# Patient Record
Sex: FEMALE | Race: ASIAN | NOT HISPANIC OR LATINO | URBAN - METROPOLITAN AREA
[De-identification: names, ages, dates, MRNs, and addresses within clinical notes are randomized per-mention and may not be internally consistent; named-entity substitution may affect disease eponyms.]

---

## 2018-06-25 ENCOUNTER — EMERGENCY (EMERGENCY)
Facility: HOSPITAL | Age: 43
LOS: 1 days | Discharge: HOME | End: 2018-06-25
Admitting: EMERGENCY MEDICINE
Payer: MEDICAID

## 2018-06-25 VITALS
DIASTOLIC BLOOD PRESSURE: 73 MMHG | SYSTOLIC BLOOD PRESSURE: 121 MMHG | TEMPERATURE: 98 F | OXYGEN SATURATION: 100 % | HEART RATE: 68 BPM | RESPIRATION RATE: 16 BRPM

## 2018-06-25 PROCEDURE — 99282 EMERGENCY DEPT VISIT SF MDM: CPT

## 2018-06-25 NOTE — ED PROVIDER NOTE - MEDICAL DECISION MAKING DETAILS
44 y/o F here for dental pain. No evidence of abscess. Pt needs teeth extracted as per dental, but does not have appt. Given number for dental clinic and pt was able to arrange appt w/ dentist on 07/02/18.

## 2018-06-25 NOTE — ED PROVIDER NOTE - OBJECTIVE STATEMENT
44 y/o F w/ no significant PMhx, presents to the ED c/o toothache xseveral weeks. Pt was told she needs teeth extracted by dentist, but was unable to get appt so came to ER. Denies fever, chills, difficulty swallowing, numbness/tingling or any other complaints.

## 2019-08-18 ENCOUNTER — EMERGENCY (EMERGENCY)
Facility: HOSPITAL | Age: 44
LOS: 1 days | Discharge: HOME | End: 2019-08-18
Admitting: EMERGENCY MEDICINE
Payer: MEDICAID

## 2019-08-18 VITALS
TEMPERATURE: 98 F | DIASTOLIC BLOOD PRESSURE: 75 MMHG | OXYGEN SATURATION: 99 % | RESPIRATION RATE: 16 BRPM | HEART RATE: 93 BPM | SYSTOLIC BLOOD PRESSURE: 114 MMHG

## 2019-08-18 PROCEDURE — 99283 EMERGENCY DEPT VISIT LOW MDM: CPT

## 2019-08-18 PROCEDURE — 73610 X-RAY EXAM OF ANKLE: CPT | Mod: 26,LT

## 2019-08-18 PROCEDURE — 73630 X-RAY EXAM OF FOOT: CPT | Mod: 26,LT

## 2019-08-18 RX ORDER — ACETAMINOPHEN 500 MG
650 TABLET ORAL ONCE
Refills: 0 | Status: COMPLETED | OUTPATIENT
Start: 2019-08-18 | End: 2019-08-18

## 2019-08-18 RX ADMIN — Medication 650 MILLIGRAM(S): at 21:03

## 2019-08-18 NOTE — ED PROVIDER NOTE - PHYSICAL EXAMINATION
Gen: Well appearing in NAD  Head: NC/AT  Neck: trachea midline  Resp:  No distress  Ext: no deformities  Neuro:  A&O appears non focal  Skin:  Warm and dry as visualized  Psych:  Normal affect and mood    left foot/ankle: no swelling or obv def. +ttp dorsum of foot/soft stissue. no tenderness to lateral malelous. full rom. sensations intact. no bruising noted.

## 2019-08-18 NOTE — ED PROVIDER NOTE - OBJECTIVE STATEMENT
45 Y/O female no PMH presents to ED c/o left ankle/foot inury s/p fall. Pt. states earlier today at 1pm she tripped and fell twisting her left ankle/foot - c/o increased pain to area worse with weight bearing. Denies taking anything for symptoms. Denies head trauma loc or any other injuries. Denies nausea vomit weakness dizziness.

## 2020-03-04 ENCOUNTER — EMERGENCY (EMERGENCY)
Facility: HOSPITAL | Age: 45
LOS: 1 days | Discharge: HOME | End: 2020-03-04
Attending: EMERGENCY MEDICINE | Admitting: EMERGENCY MEDICINE
Payer: MEDICAID

## 2020-03-04 VITALS
OXYGEN SATURATION: 100 % | SYSTOLIC BLOOD PRESSURE: 118 MMHG | TEMPERATURE: 98 F | HEART RATE: 61 BPM | DIASTOLIC BLOOD PRESSURE: 69 MMHG | RESPIRATION RATE: 16 BRPM

## 2020-03-04 VITALS
RESPIRATION RATE: 16 BRPM | OXYGEN SATURATION: 99 % | TEMPERATURE: 98 F | SYSTOLIC BLOOD PRESSURE: 120 MMHG | DIASTOLIC BLOOD PRESSURE: 73 MMHG | HEART RATE: 84 BPM

## 2020-03-04 LAB
ALBUMIN SERPL ELPH-MCNC: 4 G/DL — SIGNIFICANT CHANGE UP (ref 3.3–5)
ALP SERPL-CCNC: 49 U/L — SIGNIFICANT CHANGE UP (ref 40–120)
ALT FLD-CCNC: 16 U/L — SIGNIFICANT CHANGE UP (ref 4–33)
ANION GAP SERPL CALC-SCNC: 9 MMO/L — SIGNIFICANT CHANGE UP (ref 7–14)
AST SERPL-CCNC: 16 U/L — SIGNIFICANT CHANGE UP (ref 4–32)
BASOPHILS # BLD AUTO: 0.02 K/UL — SIGNIFICANT CHANGE UP (ref 0–0.2)
BASOPHILS NFR BLD AUTO: 0.2 % — SIGNIFICANT CHANGE UP (ref 0–2)
BILIRUB SERPL-MCNC: 0.3 MG/DL — SIGNIFICANT CHANGE UP (ref 0.2–1.2)
BUN SERPL-MCNC: 12 MG/DL — SIGNIFICANT CHANGE UP (ref 7–23)
CALCIUM SERPL-MCNC: 8.8 MG/DL — SIGNIFICANT CHANGE UP (ref 8.4–10.5)
CHLORIDE SERPL-SCNC: 100 MMOL/L — SIGNIFICANT CHANGE UP (ref 98–107)
CO2 SERPL-SCNC: 26 MMOL/L — SIGNIFICANT CHANGE UP (ref 22–31)
CREAT SERPL-MCNC: 0.85 MG/DL — SIGNIFICANT CHANGE UP (ref 0.5–1.3)
EOSINOPHIL # BLD AUTO: 0.08 K/UL — SIGNIFICANT CHANGE UP (ref 0–0.5)
EOSINOPHIL NFR BLD AUTO: 1 % — SIGNIFICANT CHANGE UP (ref 0–6)
GLUCOSE SERPL-MCNC: 98 MG/DL — SIGNIFICANT CHANGE UP (ref 70–99)
HCT VFR BLD CALC: 34.4 % — LOW (ref 34.5–45)
HGB BLD-MCNC: 10.6 G/DL — LOW (ref 11.5–15.5)
IMM GRANULOCYTES NFR BLD AUTO: 0.4 % — SIGNIFICANT CHANGE UP (ref 0–1.5)
LIDOCAIN IGE QN: 33 U/L — SIGNIFICANT CHANGE UP (ref 7–60)
LYMPHOCYTES # BLD AUTO: 2.81 K/UL — SIGNIFICANT CHANGE UP (ref 1–3.3)
LYMPHOCYTES # BLD AUTO: 34.2 % — SIGNIFICANT CHANGE UP (ref 13–44)
MCHC RBC-ENTMCNC: 22.4 PG — LOW (ref 27–34)
MCHC RBC-ENTMCNC: 30.8 % — LOW (ref 32–36)
MCV RBC AUTO: 72.7 FL — LOW (ref 80–100)
MONOCYTES # BLD AUTO: 0.65 K/UL — SIGNIFICANT CHANGE UP (ref 0–0.9)
MONOCYTES NFR BLD AUTO: 7.9 % — SIGNIFICANT CHANGE UP (ref 2–14)
NEUTROPHILS # BLD AUTO: 4.63 K/UL — SIGNIFICANT CHANGE UP (ref 1.8–7.4)
NEUTROPHILS NFR BLD AUTO: 56.3 % — SIGNIFICANT CHANGE UP (ref 43–77)
NRBC # FLD: 0 K/UL — SIGNIFICANT CHANGE UP (ref 0–0)
PLATELET # BLD AUTO: 300 K/UL — SIGNIFICANT CHANGE UP (ref 150–400)
PMV BLD: 10.5 FL — SIGNIFICANT CHANGE UP (ref 7–13)
POTASSIUM SERPL-MCNC: 3.5 MMOL/L — SIGNIFICANT CHANGE UP (ref 3.5–5.3)
POTASSIUM SERPL-SCNC: 3.5 MMOL/L — SIGNIFICANT CHANGE UP (ref 3.5–5.3)
PROT SERPL-MCNC: 7.8 G/DL — SIGNIFICANT CHANGE UP (ref 6–8.3)
RBC # BLD: 4.73 M/UL — SIGNIFICANT CHANGE UP (ref 3.8–5.2)
RBC # FLD: 14.6 % — HIGH (ref 10.3–14.5)
SODIUM SERPL-SCNC: 135 MMOL/L — SIGNIFICANT CHANGE UP (ref 135–145)
WBC # BLD: 8.22 K/UL — SIGNIFICANT CHANGE UP (ref 3.8–10.5)
WBC # FLD AUTO: 8.22 K/UL — SIGNIFICANT CHANGE UP (ref 3.8–10.5)

## 2020-03-04 PROCEDURE — 76705 ECHO EXAM OF ABDOMEN: CPT | Mod: 26

## 2020-03-04 PROCEDURE — 99284 EMERGENCY DEPT VISIT MOD MDM: CPT

## 2020-03-04 PROCEDURE — 74177 CT ABD & PELVIS W/CONTRAST: CPT | Mod: 26

## 2020-03-04 RX ORDER — ACETAMINOPHEN 500 MG
975 TABLET ORAL ONCE
Refills: 0 | Status: COMPLETED | OUTPATIENT
Start: 2020-03-04 | End: 2020-03-04

## 2020-03-04 RX ORDER — FAMOTIDINE 10 MG/ML
20 INJECTION INTRAVENOUS ONCE
Refills: 0 | Status: COMPLETED | OUTPATIENT
Start: 2020-03-04 | End: 2020-03-04

## 2020-03-04 RX ORDER — SODIUM CHLORIDE 9 MG/ML
1000 INJECTION INTRAMUSCULAR; INTRAVENOUS; SUBCUTANEOUS ONCE
Refills: 0 | Status: COMPLETED | OUTPATIENT
Start: 2020-03-04 | End: 2020-03-04

## 2020-03-04 RX ORDER — LIDOCAINE 4 G/100G
10 CREAM TOPICAL ONCE
Refills: 0 | Status: COMPLETED | OUTPATIENT
Start: 2020-03-04 | End: 2020-03-04

## 2020-03-04 RX ADMIN — FAMOTIDINE 20 MILLIGRAM(S): 10 INJECTION INTRAVENOUS at 02:16

## 2020-03-04 RX ADMIN — Medication 30 MILLILITER(S): at 02:16

## 2020-03-04 RX ADMIN — Medication 975 MILLIGRAM(S): at 02:16

## 2020-03-04 RX ADMIN — SODIUM CHLORIDE 1000 MILLILITER(S): 9 INJECTION INTRAMUSCULAR; INTRAVENOUS; SUBCUTANEOUS at 02:16

## 2020-03-04 RX ADMIN — LIDOCAINE 10 MILLILITER(S): 4 CREAM TOPICAL at 02:16

## 2020-03-04 NOTE — ED PROVIDER NOTE - NSFOLLOWUPINSTRUCTIONS_ED_ALL_ED_FT
Please follow up with your primary care doctor after you leave the emergency department so that they can follow up and conduct more testing and treatment as they deem necessary. If you have worsening signs or symptoms of what you came in to the Emergency Department today and are not able to see your doctor, go to your nearest emergency department or return to the [facility] emergency department for further care and management.

## 2020-03-04 NOTE — ED ADULT NURSE NOTE - CHPI ED NUR SYMPTOMS NEG
no abdominal distension/no burning urination/no blood in stool/no diarrhea/no vomiting/no dysuria/no hematuria

## 2020-03-04 NOTE — ED ADULT TRIAGE NOTE - CHIEF COMPLAINT QUOTE
Patient developed abdominal pain 3 days ago with nausea.  No fever, chills, vomiting, diarrhea or changes in urinary sx.  No significant medical history.

## 2020-03-04 NOTE — ED PROVIDER NOTE - CLINICAL SUMMARY MEDICAL DECISION MAKING FREE TEXT BOX
43 yo F with no reported Past Medical History that presents with 3 days abd pain, epigastric and found to have RLQ pain on exam as well with nausea reported by no vomiting or diarrhea. Currently on menstrual period but does not think this pain is similar to her usual menses. no fever, chills reported and no urinary symptoms. Will obtain labs, provide meds and reassess. Possible cholecysitis vs gallstones vs appendicitis. Possible gas. pt has been tolerating PO otherwise.

## 2020-03-04 NOTE — ED ADULT NURSE NOTE - OBJECTIVE STATEMENT
RN facilitator: pt A&O4 ambulatory c/o diffisue abdominal pain and nausea that began while at work. pt states she was not doing anything strenuous at time of onset. pt states she has been having "problems going to the restroom" denies urinary symptoms. took pepto bismol prior to arrival without relief. MD Paniagua at bedside for evaluation. pt given UA cup and shown to restroom. handoff report given to primary RN Janet.

## 2020-03-04 NOTE — ED PROVIDER NOTE - PROGRESS NOTE DETAILS
Pain improved, labs unremarkable, CT and US both read by rads as no acute abnormalitles that could explain pt pain. No maxwell, no appendicitis. No inflammation otherwise. Will discharge to f/u with PMD with all lab and imaging results from this ED visit. Pt and son amenable to plan. Rec PO tylenol PRN.

## 2020-03-04 NOTE — ED PROVIDER NOTE - OBJECTIVE STATEMENT
45 yo F with no Past Medical History that presents with abd pain. Pain is 10/10, severe, colicky, epigastrium radiating to mid back, not worse after eating but has had nausea without vomiting, diarrhea. Denies chest pain, SOB, diaphoresis. Has been eating and drinking normally otherwise. Symptoms have been ongoing x 3 days but worse tonight prompting her to visit ED. no travel, no trauma. Denies fever, chills, rashes. No abnormal food intake. No dysuria/hematuria, increased freq of urination. Non smoker, no drinking, no drugs. LMP currently without any abnormal vaginal discharge. No pelvic pain per pt.

## 2020-03-04 NOTE — ED PROVIDER NOTE - PATIENT PORTAL LINK FT
You can access the FollowMyHealth Patient Portal offered by [facility] by registering at the following website: [email or web address]. By joining [employer]’s FollowMyHealth portal, you will also be able to view your health information using other applications (apps) compatible with our system.

## 2021-06-09 ENCOUNTER — APPOINTMENT (OUTPATIENT)
Dept: OBGYN | Facility: CLINIC | Age: 46
End: 2021-06-09
Payer: MEDICAID

## 2021-06-09 ENCOUNTER — RESULT REVIEW (OUTPATIENT)
Age: 46
End: 2021-06-09

## 2021-06-09 ENCOUNTER — OUTPATIENT (OUTPATIENT)
Dept: OUTPATIENT SERVICES | Facility: HOSPITAL | Age: 46
LOS: 1 days | End: 2021-06-09
Payer: MEDICAID

## 2021-06-09 VITALS
WEIGHT: 133 LBS | SYSTOLIC BLOOD PRESSURE: 114 MMHG | DIASTOLIC BLOOD PRESSURE: 80 MMHG | BODY MASS INDEX: 22.16 KG/M2 | HEIGHT: 65 IN

## 2021-06-09 DIAGNOSIS — Z00.00 ENCOUNTER FOR GENERAL ADULT MEDICAL EXAMINATION WITHOUT ABNORMAL FINDINGS: ICD-10-CM

## 2021-06-09 PROCEDURE — 99386 PREV VISIT NEW AGE 40-64: CPT

## 2021-06-09 PROCEDURE — G0463: CPT

## 2021-06-10 DIAGNOSIS — N76.0 ACUTE VAGINITIS: ICD-10-CM

## 2021-06-11 NOTE — PHYSICAL EXAM
[Appropriately responsive] : appropriately responsive [Alert] : alert [No Acute Distress] : no acute distress [No Lymphadenopathy] : no lymphadenopathy [Soft] : soft [Non-tender] : non-tender [Oriented x3] : oriented x3 [Labia Majora] : normal [Labia Minora] : normal [Normal] : normal [Normal Position] : in a normal position [FreeTextEntry6] : Nontender, no mass, no discharge, no LAD [FreeTextEntry1] : No mass or lesion

## 2021-06-11 NOTE — DISCUSSION/SUMMARY
[FreeTextEntry1] : 47y/o  LMP 6/4 presenting to establish GYN care. Without acute complaints, no abnormalities noted on physical exam. \par  - Mammogram rx provided \par  - Pap obtained \par  - Patient advised to follow-up in 1y or sooner PRN \par \par d/w Dr. Bower \par Jose PGY2

## 2021-06-11 NOTE — HISTORY OF PRESENT ILLNESS
[FreeTextEntry1] : 45y/o  (s/p  x3) presenting for initial visit to establish GYN care, without acute complaints. Patient reports that she has been in good health. Her LMP was 6/4, she experiences regular monthly menstrual cycles. She is sexually active with her  and uses condoms for contraception. She denies pain or difficulty with intercourse. She reports her last Pap was prior to COVID, and denies any h/o irregular Pap smears. She denies any known h/o fibroids or ovarian cysts. She does mention a recent small 'bump' at her vaginal orifice, which she notes has been nontender/non pruritic and has involuted in size. Her last mammogram was approximately 2y ago. \par \par She has a PCP with whom she follows regularly, and a GI doctor. She denies any significant medical history. She has had a colonoscopy (reportedly normal) within the last 5 years. \par \par PMHx: Denies\par PSHx:  x3\par FHx: Denies family h/o GYN or breast cancer \par Meds: Denies\par \par Last Pap: pre-COVID, denies h/o abormal Pap \par Last Mammogram: 2y ago, reportedly wnl \par \par \par

## 2022-01-18 ENCOUNTER — EMERGENCY (EMERGENCY)
Facility: HOSPITAL | Age: 47
LOS: 1 days | Discharge: HOME | End: 2022-01-18
Attending: EMERGENCY MEDICINE | Admitting: EMERGENCY MEDICINE
Payer: MEDICAID

## 2022-01-18 VITALS
RESPIRATION RATE: 16 BRPM | HEART RATE: 62 BPM | SYSTOLIC BLOOD PRESSURE: 119 MMHG | OXYGEN SATURATION: 100 % | DIASTOLIC BLOOD PRESSURE: 83 MMHG | TEMPERATURE: 99 F

## 2022-01-18 PROCEDURE — 99284 EMERGENCY DEPT VISIT MOD MDM: CPT

## 2022-01-18 PROCEDURE — 73610 X-RAY EXAM OF ANKLE: CPT | Mod: 26,LT

## 2022-01-18 PROCEDURE — 73100 X-RAY EXAM OF WRIST: CPT | Mod: 26,RT

## 2022-01-18 PROCEDURE — 73140 X-RAY EXAM OF FINGER(S): CPT | Mod: 26,LT

## 2022-01-18 RX ORDER — IBUPROFEN 200 MG
1 TABLET ORAL
Qty: 20 | Refills: 0
Start: 2022-01-18

## 2022-01-18 RX ORDER — ACETAMINOPHEN 500 MG
650 TABLET ORAL ONCE
Refills: 0 | Status: COMPLETED | OUTPATIENT
Start: 2022-01-18 | End: 2022-01-18

## 2022-01-18 RX ORDER — ACETAMINOPHEN 500 MG
1 TABLET ORAL
Qty: 30 | Refills: 0
Start: 2022-01-18

## 2022-01-18 RX ADMIN — Medication 650 MILLIGRAM(S): at 09:01

## 2022-01-18 NOTE — ED PROVIDER NOTE - CLINICAL SUMMARY MEDICAL DECISION MAKING FREE TEXT BOX
45 y/o female with no significant PMHx who presented to the ED for left ankle pain s/p trauma today.   Concern for ankle sprain/hand and finger sprain/Abrasion  X-ray, Analgesia

## 2022-01-18 NOTE — ED PROVIDER NOTE - NSFOLLOWUPINSTRUCTIONS_ED_ALL_ED_FT
YOU WERE SEEN FOR LEFT ANKLE PAIN    YOU HAD IMAGING DONE    YOU HAVE BEEN DIAGNOSED WITH LEFT ANKLE SPRAIN    TAKE TYLENOL 650mg EVERY 4 HOURS AS NEEDED FOR MILD PAIN  TAKE MOTRIN 400mg EVERY 6 HOURS AS NEEDED FOR MODERATE PAIN    REST, ICE (20 MIN ON, 20 MIN OFF), AND ELEVATE  USE ACE WRAP AS DISCUSSED    FOLLOW UP WITH YOUR PRIMARY CARE PROVIDER WITHIN 1 WEEK.    RETURN TO THE EMERGENCY DEPARTMENT FOR WORSENING PAIN, DIFFICULTY WALKING, OR FOR ANY WORSENING SYMPTOMS.

## 2022-01-18 NOTE — ED PROVIDER NOTE - MUSCULOSKELETAL, MLM
Spine appears normal, range of motion is not limited, mild tenderness to lateral aspect of left ankle just inferior to lateral malleolus. No tenderness to 5th metatarsal or to heel. FROM of b/l UE and LE. + distal pulses. Noted to have some ecchymosis to palm of right hand with mild tenderness. Noted ecchymosis to left 4th finger.

## 2022-01-18 NOTE — ED ADULT NURSE NOTE - OBJECTIVE STATEMENT
received pt in intake room 10C, 46yr/o female A+Ox4 ambulatory at baseline. presented to the ED C/O left ankle pain 8/10 aching S/P fall. pt states she was on the escalator when she tripped and twisted her left ankle. no edema noted at left ankle no joint deformities noted. Pt states she landed on her hands. Denies hitting her head or LOC, no neuro deficits noted. skin is clean dry and intact. medications given as ordered. will continue to monitor.

## 2022-01-18 NOTE — ED PROVIDER NOTE - CARE PLAN
Principal Discharge DX:	Left ankle sprain  Secondary Diagnosis:	Abrasion  Secondary Diagnosis:	Sprain of right hand   1

## 2022-01-18 NOTE — ED PROVIDER NOTE - PATIENT PORTAL LINK FT
You can access the FollowMyHealth Patient Portal offered by [facility] by registering at the following website: [email or web address]. By joining [employer]’s FollowMyHealth portal, you will also be able to view your health information using other applications (apps) compatible with our system.

## 2022-01-18 NOTE — ED ADULT NURSE NOTE - NSIMPLEMENTINTERV_GEN_ALL_ED
Implemented All Fall Risk Interventions:  [place] to call system. Call bell, personal items and telephone within reach. Instruct patient to call for assistance. Room bathroom lighting operational. Non-slip footwear when patient is off stretcher. Physically safe environment: no spills, clutter or unnecessary equipment. Stretcher in lowest position, wheels locked, appropriate side rails in place. Provide visual cue, wrist band, yellow gown, etc. Monitor gait and stability. Monitor for mental status changes and reorient to person, place, and time. Review medications for side effects contributing to fall risk. Reinforce activity limits and safety measures with patient and family.

## 2022-01-18 NOTE — ED PROVIDER NOTE - PROGRESS NOTE DETAILS
Dr. Alberto. ACE wrap was applied to left ankle. Abrasion to left ankle cleaned and Bacitracin applied. Dressed with sterile gauze. Pt ambulating without any difficulty. Advised rest, elevation, and ice (20 min on 20 min off).

## 2022-01-18 NOTE — ED PROVIDER NOTE - OBJECTIVE STATEMENT
45 y/o female with no significant PMHx who presented to the ED for left ankle pain s/p trauma today. Pt states she was on the escalator when she tripped and twisted her left ankle. Pt states she landed on her hands. Denies any head injury or LOC. Pt notes since having left ankle pain. Pt denies any fever, chills, nausea, vomiting, SOB, chest pain, or abd pain. Pt states Tdap UTD.

## 2022-01-18 NOTE — ED ADULT TRIAGE NOTE - CHIEF COMPLAINT QUOTE
states" I trip and fell off the escalator about 3 steps and hurt my left ankle and foot." denies LOC. denies use of AC. ambulatory at the scene. abrasion to left ankle noted. no deformity or swelling to left ankle noted.

## 2022-12-10 ENCOUNTER — INPATIENT (INPATIENT)
Facility: HOSPITAL | Age: 47
LOS: 2 days | Discharge: HOME HEALTH | DRG: 690 | End: 2022-12-13
Attending: HOSPITALIST | Admitting: HOSPITALIST
Payer: MEDICAID

## 2022-12-10 VITALS
HEART RATE: 99 BPM | OXYGEN SATURATION: 99 % | HEIGHT: 61 IN | TEMPERATURE: 98 F | DIASTOLIC BLOOD PRESSURE: 83 MMHG | RESPIRATION RATE: 18 BRPM | SYSTOLIC BLOOD PRESSURE: 122 MMHG | WEIGHT: 134.92 LBS

## 2022-12-10 LAB
ALBUMIN SERPL ELPH-MCNC: 4.2 G/DL — SIGNIFICANT CHANGE UP (ref 3.3–5)
ALP SERPL-CCNC: 57 U/L — SIGNIFICANT CHANGE UP (ref 40–120)
ALT FLD-CCNC: 27 U/L — SIGNIFICANT CHANGE UP (ref 10–45)
ANION GAP SERPL CALC-SCNC: 14 MMOL/L — SIGNIFICANT CHANGE UP (ref 5–17)
AST SERPL-CCNC: 23 U/L — SIGNIFICANT CHANGE UP (ref 10–40)
BASE EXCESS BLDV CALC-SCNC: -5.3 MMOL/L — LOW (ref -2–3)
BASOPHILS # BLD AUTO: 0 K/UL — SIGNIFICANT CHANGE UP (ref 0–0.2)
BASOPHILS NFR BLD AUTO: 0 % — SIGNIFICANT CHANGE UP (ref 0–2)
BILIRUB SERPL-MCNC: 0.4 MG/DL — SIGNIFICANT CHANGE UP (ref 0.2–1.2)
BUN SERPL-MCNC: 14 MG/DL — SIGNIFICANT CHANGE UP (ref 7–23)
CA-I SERPL-SCNC: 1.24 MMOL/L — SIGNIFICANT CHANGE UP (ref 1.15–1.33)
CALCIUM SERPL-MCNC: 9.3 MG/DL — SIGNIFICANT CHANGE UP (ref 8.4–10.5)
CHLORIDE BLDV-SCNC: 98 MMOL/L — SIGNIFICANT CHANGE UP (ref 96–108)
CHLORIDE SERPL-SCNC: 99 MMOL/L — SIGNIFICANT CHANGE UP (ref 96–108)
CO2 BLDV-SCNC: 23 MMOL/L — SIGNIFICANT CHANGE UP (ref 22–26)
CO2 SERPL-SCNC: 18 MMOL/L — LOW (ref 22–31)
CREAT SERPL-MCNC: 0.76 MG/DL — SIGNIFICANT CHANGE UP (ref 0.5–1.3)
EGFR: 98 ML/MIN/1.73M2 — SIGNIFICANT CHANGE UP
EOSINOPHIL # BLD AUTO: 0 K/UL — SIGNIFICANT CHANGE UP (ref 0–0.5)
EOSINOPHIL NFR BLD AUTO: 0 % — SIGNIFICANT CHANGE UP (ref 0–6)
FLUAV AG NPH QL: SIGNIFICANT CHANGE UP
FLUBV AG NPH QL: SIGNIFICANT CHANGE UP
GAS PNL BLDV: 130 MMOL/L — LOW (ref 136–145)
GAS PNL BLDV: SIGNIFICANT CHANGE UP
GAS PNL BLDV: SIGNIFICANT CHANGE UP
GLUCOSE BLDV-MCNC: 112 MG/DL — HIGH (ref 70–99)
GLUCOSE SERPL-MCNC: 105 MG/DL — HIGH (ref 70–99)
HCG SERPL-ACNC: <2 MIU/ML — SIGNIFICANT CHANGE UP
HCO3 BLDV-SCNC: 21 MMOL/L — LOW (ref 22–29)
HCT VFR BLD CALC: 37.6 % — SIGNIFICANT CHANGE UP (ref 34.5–45)
HCT VFR BLDA CALC: 37 % — SIGNIFICANT CHANGE UP (ref 34.5–46.5)
HGB BLD CALC-MCNC: 12.3 G/DL — SIGNIFICANT CHANGE UP (ref 11.7–16.1)
HGB BLD-MCNC: 12 G/DL — SIGNIFICANT CHANGE UP (ref 11.5–15.5)
LACTATE BLDV-MCNC: 2.5 MMOL/L — HIGH (ref 0.5–2)
LIDOCAIN IGE QN: 18 U/L — SIGNIFICANT CHANGE UP (ref 7–60)
LYMPHOCYTES # BLD AUTO: 0.78 K/UL — LOW (ref 1–3.3)
LYMPHOCYTES # BLD AUTO: 4.3 % — LOW (ref 13–44)
MANUAL SMEAR VERIFICATION: SIGNIFICANT CHANGE UP
MCHC RBC-ENTMCNC: 22.8 PG — LOW (ref 27–34)
MCHC RBC-ENTMCNC: 31.9 GM/DL — LOW (ref 32–36)
MCV RBC AUTO: 71.5 FL — LOW (ref 80–100)
MONOCYTES # BLD AUTO: 0.64 K/UL — SIGNIFICANT CHANGE UP (ref 0–0.9)
MONOCYTES NFR BLD AUTO: 3.5 % — SIGNIFICANT CHANGE UP (ref 2–14)
NEUTROPHILS # BLD AUTO: 16.8 K/UL — HIGH (ref 1.8–7.4)
NEUTROPHILS NFR BLD AUTO: 92.2 % — HIGH (ref 43–77)
PCO2 BLDV: 44 MMHG — HIGH (ref 39–42)
PH BLDV: 7.29 — LOW (ref 7.32–7.43)
PLAT MORPH BLD: NORMAL — SIGNIFICANT CHANGE UP
PLATELET # BLD AUTO: 329 K/UL — SIGNIFICANT CHANGE UP (ref 150–400)
PO2 BLDV: 33 MMHG — SIGNIFICANT CHANGE UP (ref 25–45)
POLYCHROMASIA BLD QL SMEAR: SLIGHT — SIGNIFICANT CHANGE UP
POTASSIUM BLDV-SCNC: 4 MMOL/L — SIGNIFICANT CHANGE UP (ref 3.5–5.1)
POTASSIUM SERPL-MCNC: 4.2 MMOL/L — SIGNIFICANT CHANGE UP (ref 3.5–5.3)
POTASSIUM SERPL-SCNC: 4.2 MMOL/L — SIGNIFICANT CHANGE UP (ref 3.5–5.3)
PROT SERPL-MCNC: 8.1 G/DL — SIGNIFICANT CHANGE UP (ref 6–8.3)
RBC # BLD: 5.26 M/UL — HIGH (ref 3.8–5.2)
RBC # FLD: 14.3 % — SIGNIFICANT CHANGE UP (ref 10.3–14.5)
RBC BLD AUTO: SIGNIFICANT CHANGE UP
RSV RNA NPH QL NAA+NON-PROBE: SIGNIFICANT CHANGE UP
SAO2 % BLDV: 48.3 % — LOW (ref 67–88)
SARS-COV-2 RNA SPEC QL NAA+PROBE: SIGNIFICANT CHANGE UP
SODIUM SERPL-SCNC: 131 MMOL/L — LOW (ref 135–145)
WBC # BLD: 18.22 K/UL — HIGH (ref 3.8–10.5)
WBC # FLD AUTO: 18.22 K/UL — HIGH (ref 3.8–10.5)

## 2022-12-10 PROCEDURE — 74019 RADEX ABDOMEN 2 VIEWS: CPT | Mod: 26

## 2022-12-10 PROCEDURE — 99285 EMERGENCY DEPT VISIT HI MDM: CPT

## 2022-12-10 PROCEDURE — 74177 CT ABD & PELVIS W/CONTRAST: CPT | Mod: 26,MA

## 2022-12-10 RX ORDER — LACTULOSE 10 G/15ML
30 SOLUTION ORAL ONCE
Refills: 0 | Status: COMPLETED | OUTPATIENT
Start: 2022-12-10 | End: 2022-12-10

## 2022-12-10 RX ORDER — SODIUM CHLORIDE 9 MG/ML
1000 INJECTION INTRAMUSCULAR; INTRAVENOUS; SUBCUTANEOUS ONCE
Refills: 0 | Status: COMPLETED | OUTPATIENT
Start: 2022-12-10 | End: 2022-12-10

## 2022-12-10 RX ORDER — PIPERACILLIN AND TAZOBACTAM 4; .5 G/20ML; G/20ML
3.38 INJECTION, POWDER, LYOPHILIZED, FOR SOLUTION INTRAVENOUS ONCE
Refills: 0 | Status: COMPLETED | OUTPATIENT
Start: 2022-12-10 | End: 2022-12-10

## 2022-12-10 RX ORDER — ONDANSETRON 8 MG/1
4 TABLET, FILM COATED ORAL ONCE
Refills: 0 | Status: COMPLETED | OUTPATIENT
Start: 2022-12-10 | End: 2022-12-10

## 2022-12-10 RX ORDER — ACETAMINOPHEN 500 MG
1000 TABLET ORAL ONCE
Refills: 0 | Status: COMPLETED | OUTPATIENT
Start: 2022-12-10 | End: 2022-12-10

## 2022-12-10 RX ADMIN — ONDANSETRON 4 MILLIGRAM(S): 8 TABLET, FILM COATED ORAL at 19:21

## 2022-12-10 RX ADMIN — SODIUM CHLORIDE 1000 MILLILITER(S): 9 INJECTION INTRAMUSCULAR; INTRAVENOUS; SUBCUTANEOUS at 19:22

## 2022-12-10 RX ADMIN — Medication 400 MILLIGRAM(S): at 19:21

## 2022-12-10 RX ADMIN — LACTULOSE 30 GRAM(S): 10 SOLUTION ORAL at 22:51

## 2022-12-10 NOTE — ED PROVIDER NOTE - OBJECTIVE STATEMENT
47F PMH GERD and constipation p/w abdominal pain. Pt had knee surgery on 12/4. She has not had a bowel movement since the surgery. Has been taking oxycodone and a stool softener. Started having abdominal pain yesterday and it's worse today. The pain is located throughout her abdomen. Endorses nausea and vomiting when she tries to defecate. Has been eating bananas and rice. Denies fever, chest pain, SOB, dysuria, urinary frequency.

## 2022-12-10 NOTE — ED PROVIDER NOTE - PROGRESS NOTE DETAILS
Reynaldo PGY2: pt taken as sign out at 11:45pm - here for abd pain, found to have constipation and elevated WBC. Pt UA+ve, air in bladder on CT scan. Eventually able to defecate x1 after fleet enema but pain remains. More upper discomfort than lower.     Of note pt had knee surgery 12/4 and thinks she might have had a vargas during anesthesia but isn't sure.   WIll plan to admit for pain control and further abbx. Chad Betancur MD: Patient signed out pending urinalysis.  Patient was already started on IV Zosyn.  I have reassessed the patient and she continues to have abdominal pain and tenderness on examination.  Given her findings on CT and urinalysis, concerning for UTI with possible emphysematous cystitis given no catheterization today.  Urology was consulted who recommended for continuing IV antibiotics, placing a Wilkins, but no surgical intervention at this time.  Patient was accepted to medicine service.  The patient will need to be admitted to the hospital for continued evaluation and management, as well as to optimize medical management and to provide outpatient needs assessment.  Discussed with the accepting physician regarding the initial presentation, diagnostic studies, treatments given in the ED, and current plan of care.   The patient was accepted by and endorsed to the medicine team.

## 2022-12-10 NOTE — ED ADULT NURSE NOTE - NSIMPLEMENTINTERV_GEN_ALL_ED
Implemented All Universal Safety Interventions:  [place] to call system. Call bell, personal items and telephone within reach. Instruct patient to call for assistance. Room bathroom lighting operational. Non-slip footwear when patient is off stretcher. Physically safe environment: no spills, clutter or unnecessary equipment. Stretcher in lowest position, wheels locked, appropriate side rails in place.

## 2022-12-10 NOTE — ED PROVIDER NOTE - CARE PLAN
1 Principal Discharge DX:	Constipation   Principal Discharge DX:	Constipation  Secondary Diagnosis:	Acute UTI

## 2022-12-10 NOTE — ED PROVIDER NOTE - CLINICAL SUMMARY MEDICAL DECISION MAKING FREE TEXT BOX
47F PMH GERD and constipation p/w abdominal pain. Vitals: HR 99, others unremarkable. On exam, abdomen with +BS, soft, nondistended, diffuse ttp worse in the epigastric and LLQ, right LE wrapped in an ace bandage. DDx: constipation, SBO, volvulus. Plan: blood work, xray abdomen, TSH, UA/UC, pain control, zofran, fluids. Will re-assess. 47F PMH GERD and constipation p/w abdominal pain. Vitals: HR 99, others unremarkable. On exam, abdomen with +BS, soft, nondistended, diffuse ttp worse in the epigastric and LLQ, right LE wrapped in an ace bandage. DDx: constipation drug related ,  less likely SBO, volvulus. Plan: blood work, xray abdomen, TSH, UA/UC, pain control, zofran, fluids. Will re-assess.ZR

## 2022-12-10 NOTE — ED ADULT NURSE NOTE - OBJECTIVE STATEMENT
pt reports no bowel movement since 12/4 when pt had knee surgery and has been on pain meds and stool softeners  pt in er br for extended period of time attempting to have bm  pt eating rice, bananas and not much veggies or fruits.

## 2022-12-10 NOTE — ED PROVIDER NOTE - PHYSICAL EXAMINATION
PHYSICAL EXAM:  GENERAL: Appears uncomfortable   HENMT: Atraumatic, moist mucous membranes EYES: Clear bilaterally, PERRL, EOMs intact b/l  HEART: RRR, S1/S2, no murmur  RESPIRATORY: Clear to auscultation bilaterally, no wheezes/rhonchi/rales  ABDOMEN: +BS, soft, nondistended, diffuse ttp worse in the epigastric and LLQ   EXTREMITIES: Right LE wrapped in an ace bandage, +2 pulses b/l  NEURO: A&Ox4

## 2022-12-11 DIAGNOSIS — Z29.9 ENCOUNTER FOR PROPHYLACTIC MEASURES, UNSPECIFIED: ICD-10-CM

## 2022-12-11 DIAGNOSIS — K59.00 CONSTIPATION, UNSPECIFIED: ICD-10-CM

## 2022-12-11 DIAGNOSIS — N30.80 OTHER CYSTITIS WITHOUT HEMATURIA: ICD-10-CM

## 2022-12-11 DIAGNOSIS — K21.9 GASTRO-ESOPHAGEAL REFLUX DISEASE WITHOUT ESOPHAGITIS: ICD-10-CM

## 2022-12-11 DIAGNOSIS — Z98.890 OTHER SPECIFIED POSTPROCEDURAL STATES: Chronic | ICD-10-CM

## 2022-12-11 LAB
ANION GAP SERPL CALC-SCNC: 11 MMOL/L — SIGNIFICANT CHANGE UP (ref 5–17)
APPEARANCE UR: ABNORMAL
BACTERIA # UR AUTO: ABNORMAL
BILIRUB UR-MCNC: NEGATIVE — SIGNIFICANT CHANGE UP
BUN SERPL-MCNC: 10 MG/DL — SIGNIFICANT CHANGE UP (ref 7–23)
CALCIUM SERPL-MCNC: 8.1 MG/DL — LOW (ref 8.4–10.5)
CHLORIDE SERPL-SCNC: 110 MMOL/L — HIGH (ref 96–108)
CO2 SERPL-SCNC: 19 MMOL/L — LOW (ref 22–31)
COLOR SPEC: YELLOW — SIGNIFICANT CHANGE UP
CREAT SERPL-MCNC: 0.77 MG/DL — SIGNIFICANT CHANGE UP (ref 0.5–1.3)
DIFF PNL FLD: ABNORMAL
EGFR: 96 ML/MIN/1.73M2 — SIGNIFICANT CHANGE UP
EPI CELLS # UR: 1 /HPF — SIGNIFICANT CHANGE UP
GLUCOSE SERPL-MCNC: 117 MG/DL — HIGH (ref 70–99)
GLUCOSE UR QL: NEGATIVE — SIGNIFICANT CHANGE UP
HCT VFR BLD CALC: 32.1 % — LOW (ref 34.5–45)
HGB BLD-MCNC: 10.4 G/DL — LOW (ref 11.5–15.5)
HYALINE CASTS # UR AUTO: 1 /LPF — SIGNIFICANT CHANGE UP (ref 0–2)
KETONES UR-MCNC: NEGATIVE — SIGNIFICANT CHANGE UP
LACTATE BLDV-MCNC: 1.2 MMOL/L — SIGNIFICANT CHANGE UP (ref 0.5–2)
LEUKOCYTE ESTERASE UR-ACNC: NEGATIVE — SIGNIFICANT CHANGE UP
MAGNESIUM SERPL-MCNC: 2.1 MG/DL — SIGNIFICANT CHANGE UP (ref 1.6–2.6)
MCHC RBC-ENTMCNC: 23 PG — LOW (ref 27–34)
MCHC RBC-ENTMCNC: 32.4 GM/DL — SIGNIFICANT CHANGE UP (ref 32–36)
MCV RBC AUTO: 71 FL — LOW (ref 80–100)
NITRITE UR-MCNC: POSITIVE
NRBC # BLD: 0 /100 WBCS — SIGNIFICANT CHANGE UP (ref 0–0)
PH UR: 5 — SIGNIFICANT CHANGE UP (ref 5–8)
PHOSPHATE SERPL-MCNC: 2.9 MG/DL — SIGNIFICANT CHANGE UP (ref 2.5–4.5)
PLATELET # BLD AUTO: 275 K/UL — SIGNIFICANT CHANGE UP (ref 150–400)
POTASSIUM SERPL-MCNC: 3.5 MMOL/L — SIGNIFICANT CHANGE UP (ref 3.5–5.3)
POTASSIUM SERPL-SCNC: 3.5 MMOL/L — SIGNIFICANT CHANGE UP (ref 3.5–5.3)
PROT UR-MCNC: NEGATIVE — SIGNIFICANT CHANGE UP
RBC # BLD: 4.52 M/UL — SIGNIFICANT CHANGE UP (ref 3.8–5.2)
RBC # FLD: 14.6 % — HIGH (ref 10.3–14.5)
RBC CASTS # UR COMP ASSIST: 1 /HPF — SIGNIFICANT CHANGE UP (ref 0–4)
SODIUM SERPL-SCNC: 140 MMOL/L — SIGNIFICANT CHANGE UP (ref 135–145)
SP GR SPEC: 1.01 — SIGNIFICANT CHANGE UP (ref 1.01–1.02)
TSH SERPL-MCNC: 0.91 UIU/ML — SIGNIFICANT CHANGE UP (ref 0.27–4.2)
UROBILINOGEN FLD QL: NEGATIVE — SIGNIFICANT CHANGE UP
WBC # BLD: 13.24 K/UL — HIGH (ref 3.8–10.5)
WBC # FLD AUTO: 13.24 K/UL — HIGH (ref 3.8–10.5)
WBC UR QL: 10 /HPF — HIGH (ref 0–5)

## 2022-12-11 PROCEDURE — 12345: CPT | Mod: NC,GC

## 2022-12-11 PROCEDURE — 99223 1ST HOSP IP/OBS HIGH 75: CPT

## 2022-12-11 RX ORDER — PIPERACILLIN AND TAZOBACTAM 4; .5 G/20ML; G/20ML
3.38 INJECTION, POWDER, LYOPHILIZED, FOR SOLUTION INTRAVENOUS ONCE
Refills: 0 | Status: COMPLETED | OUTPATIENT
Start: 2022-12-11 | End: 2022-12-11

## 2022-12-11 RX ORDER — PIPERACILLIN AND TAZOBACTAM 4; .5 G/20ML; G/20ML
3.38 INJECTION, POWDER, LYOPHILIZED, FOR SOLUTION INTRAVENOUS ONCE
Refills: 0 | Status: COMPLETED | OUTPATIENT
Start: 2022-12-12 | End: 2022-12-12

## 2022-12-11 RX ORDER — ONDANSETRON 8 MG/1
4 TABLET, FILM COATED ORAL EVERY 8 HOURS
Refills: 0 | Status: DISCONTINUED | OUTPATIENT
Start: 2022-12-11 | End: 2022-12-13

## 2022-12-11 RX ORDER — PIPERACILLIN AND TAZOBACTAM 4; .5 G/20ML; G/20ML
3.38 INJECTION, POWDER, LYOPHILIZED, FOR SOLUTION INTRAVENOUS EVERY 8 HOURS
Refills: 0 | Status: DISCONTINUED | OUTPATIENT
Start: 2022-12-12 | End: 2022-12-13

## 2022-12-11 RX ORDER — ENOXAPARIN SODIUM 100 MG/ML
40 INJECTION SUBCUTANEOUS EVERY 24 HOURS
Refills: 0 | Status: DISCONTINUED | OUTPATIENT
Start: 2022-12-11 | End: 2022-12-13

## 2022-12-11 RX ORDER — METHYLNALTREXONE BROMIDE 12 MG/.6ML
8 INJECTION, SOLUTION SUBCUTANEOUS ONCE
Refills: 0 | Status: COMPLETED | OUTPATIENT
Start: 2022-12-11 | End: 2022-12-11

## 2022-12-11 RX ORDER — ACETAMINOPHEN 500 MG
650 TABLET ORAL EVERY 6 HOURS
Refills: 0 | Status: DISCONTINUED | OUTPATIENT
Start: 2022-12-11 | End: 2022-12-13

## 2022-12-11 RX ORDER — SODIUM CHLORIDE 9 MG/ML
1000 INJECTION INTRAMUSCULAR; INTRAVENOUS; SUBCUTANEOUS ONCE
Refills: 0 | Status: COMPLETED | OUTPATIENT
Start: 2022-12-11 | End: 2022-12-11

## 2022-12-11 RX ORDER — LANOLIN ALCOHOL/MO/W.PET/CERES
3 CREAM (GRAM) TOPICAL AT BEDTIME
Refills: 0 | Status: DISCONTINUED | OUTPATIENT
Start: 2022-12-11 | End: 2022-12-13

## 2022-12-11 RX ORDER — PIPERACILLIN AND TAZOBACTAM 4; .5 G/20ML; G/20ML
3.38 INJECTION, POWDER, LYOPHILIZED, FOR SOLUTION INTRAVENOUS EVERY 8 HOURS
Refills: 0 | Status: DISCONTINUED | OUTPATIENT
Start: 2022-12-11 | End: 2022-12-11

## 2022-12-11 RX ORDER — SENNA PLUS 8.6 MG/1
2 TABLET ORAL AT BEDTIME
Refills: 0 | Status: DISCONTINUED | OUTPATIENT
Start: 2022-12-11 | End: 2022-12-13

## 2022-12-11 RX ORDER — POLYETHYLENE GLYCOL 3350 17 G/17G
17 POWDER, FOR SOLUTION ORAL
Refills: 0 | Status: DISCONTINUED | OUTPATIENT
Start: 2022-12-11 | End: 2022-12-13

## 2022-12-11 RX ORDER — IBUPROFEN 200 MG
400 TABLET ORAL EVERY 6 HOURS
Refills: 0 | Status: DISCONTINUED | OUTPATIENT
Start: 2022-12-11 | End: 2022-12-11

## 2022-12-11 RX ORDER — FAMOTIDINE 10 MG/ML
20 INJECTION INTRAVENOUS DAILY
Refills: 0 | Status: DISCONTINUED | OUTPATIENT
Start: 2022-12-11 | End: 2022-12-13

## 2022-12-11 RX ORDER — ACETAMINOPHEN 500 MG
650 TABLET ORAL EVERY 6 HOURS
Refills: 0 | Status: DISCONTINUED | OUTPATIENT
Start: 2022-12-11 | End: 2022-12-11

## 2022-12-11 RX ORDER — IBUPROFEN 200 MG
600 TABLET ORAL EVERY 6 HOURS
Refills: 0 | Status: DISCONTINUED | OUTPATIENT
Start: 2022-12-11 | End: 2022-12-13

## 2022-12-11 RX ADMIN — Medication 10 MILLIGRAM(S): at 11:42

## 2022-12-11 RX ADMIN — SODIUM CHLORIDE 1000 MILLILITER(S): 9 INJECTION INTRAMUSCULAR; INTRAVENOUS; SUBCUTANEOUS at 04:06

## 2022-12-11 RX ADMIN — Medication 1 ENEMA: at 02:08

## 2022-12-11 RX ADMIN — Medication 650 MILLIGRAM(S): at 11:44

## 2022-12-11 RX ADMIN — FAMOTIDINE 20 MILLIGRAM(S): 10 INJECTION INTRAVENOUS at 11:44

## 2022-12-11 RX ADMIN — PIPERACILLIN AND TAZOBACTAM 200 GRAM(S): 4; .5 INJECTION, POWDER, LYOPHILIZED, FOR SOLUTION INTRAVENOUS at 00:15

## 2022-12-11 RX ADMIN — Medication 650 MILLIGRAM(S): at 17:38

## 2022-12-11 RX ADMIN — PIPERACILLIN AND TAZOBACTAM 200 GRAM(S): 4; .5 INJECTION, POWDER, LYOPHILIZED, FOR SOLUTION INTRAVENOUS at 08:35

## 2022-12-11 RX ADMIN — PIPERACILLIN AND TAZOBACTAM 25 GRAM(S): 4; .5 INJECTION, POWDER, LYOPHILIZED, FOR SOLUTION INTRAVENOUS at 11:42

## 2022-12-11 RX ADMIN — Medication 650 MILLIGRAM(S): at 18:08

## 2022-12-11 RX ADMIN — METHYLNALTREXONE BROMIDE 8 MILLIGRAM(S): 12 INJECTION, SOLUTION SUBCUTANEOUS at 12:45

## 2022-12-11 RX ADMIN — PIPERACILLIN AND TAZOBACTAM 25 GRAM(S): 4; .5 INJECTION, POWDER, LYOPHILIZED, FOR SOLUTION INTRAVENOUS at 17:58

## 2022-12-11 RX ADMIN — SENNA PLUS 2 TABLET(S): 8.6 TABLET ORAL at 22:53

## 2022-12-11 RX ADMIN — ENOXAPARIN SODIUM 40 MILLIGRAM(S): 100 INJECTION SUBCUTANEOUS at 17:37

## 2022-12-11 RX ADMIN — POLYETHYLENE GLYCOL 3350 17 GRAM(S): 17 POWDER, FOR SOLUTION ORAL at 17:38

## 2022-12-11 RX ADMIN — Medication 1 ENEMA: at 00:15

## 2022-12-11 NOTE — PROGRESS NOTE ADULT - ATTENDING COMMENTS
47 year old female with chronic consitpation, GERD presented with abdominal pain    CT indicating severe large stool burden, BL hydronephrosis, emphysematous cystitis      #opioid induced constipation: continue enema more frequently, may need manual disimpaction, however RN reporting patient did have BM earlier. order relistor 15mg x1 stat. continue miralax, senna, dulcolax     #suspected emphysematous cystitis: continue zosyn. f/u urine cx. ?if air focus from instrumentation     #BL hydronephrosis: in the setting of UTI and constipation. continue vargas for now until clinically improves then TOV. urology consulted 47 year old female with chronic consitpation, GERD presented with abdominal pain    CT indicating severe large stool burden, BL hydronephrosis, emphysematous cystitis      #opioid induced constipation: continue enema more frequently, may need manual disimpaction, however RN reporting patient did have BM earlier. order relistor IV x1. continue miralax, senna, dulcolax. monitor stool count     #suspected emphysematous cystitis: continue zosyn. f/u urine cx. ?if air focus from instrumentation     #BL hydronephrosis: in the setting of UTI and constipation. continue vargas for now until clinically improves then TOV. urology consulted

## 2022-12-11 NOTE — H&P ADULT - ASSESSMENT
47F PMH Chronic constipation, GERD p/w progressive abdominal pain. Found to have large stool burden likely in s/o recent surgery and Opioid-induced constipation and UTI.

## 2022-12-11 NOTE — CHART NOTE - NSCHARTNOTEFT_GEN_A_CORE
Others' Prescriptions  Patient Name: Nereida DueñasBirth Date: 1975  Address: [address] [place] [id number]: Female  Rx Written	Rx Dispensed	Drug	Quantity	Days Supply	Prescriber Name	Prescriber Kelley #	Payment Method	Dispenser  07/27/2022	07/29/2022	oxycodone-acetaminophen 5-325 mg tablet	30	7	Gabino Uribe MD	[id number]	[facility]

## 2022-12-11 NOTE — PATIENT PROFILE ADULT - FUNCTIONAL ASSESSMENT - BASIC MOBILITY 6.
3-calculated by average/Not able to assess (calculate score using [facility] averaging method)

## 2022-12-11 NOTE — DISCHARGE NOTE PROVIDER - NSDCCPTREATMENT_GEN_ALL_CORE_FT
PRINCIPAL PROCEDURE  Procedure: CT abdomen pelvis w con  Findings and Treatment: CT ABDOMEN AND PELVIS IC  PROCEDURE DATE: 12/10/2022  INTERPRETATION: CLINICAL INFORMATION: Diffuse pain and constipation  COMPARISON: CT abdomen pelvis 3/4/2020.  CONTRAST/COMPLICATIONS:  IV Contrast: Omnipaque 350 90 cc administered 10 cc discarded  Oral Contrast: NONE  Complications: None reported at time of study completion  PROCEDURE:  CT of the Abdomen and Pelvis was performed.  Sagittal and coronal reformats were performed.  FINDINGS:  LOWER CHEST: Right middle lobe subsegmental atelectasis.  LIVER: Within normal limits.  BILE DUCTS: Normal caliber.  GALLBLADDER: Contracted.  SPLEEN: Within normal limits.  PANCREAS: Within normal limits.  ADRENALS: Within normal limits.  KIDNEYS/URETERS: Mild bilateral hydronephrosis to the level of the ureteropelvic junction.  BLADDER: Moderately distended with a focus of antidependent gas.  REPRODUCTIVE ORGANS: Uterus and adnexa within normal limits.  BOWEL: No overt bowel obstruction. Appendix is normal.  Severe stool burden throughout the colon, particularly the right and transverse colon, with small bowel fecalization. Cecum measures 6.6 cm.  PERITONEUM: Trace mesenteric ascites.  VESSELS: Within normal limits.  RETROPERITONEUM/LYMPH NODES: No lymphadenopathy.  ABDOMINAL WALL: Within normal limits.  BONES: Within normal limits.  IMPRESSION:  Large stool burden, with small bowel fecalization and proximal colonic distention.  Mild bilateral hydronephrosis.  Air in the urinary bladder. Correlate for recent instrumentation.

## 2022-12-11 NOTE — DISCHARGE NOTE PROVIDER - NSDCFUADDAPPT_GEN_ALL_CORE_FT
Pt has scheduled urology appointment 12/19 at 8AM with Dr. Pedraza at [address] [place] [address]

## 2022-12-11 NOTE — DISCHARGE NOTE PROVIDER - NSDCFUSCHEDAPPT_GEN_ALL_CORE_FT
Oneal Santana  [facility] Physician [facility]  UROLOGY [address] [facility]  Scheduled Appointment: 12/19/2022

## 2022-12-11 NOTE — CONSULT NOTE ADULT - SUBJECTIVE AND OBJECTIVE BOX
HPI:  Patient is a 47y Female PMH GERD and chronic constipation p/w abdominal pain. Pt had right knee arthroscopy on . She has not had a bowel movement since the surgery. Has been taking oxycodone and a stool softener. Patient endorses intermittent abdominal pain over the past 2 weeks, but worsened over yesterday and today. The pain is located throughout her abdomen. Endorses nausea and vomiting when she tries to defecate. She also notes dysuria over the past 2 weeks as well and states she rarely feels like she completely empties when voiding and over has to double void. Notes some right flank pain, but is mild in nature and occasional chills. She denies hematuria, fever, previous  hx. Of note, required a vargas catheter in  after giving birth to her child, never since. She does not think she had a catheter during her recent arthroscopy.  In the ED, patient AVSS. Labs showed WBC 18, H/H 12.0/37.6, Cr 0.76. UA grossly positive. Lactate 2.5.  CT abd pelvis showed large stool burden, mild b/l hydro to the level of the UPJ and a distended bladder with a focus of air.    PAST MEDICAL & SURGICAL HISTORY:  No pertinent past medical history        FAMILY HISTORY:    SOCIAL HISTORY:   Tobacco hx:  MEDICATIONS  (STANDING):    MEDICATIONS  (PRN):    Allergies    No Known Allergies    Intolerances        REVIEW OF SYSTEMS: Pertinent positives and negatives as stated in HPI, otherwise negative    Vital signs  T(C): 36.7 (12-10-22 @ 17:30), Max: 36.7 (12-10-22 @ 17:30)  HR: 88 (12-10-22 @ 21:25)  BP: 129/90 (12-10-22 @ 21:25)  SpO2: 98% (12-10-22 @ 21:25)  Wt(kg): --    Output      Physical Exam  Gen: NAD, uncomfortable appearing  Pulm: No respiratory distress, no subcostal retractions  Abd: Soft, NT, ND  Back: mild + R CVAT  MSK: No edema present    LABS:        12-10 @ 19:32    WBC 18.22 / Hct 37.6  / SCr 0.76     12-10    131<L>  |  99  |  14  ----------------------------<  105<H>  4.2   |  18<L>  |  0.76    Ca    9.3      10 Dec 2022 19:32    TPro  8.1  /  Alb  4.2  /  TBili  0.4  /  DBili  x   /  AST  23  /  ALT  27  /  AlkPhos  57  12-10      Urinalysis Basic - ( 10 Dec 2022 23:57 )    Color: Yellow / Appearance: Slightly Turbid / S.015 / pH: x  Gluc: x / Ketone: Negative  / Bili: Negative / Urobili: Negative   Blood: x / Protein: Negative / Nitrite: Positive   Leuk Esterase: Negative / RBC: 1 /hpf / WBC 10 /HPF   Sq Epi: x / Non Sq Epi: 1 /hpf / Bacteria: Many        Urine Cx: pending      Radiology:    ACC: [id number] EXAM:  CT ABDOMEN AND PELVIS IC                          PROCEDURE DATE:  12/10/2022          INTERPRETATION:  CLINICAL INFORMATION: Diffuse pain and constipation    COMPARISON: CT abdomen pelvis 3/4/2020.    CONTRAST/COMPLICATIONS:  IV Contrast: Omnipaque 350  90 cc administered   10 cc discarded  Oral Contrast: NONE  Complications: None reported at time of study completion    PROCEDURE:  CT of the Abdomen and Pelvis was performed.  Sagittal and coronal reformats were performed.    FINDINGS:  LOWER CHEST: Right middle lobe subsegmental atelectasis.    LIVER: Within normal limits.  BILE DUCTS: Normal caliber.  GALLBLADDER: Contracted.  SPLEEN: Within normal limits.  PANCREAS: Within normal limits.  ADRENALS: Within normal limits.  KIDNEYS/URETERS: Mild bilateral hydronephrosis to the level of the   ureteropelvic junction.    BLADDER: Moderately distended with a focus of antidependent gas.  REPRODUCTIVE ORGANS: Uterus and adnexa within normal limits.    BOWEL: No overt bowel obstruction. Appendix is normal.  Severe stool burden throughout the colon, particularly the right and   transverse colon, with small bowel fecalization.  Cecum measures 6.6 cm.    PERITONEUM: Trace mesenteric ascites.  VESSELS: Within normal limits.  RETROPERITONEUM/LYMPH NODES: No lymphadenopathy.  ABDOMINAL WALL: Within normal limits.  BONES: Within normal limits.    IMPRESSION:  Large stool burden, with small bowel fecalization and proximal colonic   distention.    Mild bilateral hydronephrosis.  Air in the urinary bladder. Correlate for recent instrumentation.    --- End of Report ---          LATHA FISHER MD; Resident Radiologist  This document has been electronically signed.  ENRIQUE BLACKWOOD MD; Attending Radiologist  This document has been electronically signed. Dec 10 2022 11:43PM

## 2022-12-11 NOTE — H&P ADULT - HISTORY OF PRESENT ILLNESS
47F PMH Chronic constipation, GERD p/w progressive abdominal pain. She had right knee arthroscopy on 12/4 and had not had BM since surgery. She has been taking oxycodone and colace. Pain had been intermittent over the past 2-weeks however has gotten worse over the past few days. She also reports n/v associated with trying to have a BM. She also reports dysuria, incomplete bladder emptying and urinary urgency/frequency in the past few weeks. Denied fevers/chills, hematuria, melena/hematochezia.

## 2022-12-11 NOTE — DISCHARGE NOTE PROVIDER - NSDCMRMEDTOKEN_GEN_ALL_CORE_FT
Colace 100 mg oral capsule: 1 cap(s) orally 2 times a day  naproxen 375 mg oral tablet: 1 tab(s) orally 2 times a day, As Needed  oxycodone-acetaminophen 5 mg-325 mg oral tablet: 1 tab(s) orally every 6 hours, As Needed  Zofran 8 mg oral tablet: 1 tab(s) orally 3 times a day, As Needed   ciprofloxacin 500 mg oral tablet: 1 tab(s) orally 2 times a day   Dulcolax Laxative 5 mg oral delayed release tablet: 1 tab(s) orally once a day  naproxen 375 mg oral tablet: 1 tab(s) orally 2 times a day, As Needed  polyethylene glycol 3350 oral powder for reconstitution: 17 gram(s) orally 2 times a day  senna leaf extract oral tablet: 2 tab(s) orally once a day (at bedtime)  Zofran 8 mg oral tablet: 1 tab(s) orally 3 times a day, As Needed

## 2022-12-11 NOTE — H&P ADULT - NSICDXPASTMEDICALHX_GEN_ALL_CORE_FT
PAST MEDICAL HISTORY:  GERD (gastroesophageal reflux disease)     History of chronic constipation     No pertinent past medical history

## 2022-12-11 NOTE — H&P ADULT - PROBLEM SELECTOR PLAN 2
-CT also revealing for mild b/l hydro to level of UPJ and distended bladder with focus of air  -along with pos UA c/f emphysematous cystitis  -Urology consulted and recs appreciated  -vargas placed  -will c/w zosyn for now pending cultures

## 2022-12-11 NOTE — CONSULT NOTE ADULT - ATTENDING COMMENTS
47F PMH GERD and chronic constipation p/w abdominal pain, CT was obtained and found to have an impressive large stool bruden with mild b/l hydro 2/2 massively distended bladder with a small foci of air within the lumen of the bladder. Unclear if pt had instrumentation during her knee arthroscopy procedure (vargas vs straight cath). Pt is afebrile with VSS. Can maintain vargas to gravity and monitor vitals and leukocytosis. Keep on empiric abx for UTI and follow up urine culture. Would also recommend aggressive bowel regiment/disimpaction as her stool burden can also contribute to urinary retention/incomplete bladder emptying.

## 2022-12-11 NOTE — DISCHARGE NOTE PROVIDER - NSDCHOSPICE_GEN_A_CORE
Patient:   SABAS RODGERS            MRN: [id number]            FIN: [id number]               Age:   43 years     Sex:  Female     :  76   Associated Diagnoses:   None   Author:   Bryan Richards DO      Basic Information   Additional information: Chief Complaint from Nursing Triage Note : Chief Complaint   10/09/17 07:30           Chief Complaint           heart racing  .      History of Present Illness   The patient is a 42 yo F with a PMH SVT that presents with complaint of palpitaitons. Occurred this morning with intermittent palpiations.  Patient came to the emergency department for further evaluation and treatment.  Denies any chest pain or shortness of breath or dizziness.  States that she still feels symptoms intermittently.  Reports having a history of SVT and is being followed by Dr. Shola Aquino, cardiology. No tx prior to arrival.      Review of Systems   Constitutional: No F/C, fatigue, diaphoresis  HENT: No ear pain, hearing loss, rhinorrhea, sore throat  Eyes: No blurry vision, double vision, or eye pain  Respiratory: No cough, difficulty in breathing  Cardiovascular: No chest pain or palpitations  Gastrointestinal: No N/V, no diarrhea or abdominal pain  Genitourinary: No dysuria, polyuria, hematuria  Musculoskeletal: No pain in extremities, no weakness, no myalgia  Skin: No rash  Neurological:  no focal weakness, no paresthesia         Health Status   Allergies:    No allergies have been recorded..      Past Medical/ Family/ Social History   Problem list:    No qualifying data available  .   Medical History: SVT    Surgical History: None    Family History:  Noncontributary    Social History:  Tobacco: denies  Alcohol: denies         Physical Examination               Vital Signs   Vital Signs   10/09/17 07:35           Temperature Tympanic      96.9 F  LOW                             Peripheral Pulse Rate     88 bpm                             Respiratory Rate          18 br/min                              Systolic Blood Pressure   123 mmHg                             Diastolic Blood Pressure  66 mmHg                             Mean Arterial Pressure    85 mmHg                             Oxygen Saturation         98 %  .   Oxygen saturation.     Nurses notes and vital signs reviewed    Constitutional: Calm, NAD, non-toxic appearing  Head: Atraumatic, normocephalic   Eyes: EOMI, PERRL  ENT:  No rhinorrhea, oropharynx without erythema or exudate   Neck: No lymphadenopathy   Respiratory: No respiratory distress, no accessory muscle usage, no W/R/R  Cardiovascular: Normal S1/S2, r/r/r, no murmur  Gastrointestinal: Soft, NT/ND    Skin: No rash, No laceration or abrasion  Neurological: Awake and alert ***, cranial nerves grossly intact, No focal neurological defects  Musculoskeletal: Moving all extremities, No C/C/E, No calf TTP         Medical Decision Making   Electrocardiogram:  Time 10/09/17 08:06:00, rate 90, normal sinus rhythm, EP Interp, Normal sinus rhythm, normal axis, no ST segment abnormalities, isolated T-wave inversion in lead V3.    Results review:  Lab results : Lab View   10/09/17 08:00           Urine Preg POC            Negative    10/09/17 07:37           Glucose Lvl               115 mg/dL  HI                             BUN                       14 mg/dL                             Creatinine                0.82 mg/dL                             eGFR AfrAmer              >60 mL/min/1.73m2  NA                             eGFR NonAfrAmer           >60 mL/min/1.73m2  NA                             Sodium                    136 mEq/L                             Potassium                 4.2 mEq/L                             Chloride                  106 mEq/L                             TCO2                      24 mEq/L                             AGAP                      10 mEq/L                             Calcium                   9.3 mg/dL                              No Phosphorus                2.3 mg/dL                             Magnesium Level           1.8 mg/dL                             Troponin I                0.01 ng/mL                             TSH                       3.159 mIU/mL                             WBC                       9.1 K/cumm                             RBC                       5.02 M/cumm                             Hgb                       12.9 g/dL                             Hct                       40 %                             MCV                       79 FL                             MCH                       26 pg                             MCHC                      32 g/dL                             RDW                       14.2 %                             Platelet                  208 K/cumm                             NRBC                      0.0 %                             Abs Neutro                5.6 K/cumm                             Neutrophil                62 %  NA                             Abs Lymph                 2.7 K/cumm                             Lymphocyte                30 %  NA                             Abs Mono                  0.4 K/cumm                             Monocyte                  5 %  NA                             Immature Gran             0.4 %  HI                             Eosinophil                2 %                             Basophil                  0 %                             D-Dimer                   1.00 mg/L FEU  HI  .   Chest X-Ray:  PROCEDURE: XR Chest Portable    TECHNIQUES: Single portable chest x-ray was obtained.    TIME: 0751 hours.    COMPARISON: None    INDICATIONS: Palpitations    FINDINGS: The cardiomediastinal silhouette is unremarkable for patient's age.  The lungs are slightly hyper aerated without focal consolidations.  No pneumothorax pleural effusion are evident.    IMPRESSION: No acute consolidations.  , PROCEDURE:  CT Pulmonary Angio    CLINICAL  INDICATION:  Palpitations.    TECHNIQUE:  Contrast enhanced helical CT pulmonary angiography images were obtained.  Routine axial and coronal reformations as well as post-processed MIP reformations were obtained.     Adjustment of the mA and/or kV was done based on the patient's size.    CONTRAST: 100 mL Omnipaque 350    COMPARISON:  None    FINDINGS:        There is near adequate opacification of the pulmonary arterial system. There is no acute pulmonary embolism to the segmental pulmonary artery level. The great vessels are normal in caliber. There is no gross acute aortic pathology. The heart size is within normal limits. There is no pericardial effusion.    The central airways are patent. There is no significant bronchial wall thickening. No pneumothorax or pleural effusion is identified. There is a pleural-based right middle lobe pulmonary nodule measuring 5 mm in diameter (3/215). There is an additional small pulmonary nodule in the posterior lateral right lower lobe (3/190). There is no focal consolidation or discrete pulmonary mass.    There is no lymphadenopathy in the chest. There are mild degenerative changes in the spine. The visualized portions of the upper abdomen are unremarkable. There is a single prominent low right paraesophageal lymph node measuring 6 mm in short axis, nonspecific.      IMPRESSION:     1.  No acute pulmonary embolism. No acute aortic pathology.     2.  Right middle and lower lobe pulmonary nodules. Per the Fleischner Society Guidelines (Radiology 2017; 284:228-243), nodules less than 6 mm: If the patient is at LOW RISK for lung cancer, no further followup is needed. If the patient is at HIGH RISK for lung cancer, a followup CT thorax in 12 months is optional. If the nodule is unchanged at that time, no further followup is needed.    3.  Single prominent low paraesophageal lymph node, nonspecific. No lymphadenopathy by CT size criteria.  .    Notes:  43 female with past medical  history of SVT presents with palpitations.  No SVT noted in the emergency department; only arrhythmia noted is occasional PAC.  Potassium within normal limits, noted that the patient's magnesium and phosphorus were low and they were replaced in the emergency department.  Pulmonary nodules noted on a CT scan which was performed as the patient was reporting some weakness, is no pulmonary embolism on CT scan.  I discussed findings with the patient's cardiologist who is Dr. Shola Aquino, that recc Metroprolol 12.5 mg BID PRN palpitations. Recc f/u with cardiologist in 1 week.  Otherwise w/u unremarkable, normal EKG.Verbal discharge and return precautions were discussed..      Reexamination/ Reevaluation   Re-examination/Re-evaluation:   Procedure   Pulse Ox Interpretation  Measurement: 99%  Oxygen: Room Air  Interpretation: Normal  Interpreted by: Bryan Richards DO     Rhythm Strip Monitor Interpretation  Indication: Palpitations  Rate:80  Rhythm: NSR, occassional PAC  Interpreted by: Bryan Richards DO       Impression and Plan   Diagnosis   Palpitations  PAC  Pulmonary Nodules   Plan   Condition: Improved.    Prescriptions: [facility] prescriptions   Pharmacy:  metoprolol tartrate 12.5 mg oral tablet (Prescribe): 1 tab(s), PO, BID, NPI [id number], PRN:  palpitations, 20 tab, 0 Refill(s).    Patient was given the following educational materials: Palpitations, Palpitations.    Follow up with: Follow up with primary care provider Within 1 to 2 days Call for follow up appointment; Return to Emergency Department Within As needed Return if symptoms worsen, Return to Emergency Department Within As needed Return if symptoms worsen; Follow up with primary care provider Within 1 to 2 days Call for follow up appointment.

## 2022-12-11 NOTE — PROGRESS NOTE ADULT - PROBLEM SELECTOR PLAN 2
-CT also revealing for mild b/l hydro to level of UPJ and distended bladder with focus of air  -along with pos UA c/f emphysematous cystitis  -Urology consulted and recs appreciated  -vargas placed  -will c/w zosyn for now pending cultures -CT also revealing for mild b/l hydro to level of UPJ and distended bladder with focus of air  -along with pos UA c/f emphysematous cystitis vs possible vargas placement with arthroscopy on 12/4.  -Urology consulted and recs appreciated  -vargas placed  -will c/w zosyn for now pending cultures

## 2022-12-11 NOTE — DISCHARGE NOTE PROVIDER - HOSPITAL COURSE
47 year old woman with chronic constipation and GERD who is here for abdominal pain. CT abdomen done showing evidence of large stool burden, b/l hydronephrosis and air present in bladder. Concern for opiate-induced constipation given recent knee arthroscopy and oxycodone intake. Patient given 2x fleet enema, bowel regimen and had 2 large BM. Also given methylnaltrexone which --->>. Patient found to have evidence of UTI and hydronephrosis likely 2/2 to constipation. Urology consulted with concern for emphysematous cystitis, patient had vargas placed and started on Zosyn pending culture sensitivities. To follow-up with PCP.     47 year old woman with chronic constipation and GERD who is here for abdominal pain. CT abdomen done showing evidence of large stool burden, b/l hydronephrosis and air present in bladder. Concern for opiate-induced constipation given recent knee arthroscopy and oxycodone intake. Patient given 2x fleet enema, bowel regimen and had 2 large BM. Also given methylnaltrexone for opioid inducted constipation. After pt started having BMs, pt endorsed improvement in abdominal pain. Patient found to have evidence of UTI and hydronephrosis likely 2/2 to constipation. Urology consulted with concern for emphysematous cystitis, patient had vargas placed and started on Zosyn pending culture sensitivities. To follow-up with PCP.     47 year old woman with chronic constipation and GERD who is here for abdominal pain. CT abdomen done showing evidence of large stool burden, b/l hydronephrosis and air present in bladder. Concern for opiate-induced constipation given recent knee arthroscopy and oxycodone intake. Patient given 2x fleet enema, bowel regimen and had 2 large BM. Also given methylnaltrexone for opioid inducted constipation. After pt started having BMs, pt endorsed improvement in abdominal pain. Patient found to have evidence of UTI and hydronephrosis likely 2/2 to constipation. Urology consulted with concern for emphysematous cystitis, patient had vargas placed and started on Zosyn. Pt to be discharged on cipro pending final culture results and sensitivity. Pt to follow up with urology and primary care provider.

## 2022-12-11 NOTE — DISCHARGE NOTE PROVIDER - NSDCCPCAREPLAN_GEN_ALL_CORE_FT
PRINCIPAL DISCHARGE DIAGNOSIS  Diagnosis: Constipation  Assessment and Plan of Treatment: You came into the hospital with abdominal pain. We did a CT of your abdomen which showed a large stool burden. Your constipation is likely due to taking too much opioid painkillers. We did enemas, gave you medications and your bowel movements became regular.      SECONDARY DISCHARGE DIAGNOSES  Diagnosis: Acute UTI  Assessment and Plan of Treatment: On CT you were found to have evidence of blockage of drainage of your kidneys. It was likely due to the constipation. We tested your urine and you had evidence of infection. CT also showed air in your bladder which was abnormal. We started you on IV antibiotics to treat your infection.     PRINCIPAL DISCHARGE DIAGNOSIS  Diagnosis: Constipation  Assessment and Plan of Treatment: You came into the hospital with abdominal pain. We did a CT of your abdomen which showed a large stool burden. Your constipation is likely due to taking too much opioid painkillers. We did enemas, gave you medications and your bowel movements became regular. Please continue to take the bowel regimen as prescribed in order to continue having regular bowel movements. If you develop worsening constipation and abdominal pain please go to the nearest emergency department for evaluation. Please follow up with your primary care provider within 2 weeks of discharge.      SECONDARY DISCHARGE DIAGNOSES  Diagnosis: Acute UTI  Assessment and Plan of Treatment: On CT you were found to have evidence of blockage of drainage of your kidneys. It was likely due to the constipation. We tested your urine and you had evidence of infection. CT also showed air in your bladder which was abnormal. We started you on IV antibiotics to treat your infection. Urology saw you and placed a vargas while you are undergoing treatment for this infection due to the presence of gas in your bladder. Please continue taking the antibiotics as prescribed. If you develop worsening symptoms of pain with urination, blood in the urine, severe abdominal pain please go to the nearest emergency department for evaluation. Please follow up with urology within 1 week of discharge. Please follow up with your primary care provider within 2 weeks of discharge.     PRINCIPAL DISCHARGE DIAGNOSIS  Diagnosis: Constipation  Assessment and Plan of Treatment: You came into the hospital with abdominal pain. We did a CT of your abdomen which showed a large stool burden. Your constipation is likely due to taking too much opioid painkillers. We did enemas, gave you medications and your bowel movements became regular. Please continue to take the bowel regimen as prescribed in order to continue having regular bowel movements. If you develop worsening constipation and abdominal pain please go to the nearest emergency department for evaluation. Please follow up with your primary care provider within 2 weeks of discharge.      SECONDARY DISCHARGE DIAGNOSES  Diagnosis: Acute UTI  Assessment and Plan of Treatment: On CT you were found to have evidence of blockage of drainage of your kidneys. It was likely due to the constipation. We tested your urine and you had evidence of infection. CT also showed air in your bladder which was abnormal. We started you on IV antibiotics to treat your infection. Urology saw you and placed a vargas while you are undergoing treatment for this infection due to the presence of gas in your bladder. Please continue taking the antibiotics as prescribed. The urine culture results are pending, and results can be followed up with your primary care provider. If you develop worsening symptoms of pain with urination, blood in the urine, severe abdominal pain please go to the nearest emergency department for evaluation. Please follow up with urology within 1 week of discharge. Please follow up with your primary care provider within 2 weeks of discharge.

## 2022-12-11 NOTE — H&P ADULT - NSHPREVIEWOFSYSTEMS_GEN_ALL_CORE
GEN: no night sweats or change in appetite  EYES: no changes in vision or diplopia   ENT: no epistaxis, sinus pain, gingival bleeding, odynophagia or dysphagia  CV: no CP, PND or palpitations  RESP: no cough, wheezing, or hemoptysis  GI: +abdominal pain, no hematemesis, hematochezia, or melena  : no dysuria, polyuria, or hematuria  MSK: no arthralgias or joint swelling   NEURO: no gross sensory changes, numbness, focal deficits  PSYCH: no depression or changes in concentration  HEME/ONC: no purpura, petechiae or night sweats  SKIN: no pruritus, hair loss or skin lesions

## 2022-12-11 NOTE — H&P ADULT - NSHPLABSRESULTS_GEN_ALL_CORE
12.0   18.22 )-----------( 329      ( 10 Dec 2022 19:32 )             37.6       12-10    131<L>  |  99  |  14  ----------------------------<  105<H>  4.2   |  18<L>  |  0.76    Ca    9.3      10 Dec 2022 19:32    TPro  8.1  /  Alb  4.2  /  TBili  0.4  /  DBili  x   /  AST  23  /  ALT  27  /  AlkPhos  57  12-10            LIVER FUNCTIONS - ( 10 Dec 2022 19:32 )  Alb: 4.2 g/dL / Pro: 8.1 g/dL / ALK PHOS: 57 U/L / ALT: 27 U/L / AST: 23 U/L / GGT: x                 Urinalysis Basic - ( 10 Dec 2022 23:57 )    Color: Yellow / Appearance: Slightly Turbid / S.015 / pH: x  Gluc: x / Ketone: Negative  / Bili: Negative / Urobili: Negative   Blood: x / Protein: Negative / Nitrite: Positive   Leuk Esterase: Negative / RBC: 1 /hpf / WBC 10 /HPF   Sq Epi: x / Non Sq Epi: 1 /hpf / Bacteria: Many        I have personally reviewed imaging  I have personally reviewed EKG

## 2022-12-11 NOTE — H&P ADULT - NSICDXFAMILYHX_GEN_ALL_CORE_FT
FAMILY HISTORY:  Father  Still living? Unknown  FH: throat cancer, Age at diagnosis: Age Unknown    Mother  Still living? Unknown  Family history of chronic constipation, Age at diagnosis: Age Unknown

## 2022-12-11 NOTE — H&P ADULT - NSHPPHYSICALEXAM_GEN_ALL_CORE
Vital Signs Last 24 Hrs  T(C): 36.8 (11 Dec 2022 05:40), Max: 36.8 (11 Dec 2022 03:30)  T(F): 98.2 (11 Dec 2022 05:40), Max: 98.2 (11 Dec 2022 03:30)  HR: 79 (11 Dec 2022 05:40) (79 - 99)  BP: 118/74 (11 Dec 2022 05:40) (118/74 - 132/83)  BP(mean): 89 (11 Dec 2022 05:40) (89 - 89)  RR: 16 (11 Dec 2022 05:40) (16 - 18)  SpO2: 97% (11 Dec 2022 05:40) (97% - 99%)    Parameters below as of 11 Dec 2022 05:40  Patient On (Oxygen Delivery Method): room air

## 2022-12-11 NOTE — CONSULT NOTE ADULT - ASSESSMENT
47y Female PMH GERD and chronic constipation p/w abdominal pain. In the ED, patient AVSS. Labs showed WBC 18, H/H 12.0/37.6, Cr 0.76. UA grossly positive. Lactate 2.5.  CT abd pelvis showed large stool burden, mild b/l hydro to the level of the UPJ and a distended bladder with a focus of air.  -focus of air in the setting of positive UA and lack of recent instrumentation concerning for emphysematous cystitis  -b/l hydro to UPJ likely 2/2 compression from stool burden  -f/u cultures  -PLEASE PLACE ALSTON CATHETER for maximal drainage in the setting of emphysematous cystitis  -broad spectrum antibiotics  -d/w Dr. Seay 47y Female PMH GERD and chronic constipation p/w abdominal pain. In the ED, patient AVSS. Labs showed WBC 18, H/H 12.0/37.6, Cr 0.76. UA grossly positive. Lactate 2.5.  CT abd pelvis showed large stool burden, mild b/l hydro to the level of the UPJ and a distended bladder with a focus of air.  -focus of air in the setting of positive UA and lack of recent instrumentation concerning for emphysematous cystitis  -b/l hydro to UPJ likely 2/2 compression from stool burden  -f/u cultures  -PLEASE PLACE ALSTON CATHETER for maximal drainage in the setting of emphysematous cystitis  -broad spectrum antibiotics  -patient likely needs fecal disimpaction and bowel regimen  -d/w Dr. Seay

## 2022-12-11 NOTE — PROGRESS NOTE ADULT - SUBJECTIVE AND OBJECTIVE BOX
PROGRESS NOTE:   Authored by Brayan Bentley MD   Patient is a 47y old  Female who presents with a chief complaint of Abdominal pain (11 Dec 2022 07:19)      SUBJECTIVE / OVERNIGHT EVENTS:      ADDITIONAL REVIEW OF SYSTEMS:    MEDICATIONS  (STANDING):  acetaminophen     Tablet .. 650 milliGRAM(s) Oral every 6 hours  enoxaparin Injectable 40 milliGRAM(s) SubCutaneous every 24 hours  famotidine    Tablet 20 milliGRAM(s) Oral daily  piperacillin/tazobactam IVPB. 3.375 Gram(s) IV Intermittent once  piperacillin/tazobactam IVPB.- 3.375 Gram(s) IV Intermittent once  piperacillin/tazobactam IVPB.- 3.375 Gram(s) IV Intermittent once  polyethylene glycol 3350 17 Gram(s) Oral two times a day  senna 2 Tablet(s) Oral at bedtime    MEDICATIONS  (PRN):  aluminum hydroxide/magnesium hydroxide/simethicone Suspension 30 milliLiter(s) Oral every 4 hours PRN Dyspepsia  ibuprofen  Tablet. 600 milliGRAM(s) Oral every 6 hours PRN Moderate Pain (4 - 6)  melatonin 3 milliGRAM(s) Oral at bedtime PRN Insomnia  ondansetron Injectable 4 milliGRAM(s) IV Push every 8 hours PRN Nausea and/or Vomiting      CAPILLARY BLOOD GLUCOSE        I&O's Summary      PHYSICAL EXAM:  Vital Signs Last 24 Hrs  T(C): 36.8 (11 Dec 2022 05:40), Max: 36.8 (11 Dec 2022 03:30)  T(F): 98.2 (11 Dec 2022 05:40), Max: 98.2 (11 Dec 2022 03:30)  HR: 79 (11 Dec 2022 05:40) (79 - 99)  BP: 118/74 (11 Dec 2022 05:40) (118/74 - 132/83)  BP(mean): 89 (11 Dec 2022 05:40) (89 - 89)  RR: 16 (11 Dec 2022 05:40) (16 - 18)  SpO2: 97% (11 Dec 2022 05:40) (97% - 99%)    Parameters below as of 11 Dec 2022 05:40  Patient On (Oxygen Delivery Method): room air        GENERAL: No apparent distress.   HEAD:  Atraumatic, Normocephalic  EYES: EOMI, PERRLA, conjunctiva and sclera clear  NECK: Supple, no lymphadenopathy, no elevated JVP  CHEST/LUNG: Clear to auscultation bilateral and symmetric; No wheezes, rales, or rhonchi  HEART: S1 and S2 normal. Regular rate and rhythm; No murmurs, rubs, or gallops  ABDOMEN: Soft, non-tender, non-distended; normal bowel sounds  EXTREMITIES:  2+ peripheral pulses b/l, No clubbing, cyanosis, or edema  NEUROLOGY: A&O x 3, no focal deficits  SKIN: No rashes or lesions    LABS:                        10.4   13.24 )-----------( 275      ( 11 Dec 2022 07:09 )             32.1     12-11    140  |  110<H>  |  10  ----------------------------<  117<H>  3.5   |  19<L>  |  0.77    Ca    8.1<L>      11 Dec 2022 07:09  Phos  2.9     12-11  Mg     2.1     12-11    TPro  8.1  /  Alb  4.2  /  TBili  0.4  /  DBili  x   /  AST  23  /  ALT  27  /  AlkPhos  57  12-10          Urinalysis Basic - ( 10 Dec 2022 23:57 )    Color: Yellow / Appearance: Slightly Turbid / S.015 / pH: x  Gluc: x / Ketone: Negative  / Bili: Negative / Urobili: Negative   Blood: x / Protein: Negative / Nitrite: Positive   Leuk Esterase: Negative / RBC: 1 /hpf / WBC 10 /HPF   Sq Epi: x / Non Sq Epi: 1 /hpf / Bacteria: Many          RADIOLOGY & ADDITIONAL TESTS:  Lab Results Reviewed   Imaging Reviewed  Electrocardiogram Reviewed   PROGRESS NOTE:   Authored by Brayan Bentley MD   Patient is a 47y old  Female who presents with a chief complaint of Abdominal pain (11 Dec 2022 07:19)      SUBJECTIVE / OVERNIGHT EVENTS:  2 large BM overnight after fleet enema  1BM at ~11AM, small.    Patient reporting abdominal pain, no nausea, vomiting, melena, hematuria, or hemoptysis.  Patient notes tenderness to palpation of right knee.     ADDITIONAL REVIEW OF SYSTEMS:    MEDICATIONS  (STANDING):  acetaminophen     Tablet .. 650 milliGRAM(s) Oral every 6 hours  enoxaparin Injectable 40 milliGRAM(s) SubCutaneous every 24 hours  famotidine    Tablet 20 milliGRAM(s) Oral daily  piperacillin/tazobactam IVPB. 3.375 Gram(s) IV Intermittent once  piperacillin/tazobactam IVPB.- 3.375 Gram(s) IV Intermittent once  piperacillin/tazobactam IVPB.- 3.375 Gram(s) IV Intermittent once  polyethylene glycol 3350 17 Gram(s) Oral two times a day  senna 2 Tablet(s) Oral at bedtime    MEDICATIONS  (PRN):  aluminum hydroxide/magnesium hydroxide/simethicone Suspension 30 milliLiter(s) Oral every 4 hours PRN Dyspepsia  ibuprofen  Tablet. 600 milliGRAM(s) Oral every 6 hours PRN Moderate Pain (4 - 6)  melatonin 3 milliGRAM(s) Oral at bedtime PRN Insomnia  ondansetron Injectable 4 milliGRAM(s) IV Push every 8 hours PRN Nausea and/or Vomiting      CAPILLARY BLOOD GLUCOSE        I&O's Summary      PHYSICAL EXAM:  Vital Signs Last 24 Hrs  T(C): 36.8 (11 Dec 2022 05:40), Max: 36.8 (11 Dec 2022 03:30)  T(F): 98.2 (11 Dec 2022 05:40), Max: 98.2 (11 Dec 2022 03:30)  HR: 79 (11 Dec 2022 05:40) (79 - 99)  BP: 118/74 (11 Dec 2022 05:40) (118/74 - 132/83)  BP(mean): 89 (11 Dec 2022 05:40) (89 - 89)  RR: 16 (11 Dec 2022 05:40) (16 - 18)  SpO2: 97% (11 Dec 2022 05:40) (97% - 99%)    Parameters below as of 11 Dec 2022 05:40  Patient On (Oxygen Delivery Method): room air        GENERAL: No apparent distress.   HEAD:  Atraumatic, Normocephalic  EYES: EOMI, conjunctiva and sclera clear  NECK: Supple, no lymphadenopathy, no elevated JVP  CHEST/LUNG: Clear to auscultation bilateral and symmetric; No wheezes, rales, or rhonchi  HEART: S1 and S2 normal. Regular rate and rhythm; No murmurs, rubs, or gallops  ABDOMEN: Soft, ttp of low abdomen, no tensing of abdominal muscles  EXTREMITIES:  2+ peripheral pulses b/l, No clubbing, cyanosis, or edema  NEUROLOGY: A&O x 3, no focal deficits  SKIN: No rashes or lesions  MSK: Rt knee no swelling, erythema, induration, or discharge. Skin intact. Active ROM decreased.    LABS:                        10.4   13.24 )-----------( 275      ( 11 Dec 2022 07:09 )             32.1     12-11    140  |  110<H>  |  10  ----------------------------<  117<H>  3.5   |  19<L>  |  0.77    Ca    8.1<L>      11 Dec 2022 07:09  Phos  2.9     12-11  Mg     2.1     12-11    TPro  8.1  /  Alb  4.2  /  TBili  0.4  /  DBili  x   /  AST  23  /  ALT  27  /  AlkPhos  57  12-10          Urinalysis Basic - ( 10 Dec 2022 23:57 )    Color: Yellow / Appearance: Slightly Turbid / S.015 / pH: x  Gluc: x / Ketone: Negative  / Bili: Negative / Urobili: Negative   Blood: x / Protein: Negative / Nitrite: Positive   Leuk Esterase: Negative / RBC: 1 /hpf / WBC 10 /HPF   Sq Epi: x / Non Sq Epi: 1 /hpf / Bacteria: Many          RADIOLOGY & ADDITIONAL TESTS:  Lab Results Reviewed   Imaging Reviewed  Electrocardiogram Reviewed   PROGRESS NOTE:   Authored by Brayan Bentley MD   Patient is a 47y old  Female who presents with a chief complaint of Abdominal pain (11 Dec 2022 07:19)      SUBJECTIVE / OVERNIGHT EVENTS:  2 large BM overnight after fleet enema  1BM at ~11AM, small.    Patient reporting abdominal pain, no nausea, vomiting, melena, hematuria, or hemoptysis.  Patient notes tenderness to palpation of right knee.     Oxycodone bottle noted by Dr. Olsen at bedside.     ADDITIONAL REVIEW OF SYSTEMS:    MEDICATIONS  (STANDING):  acetaminophen     Tablet .. 650 milliGRAM(s) Oral every 6 hours  enoxaparin Injectable 40 milliGRAM(s) SubCutaneous every 24 hours  famotidine    Tablet 20 milliGRAM(s) Oral daily  piperacillin/tazobactam IVPB. 3.375 Gram(s) IV Intermittent once  piperacillin/tazobactam IVPB.- 3.375 Gram(s) IV Intermittent once  piperacillin/tazobactam IVPB.- 3.375 Gram(s) IV Intermittent once  polyethylene glycol 3350 17 Gram(s) Oral two times a day  senna 2 Tablet(s) Oral at bedtime    MEDICATIONS  (PRN):  aluminum hydroxide/magnesium hydroxide/simethicone Suspension 30 milliLiter(s) Oral every 4 hours PRN Dyspepsia  ibuprofen  Tablet. 600 milliGRAM(s) Oral every 6 hours PRN Moderate Pain (4 - 6)  melatonin 3 milliGRAM(s) Oral at bedtime PRN Insomnia  ondansetron Injectable 4 milliGRAM(s) IV Push every 8 hours PRN Nausea and/or Vomiting      CAPILLARY BLOOD GLUCOSE        I&O's Summary      PHYSICAL EXAM:  Vital Signs Last 24 Hrs  T(C): 36.8 (11 Dec 2022 05:40), Max: 36.8 (11 Dec 2022 03:30)  T(F): 98.2 (11 Dec 2022 05:40), Max: 98.2 (11 Dec 2022 03:30)  HR: 79 (11 Dec 2022 05:40) (79 - 99)  BP: 118/74 (11 Dec 2022 05:40) (118/74 - 132/83)  BP(mean): 89 (11 Dec 2022 05:40) (89 - 89)  RR: 16 (11 Dec 2022 05:40) (16 - 18)  SpO2: 97% (11 Dec 2022 05:40) (97% - 99%)    Parameters below as of 11 Dec 2022 05:40  Patient On (Oxygen Delivery Method): room air        GENERAL: No apparent distress.   HEAD:  Atraumatic, Normocephalic  EYES: EOMI, conjunctiva and sclera clear  NECK: Supple, no lymphadenopathy, no elevated JVP  CHEST/LUNG: Clear to auscultation bilateral and symmetric; No wheezes, rales, or rhonchi  HEART: S1 and S2 normal. Regular rate and rhythm; No murmurs, rubs, or gallops  ABDOMEN: Soft, ttp of low abdomen, no tensing of abdominal muscles  EXTREMITIES:  2+ peripheral pulses b/l, No clubbing, cyanosis, or edema  NEUROLOGY: A&O x 3, no focal deficits  SKIN: No rashes or lesions  MSK: Rt knee no swelling, erythema, induration, or discharge. Skin intact. Active ROM decreased.    LABS:                        10.4   13.24 )-----------( 275      ( 11 Dec 2022 07:09 )             32.1     12-11    140  |  110<H>  |  10  ----------------------------<  117<H>  3.5   |  19<L>  |  0.77    Ca    8.1<L>      11 Dec 2022 07:09  Phos  2.9     12-11  Mg     2.1     12-11    TPro  8.1  /  Alb  4.2  /  TBili  0.4  /  DBili  x   /  AST  23  /  ALT  27  /  AlkPhos  57  12-10          Urinalysis Basic - ( 10 Dec 2022 23:57 )    Color: Yellow / Appearance: Slightly Turbid / S.015 / pH: x  Gluc: x / Ketone: Negative  / Bili: Negative / Urobili: Negative   Blood: x / Protein: Negative / Nitrite: Positive   Leuk Esterase: Negative / RBC: 1 /hpf / WBC 10 /HPF   Sq Epi: x / Non Sq Epi: 1 /hpf / Bacteria: Many          RADIOLOGY & ADDITIONAL TESTS:  Lab Results Reviewed   Imaging Reviewed  Electrocardiogram Reviewed

## 2022-12-11 NOTE — PATIENT PROFILE ADULT - NSPROLASTMENSTRUAL_GEN_A_NUR
Spoke with pt. Discussed that her pain is not a new pain, the only difference is that it is beginning to travel to her hips.  , denies contrast allergy, denies DM, reports taking ASA 81mg.  Will discuss with provider if pt needs to be seen in office or if she can be scheduled for injection.      11/14/22

## 2022-12-11 NOTE — PATIENT PROFILE ADULT - FALL HARM RISK - HARM RISK INTERVENTIONS

## 2022-12-11 NOTE — ED CLERICAL - NS ED CLERK UNITS
Spoke to mother she will be in to  at 10 am.  Form copied and in front office tickler and form in front office drawer. APER

## 2022-12-11 NOTE — H&P ADULT - PROBLEM SELECTOR PLAN 1
-CT A/P showing large stool burden   -likely in s/o recent surgery and opioid use  -s/p fleet enema x2 in ED with moderate volume BM x2 in ED per pt  -c/w aggressive BM regimen including Miralax BID, Senna, enemas prn  -Avoid opioids  -pain control with NSAIDs, tylenol standing

## 2022-12-11 NOTE — PROGRESS NOTE ADULT - PROBLEM SELECTOR PLAN 1
-CT A/P showing large stool burden   -likely in s/o recent surgery and opioid use  -s/p fleet enema x2 in ED with moderate volume BM x2 in ED per pt  -c/w aggressive BM regimen including Miralax BID, Senna, enemas prn  -Avoid opioids  -pain control with NSAIDs, tylenol standing -CT A/P showing large stool burden   -likely in s/o recent surgery and opioid use  -s/p fleet enema x2 in ED with moderate volume BM x2 in ED per pt  -c/w aggressive BM regimen including Miralax BID, Senna, enemas prn  -Avoid opioids  -pain control with NSAIDs, tylenol standing  - 1 small BM 12/11 AM  - methylnaltrexone for opiate-induced constipation  - enema q 4H  - suppository qHS  -

## 2022-12-12 LAB
ALBUMIN SERPL ELPH-MCNC: 3.6 G/DL — SIGNIFICANT CHANGE UP (ref 3.3–5)
ALP SERPL-CCNC: 44 U/L — SIGNIFICANT CHANGE UP (ref 40–120)
ALT FLD-CCNC: 19 U/L — SIGNIFICANT CHANGE UP (ref 10–45)
ANION GAP SERPL CALC-SCNC: 12 MMOL/L — SIGNIFICANT CHANGE UP (ref 5–17)
AST SERPL-CCNC: 17 U/L — SIGNIFICANT CHANGE UP (ref 10–40)
BILIRUB SERPL-MCNC: 0.3 MG/DL — SIGNIFICANT CHANGE UP (ref 0.2–1.2)
BUN SERPL-MCNC: 11 MG/DL — SIGNIFICANT CHANGE UP (ref 7–23)
CALCIUM SERPL-MCNC: 8.4 MG/DL — SIGNIFICANT CHANGE UP (ref 8.4–10.5)
CHLORIDE SERPL-SCNC: 107 MMOL/L — SIGNIFICANT CHANGE UP (ref 96–108)
CO2 SERPL-SCNC: 22 MMOL/L — SIGNIFICANT CHANGE UP (ref 22–31)
CREAT SERPL-MCNC: 0.88 MG/DL — SIGNIFICANT CHANGE UP (ref 0.5–1.3)
EGFR: 82 ML/MIN/1.73M2 — SIGNIFICANT CHANGE UP
GLUCOSE SERPL-MCNC: 84 MG/DL — SIGNIFICANT CHANGE UP (ref 70–99)
HCT VFR BLD CALC: 32.1 % — LOW (ref 34.5–45)
HGB BLD-MCNC: 10.3 G/DL — LOW (ref 11.5–15.5)
MAGNESIUM SERPL-MCNC: 2.4 MG/DL — SIGNIFICANT CHANGE UP (ref 1.6–2.6)
MCHC RBC-ENTMCNC: 22.9 PG — LOW (ref 27–34)
MCHC RBC-ENTMCNC: 32.1 GM/DL — SIGNIFICANT CHANGE UP (ref 32–36)
MCV RBC AUTO: 71.3 FL — LOW (ref 80–100)
MELD SCORE WITH DIALYSIS: SIGNIFICANT CHANGE UP
MELD SCORE WITHOUT DIALYSIS: SIGNIFICANT CHANGE UP POINTS
NRBC # BLD: 0 /100 WBCS — SIGNIFICANT CHANGE UP (ref 0–0)
PHOSPHATE SERPL-MCNC: 2.3 MG/DL — LOW (ref 2.5–4.5)
PLATELET # BLD AUTO: 273 K/UL — SIGNIFICANT CHANGE UP (ref 150–400)
POTASSIUM SERPL-MCNC: 3.4 MMOL/L — LOW (ref 3.5–5.3)
POTASSIUM SERPL-SCNC: 3.4 MMOL/L — LOW (ref 3.5–5.3)
PROT SERPL-MCNC: 6.8 G/DL — SIGNIFICANT CHANGE UP (ref 6–8.3)
RBC # BLD: 4.5 M/UL — SIGNIFICANT CHANGE UP (ref 3.8–5.2)
RBC # FLD: 15.2 % — HIGH (ref 10.3–14.5)
SODIUM SERPL-SCNC: 141 MMOL/L — SIGNIFICANT CHANGE UP (ref 135–145)
WBC # BLD: 7.51 K/UL — SIGNIFICANT CHANGE UP (ref 3.8–10.5)
WBC # FLD AUTO: 7.51 K/UL — SIGNIFICANT CHANGE UP (ref 3.8–10.5)

## 2022-12-12 PROCEDURE — 99233 SBSQ HOSP IP/OBS HIGH 50: CPT | Mod: GC

## 2022-12-12 RX ORDER — POTASSIUM CHLORIDE 20 MEQ
40 PACKET (EA) ORAL ONCE
Refills: 0 | Status: COMPLETED | OUTPATIENT
Start: 2022-12-12 | End: 2022-12-12

## 2022-12-12 RX ADMIN — Medication 650 MILLIGRAM(S): at 06:20

## 2022-12-12 RX ADMIN — PIPERACILLIN AND TAZOBACTAM 25 GRAM(S): 4; .5 INJECTION, POWDER, LYOPHILIZED, FOR SOLUTION INTRAVENOUS at 13:07

## 2022-12-12 RX ADMIN — Medication 650 MILLIGRAM(S): at 13:37

## 2022-12-12 RX ADMIN — POLYETHYLENE GLYCOL 3350 17 GRAM(S): 17 POWDER, FOR SOLUTION ORAL at 05:25

## 2022-12-12 RX ADMIN — Medication 650 MILLIGRAM(S): at 01:20

## 2022-12-12 RX ADMIN — Medication 650 MILLIGRAM(S): at 13:07

## 2022-12-12 RX ADMIN — FAMOTIDINE 20 MILLIGRAM(S): 10 INJECTION INTRAVENOUS at 13:06

## 2022-12-12 RX ADMIN — Medication 650 MILLIGRAM(S): at 22:19

## 2022-12-12 RX ADMIN — Medication 10 MILLIGRAM(S): at 22:14

## 2022-12-12 RX ADMIN — Medication 650 MILLIGRAM(S): at 00:22

## 2022-12-12 RX ADMIN — ENOXAPARIN SODIUM 40 MILLIGRAM(S): 100 INJECTION SUBCUTANEOUS at 22:19

## 2022-12-12 RX ADMIN — POLYETHYLENE GLYCOL 3350 17 GRAM(S): 17 POWDER, FOR SOLUTION ORAL at 22:14

## 2022-12-12 RX ADMIN — Medication 650 MILLIGRAM(S): at 23:00

## 2022-12-12 RX ADMIN — PIPERACILLIN AND TAZOBACTAM 25 GRAM(S): 4; .5 INJECTION, POWDER, LYOPHILIZED, FOR SOLUTION INTRAVENOUS at 22:12

## 2022-12-12 RX ADMIN — Medication 650 MILLIGRAM(S): at 05:25

## 2022-12-12 RX ADMIN — PIPERACILLIN AND TAZOBACTAM 25 GRAM(S): 4; .5 INJECTION, POWDER, LYOPHILIZED, FOR SOLUTION INTRAVENOUS at 00:22

## 2022-12-12 RX ADMIN — PIPERACILLIN AND TAZOBACTAM 25 GRAM(S): 4; .5 INJECTION, POWDER, LYOPHILIZED, FOR SOLUTION INTRAVENOUS at 05:24

## 2022-12-12 RX ADMIN — Medication 40 MILLIEQUIVALENT(S): at 13:06

## 2022-12-12 NOTE — PROGRESS NOTE ADULT - ATTENDING COMMENTS
47F PMH Chronic constipation, GERD p/w progressive abdominal pain. Found to have large stool burden likely in s/o recent surgery and Opioid-induced constipation and UTI.    #Constipation: likely opioid induced . (recent ortho surg) . now having BMs. will cont with bowel regiments  #emphysematous cystitis: air noted in bladder on imaging. will cont with IV abx and vargas. follow up culture result.  following.

## 2022-12-12 NOTE — PROGRESS NOTE ADULT - SUBJECTIVE AND OBJECTIVE BOX
Subjective    Patient seen and examined. States she is comfortable but continued bladder discomfort. Reports BM overnight.   Vargas in place.     Objective    Vital signs  T(F): , Max: 99.4 (12-11-22 @ 17:12)  HR: 68 (12-12-22 @ 05:22)  BP: 102/57 (12-12-22 @ 05:22)  SpO2: 97% (12-12-22 @ 05:22)  Wt(kg): --    Output     12-11 @ 07:01  -  12-12 @ 07:00  --------------------------------------------------------  IN: 560 mL / OUT: 2300 mL / NET: -1740 mL        General: NAD  Abdomen: soft/non-tender/non-distended  : vargas in place draining clear yellow urine     Labs      12-12 @ 07:00    WBC 7.51  / Hct 32.1  / SCr 0.88     12-11 @ 07:09    WBC 13.24 / Hct 32.1  / SCr 0.77

## 2022-12-12 NOTE — PROGRESS NOTE ADULT - ASSESSMENT
47y Female PMH GERD and chronic constipation p/w abdominal pain. In the ED, patient AVSS. Labs showed WBC 18, H/H 12.0/37.6, Cr 0.76. UA grossly positive. Lactate 2.5.  CT abd pelvis showed large stool burden, mild b/l hydro to the level of the UPJ and a distended bladder with a focus of air.    -focus of air in the setting of positive UA and lack of recent instrumentation concerning for emphysematous cystitis  -b/l hydro to UPJ likely 2/2 compression from stool burden  -continue bowel regimen  -f/u cultures  -continue vargas catheter for maximal drainage in the setting of emphysematous cystitis until infection is adequately treated  -broad spectrum antibiotics per primary team  -please call urology with further questions     -d/w Dr. Dempsey   [facility]  [address] [place] [address], [address] [address] [phone]   47y Female PMH GERD and chronic constipation p/w abdominal pain. In the ED, patient AVSS. Labs showed WBC 18, H/H 12.0/37.6, Cr 0.76. UA grossly positive. Lactate 2.5.  CT abd pelvis showed large stool burden, mild b/l hydro to the level of the UPJ and a distended bladder with a focus of air.    -focus of air in the setting of positive UA and lack of recent instrumentation concerning for emphysematous cystitis  -b/l hydro to UPJ likely 2/2 compression from stool burden  -continue bowel regimen  -f/u cultures  -continue vargas catheter for maximal drainage in the setting of emphysematous cystitis until infection is adequately treated  -broad spectrum antibiotics per primary team  -please call urology with further questions   -discussed with Dr. Guzman, agree to transition to PO antibiotics for 10 more days and pt to discharge home with vargas catheter and f/u in office for TOV and renal US in 10 days    -d/w Dr. Dempsey   [facility]  [address] [place] [address], [address] [address] [phone]

## 2022-12-12 NOTE — PROGRESS NOTE ADULT - SUBJECTIVE AND OBJECTIVE BOX
Martina Gray MD   Internal Medicine, PGY 1  Contact via TEAMS.     SUBJECTIVE / OVERNIGHT EVENTS:  - Pt seen and examined at bedside  - DEMOND    MEDICATIONS  (STANDING):  acetaminophen     Tablet .. 650 milliGRAM(s) Oral every 6 hours  bisacodyl Suppository 10 milliGRAM(s) Rectal at bedtime  enoxaparin Injectable 40 milliGRAM(s) SubCutaneous every 24 hours  famotidine    Tablet 20 milliGRAM(s) Oral daily  piperacillin/tazobactam IVPB.. 3.375 Gram(s) IV Intermittent every 8 hours  polyethylene glycol 3350 17 Gram(s) Oral two times a day  senna 2 Tablet(s) Oral at bedtime    MEDICATIONS  (PRN):  aluminum hydroxide/magnesium hydroxide/simethicone Suspension 30 milliLiter(s) Oral every 4 hours PRN Dyspepsia  ibuprofen  Tablet. 600 milliGRAM(s) Oral every 6 hours PRN Moderate Pain (4 - 6)  melatonin 3 milliGRAM(s) Oral at bedtime PRN Insomnia  ondansetron Injectable 4 milliGRAM(s) IV Push every 8 hours PRN Nausea and/or Vomiting        22 @ 07:01  -  22 @ 07:00  --------------------------------------------------------  IN: 560 mL / OUT: 2300 mL / NET: -1740 mL        PHYSICAL EXAM:  Vital Signs Last 24 Hrs  T(C): 37.2 (12 Dec 2022 05:22), Max: 37.4 (11 Dec 2022 17:12)  T(F): 99 (12 Dec 2022 05:22), Max: 99.4 (11 Dec 2022 17:12)  HR: 68 (12 Dec 2022 05:22) (68 - 96)  BP: 102/57 (12 Dec 2022 05:22) (101/54 - 122/75)  BP(mean): --  RR: 18 (12 Dec 2022 05:22) (18 - 18)  SpO2: 97% (12 Dec 2022 05:22) (96% - 99%)    Parameters below as of 12 Dec 2022 05:22  Patient On (Oxygen Delivery Method): room air        CAPILLARY BLOOD GLUCOSE        I&O's Summary    11 Dec 2022 07:01  -  12 Dec 2022 07:00  --------------------------------------------------------  IN: 560 mL / OUT: 2300 mL / NET: -1740 mL        CONSTITUTIONAL: NAD, well-developed  RESPIRATORY: Normal respiratory effort; lungs are clear to auscultation bilaterally  CARDIOVASCULAR: Regular rate and rhythm, normal S1 and S2, no murmur/rub/gallop; No lower extremity edema; Peripheral pulses are 2+ bilaterally  ABDOMEN: Nontender to palpation, normoactive bowel sounds, no rebound/guarding; No hepatosplenomegaly  MUSCLOSKELETAL: no clubbing or cyanosis of digits; no joint swelling or tenderness to palpation  PSYCH: A+O to person, place, and time; affect appropriate    LABS:                        10.4   13.24 )-----------( 275      ( 11 Dec 2022 07:09 )             32.1     12-11    140  |  110<H>  |  10  ----------------------------<  117<H>  3.5   |  19<L>  |  0.77    Ca    8.1<L>      11 Dec 2022 07:09  Phos  2.9     12-11  Mg     2.1     12-11    TPro  8.1  /  Alb  4.2  /  TBili  0.4  /  DBili  x   /  AST  23  /  ALT  27  /  AlkPhos  57  12-10          Urinalysis Basic - ( 10 Dec 2022 23:57 )    Color: Yellow / Appearance: Slightly Turbid / S.015 / pH: x  Gluc: x / Ketone: Negative  / Bili: Negative / Urobili: Negative   Blood: x / Protein: Negative / Nitrite: Positive   Leuk Esterase: Negative / RBC: 1 /hpf / WBC 10 /HPF   Sq Epi: x / Non Sq Epi: 1 /hpf / Bacteria: Many          IMAGING:    [X] All pertinent imaging reviewed by me Martina Gray MD   Internal Medicine, PGY 1  Contact via TEAMS.     SUBJECTIVE / OVERNIGHT EVENTS:  - Pt seen and examined at bedside  - Pt had several BM yesterday, expressed relief of abdominal pain   - Pt denies N/V, SOB, chest pain, palpitations, diarrhea, constipation, HA or visual changes.   - NAEON    MEDICATIONS  (STANDING):  acetaminophen     Tablet .. 650 milliGRAM(s) Oral every 6 hours  bisacodyl Suppository 10 milliGRAM(s) Rectal at bedtime  enoxaparin Injectable 40 milliGRAM(s) SubCutaneous every 24 hours  famotidine    Tablet 20 milliGRAM(s) Oral daily  piperacillin/tazobactam IVPB.. 3.375 Gram(s) IV Intermittent every 8 hours  polyethylene glycol 3350 17 Gram(s) Oral two times a day  senna 2 Tablet(s) Oral at bedtime    MEDICATIONS  (PRN):  aluminum hydroxide/magnesium hydroxide/simethicone Suspension 30 milliLiter(s) Oral every 4 hours PRN Dyspepsia  ibuprofen  Tablet. 600 milliGRAM(s) Oral every 6 hours PRN Moderate Pain (4 - 6)  melatonin 3 milliGRAM(s) Oral at bedtime PRN Insomnia  ondansetron Injectable 4 milliGRAM(s) IV Push every 8 hours PRN Nausea and/or Vomiting        22 @ 07:01  -  22 @ 07:00  --------------------------------------------------------  IN: 560 mL / OUT: 2300 mL / NET: -1740 mL        PHYSICAL EXAM:  Vital Signs Last 24 Hrs  T(C): 37.2 (12 Dec 2022 05:22), Max: 37.4 (11 Dec 2022 17:12)  T(F): 99 (12 Dec 2022 05:22), Max: 99.4 (11 Dec 2022 17:12)  HR: 68 (12 Dec 2022 05:22) (68 - 96)  BP: 102/57 (12 Dec 2022 05:22) (101/54 - 122/75)  BP(mean): --  RR: 18 (12 Dec 2022 05:22) (18 - 18)  SpO2: 97% (12 Dec 2022 05:22) (96% - 99%)    Parameters below as of 12 Dec 2022 05:22  Patient On (Oxygen Delivery Method): room air        CAPILLARY BLOOD GLUCOSE        I&O's Summary    11 Dec 2022 07:01  -  12 Dec 2022 07:00  --------------------------------------------------------  IN: 560 mL / OUT: 2300 mL / NET: -1740 mL        CONSTITUTIONAL: NAD  RESPIRATORY: Normal respiratory effort; lungs are clear to auscultation bilaterally  CARDIOVASCULAR: Regular rate and rhythm, normal S1 and S2, no murmur/rub/gallop; No lower extremity edema; Peripheral pulses are 2+ bilaterally  ABDOMEN: mild TTP lower abdomen, normoactive bowel sounds, no rebound/guarding; No hepatosplenomegaly  MUSCLOSKELETAL: no clubbing or cyanosis of digits; no joint swelling or tenderness to palpation  PSYCH: A+O to person, place, and time; affect appropriate    LABS:                        10.4   13.24 )-----------( 275      ( 11 Dec 2022 07:09 )             32.1     12-11    140  |  110<H>  |  10  ----------------------------<  117<H>  3.5   |  19<L>  |  0.77    Ca    8.1<L>      11 Dec 2022 07:09  Phos  2.9     12-11  Mg     2.1     12-11    TPro  8.1  /  Alb  4.2  /  TBili  0.4  /  DBili  x   /  AST  23  /  ALT  27  /  AlkPhos  57  12-10          Urinalysis Basic - ( 10 Dec 2022 23:57 )    Color: Yellow / Appearance: Slightly Turbid / S.015 / pH: x  Gluc: x / Ketone: Negative  / Bili: Negative / Urobili: Negative   Blood: x / Protein: Negative / Nitrite: Positive   Leuk Esterase: Negative / RBC: 1 /hpf / WBC 10 /HPF   Sq Epi: x / Non Sq Epi: 1 /hpf / Bacteria: Many          IMAGING:    [X] All pertinent imaging reviewed by me

## 2022-12-12 NOTE — PROGRESS NOTE ADULT - PROBLEM SELECTOR PLAN 1
-CT A/P showing large stool burden   -likely in s/o recent surgery and opioid use  -s/p fleet enema x2 in ED with moderate volume BM x2 in ED per pt  -c/w aggressive BM regimen including Miralax BID, Senna, enemas prn  -Avoid opioids  -pain control with NSAIDs, tylenol standing  - 1 small BM 12/11 AM  - methylnaltrexone for opiate-induced constipation  - enema q 4H  - suppository qHS  - -CT A/P showing large stool burden   -likely in s/o recent surgery and opioid use  -s/p fleet enema x2 in ED with moderate volume BM x2 in ED per pt  -c/w BM regimen including Miralax BID, Senna  -Avoid opioids  -pain control with NSAIDs, tylenol standing  -pt had multiple BMs 12/11 and 12/12 AM   -d/c enema q4h  -CTM

## 2022-12-12 NOTE — PROGRESS NOTE ADULT - PROBLEM SELECTOR PLAN 2
-CT also revealing for mild b/l hydro to level of UPJ and distended bladder with focus of air  -along with pos UA c/f emphysematous cystitis vs possible vargas placement with arthroscopy on 12/4.  -Urology consulted and recs appreciated  -vargas placed  -will c/w zosyn for now pending cultures

## 2022-12-13 ENCOUNTER — TRANSCRIPTION ENCOUNTER (OUTPATIENT)
Age: 47
End: 2022-12-13

## 2022-12-13 VITALS
HEART RATE: 77 BPM | RESPIRATION RATE: 18 BRPM | TEMPERATURE: 99 F | DIASTOLIC BLOOD PRESSURE: 65 MMHG | SYSTOLIC BLOOD PRESSURE: 120 MMHG | OXYGEN SATURATION: 100 %

## 2022-12-13 PROBLEM — K21.9 GASTRO-ESOPHAGEAL REFLUX DISEASE WITHOUT ESOPHAGITIS: Chronic | Status: ACTIVE | Noted: 2022-12-11

## 2022-12-13 PROBLEM — Z87.19 PERSONAL HISTORY OF OTHER DISEASES OF THE DIGESTIVE SYSTEM: Chronic | Status: ACTIVE | Noted: 2022-12-11

## 2022-12-13 LAB
ALBUMIN SERPL ELPH-MCNC: 4 G/DL — SIGNIFICANT CHANGE UP (ref 3.3–5)
ALP SERPL-CCNC: 46 U/L — SIGNIFICANT CHANGE UP (ref 40–120)
ALT FLD-CCNC: 17 U/L — SIGNIFICANT CHANGE UP (ref 10–45)
ANION GAP SERPL CALC-SCNC: 10 MMOL/L — SIGNIFICANT CHANGE UP (ref 5–17)
AST SERPL-CCNC: 16 U/L — SIGNIFICANT CHANGE UP (ref 10–40)
BILIRUB SERPL-MCNC: 0.2 MG/DL — SIGNIFICANT CHANGE UP (ref 0.2–1.2)
BUN SERPL-MCNC: 10 MG/DL — SIGNIFICANT CHANGE UP (ref 7–23)
CALCIUM SERPL-MCNC: 9.3 MG/DL — SIGNIFICANT CHANGE UP (ref 8.4–10.5)
CHLORIDE SERPL-SCNC: 106 MMOL/L — SIGNIFICANT CHANGE UP (ref 96–108)
CO2 SERPL-SCNC: 24 MMOL/L — SIGNIFICANT CHANGE UP (ref 22–31)
CREAT SERPL-MCNC: 1.05 MG/DL — SIGNIFICANT CHANGE UP (ref 0.5–1.3)
EGFR: 66 ML/MIN/1.73M2 — SIGNIFICANT CHANGE UP
GLUCOSE SERPL-MCNC: 92 MG/DL — SIGNIFICANT CHANGE UP (ref 70–99)
HCT VFR BLD CALC: 35.2 % — SIGNIFICANT CHANGE UP (ref 34.5–45)
HGB BLD-MCNC: 11 G/DL — LOW (ref 11.5–15.5)
MAGNESIUM SERPL-MCNC: 2.3 MG/DL — SIGNIFICANT CHANGE UP (ref 1.6–2.6)
MCHC RBC-ENTMCNC: 22.8 PG — LOW (ref 27–34)
MCHC RBC-ENTMCNC: 31.3 GM/DL — LOW (ref 32–36)
MCV RBC AUTO: 72.9 FL — LOW (ref 80–100)
NRBC # BLD: 0 /100 WBCS — SIGNIFICANT CHANGE UP (ref 0–0)
PHOSPHATE SERPL-MCNC: 3.4 MG/DL — SIGNIFICANT CHANGE UP (ref 2.5–4.5)
PLATELET # BLD AUTO: 272 K/UL — SIGNIFICANT CHANGE UP (ref 150–400)
POTASSIUM SERPL-MCNC: 4 MMOL/L — SIGNIFICANT CHANGE UP (ref 3.5–5.3)
POTASSIUM SERPL-SCNC: 4 MMOL/L — SIGNIFICANT CHANGE UP (ref 3.5–5.3)
PROT SERPL-MCNC: 7.3 G/DL — SIGNIFICANT CHANGE UP (ref 6–8.3)
RBC # BLD: 4.83 M/UL — SIGNIFICANT CHANGE UP (ref 3.8–5.2)
RBC # FLD: 14.9 % — HIGH (ref 10.3–14.5)
SODIUM SERPL-SCNC: 140 MMOL/L — SIGNIFICANT CHANGE UP (ref 135–145)
WBC # BLD: 9.78 K/UL — SIGNIFICANT CHANGE UP (ref 3.8–10.5)
WBC # FLD AUTO: 9.78 K/UL — SIGNIFICANT CHANGE UP (ref 3.8–10.5)

## 2022-12-13 PROCEDURE — 36415 COLL VENOUS BLD VENIPUNCTURE: CPT

## 2022-12-13 PROCEDURE — 82947 ASSAY GLUCOSE BLOOD QUANT: CPT

## 2022-12-13 PROCEDURE — 84295 ASSAY OF SERUM SODIUM: CPT

## 2022-12-13 PROCEDURE — 87086 URINE CULTURE/COLONY COUNT: CPT

## 2022-12-13 PROCEDURE — 74019 RADEX ABDOMEN 2 VIEWS: CPT

## 2022-12-13 PROCEDURE — 96375 TX/PRO/DX INJ NEW DRUG ADDON: CPT

## 2022-12-13 PROCEDURE — 87077 CULTURE AEROBIC IDENTIFY: CPT

## 2022-12-13 PROCEDURE — 85018 HEMOGLOBIN: CPT

## 2022-12-13 PROCEDURE — 84100 ASSAY OF PHOSPHORUS: CPT

## 2022-12-13 PROCEDURE — 74177 CT ABD & PELVIS W/CONTRAST: CPT | Mod: MA

## 2022-12-13 PROCEDURE — 84702 CHORIONIC GONADOTROPIN TEST: CPT

## 2022-12-13 PROCEDURE — 85025 COMPLETE CBC W/AUTO DIFF WBC: CPT

## 2022-12-13 PROCEDURE — 84132 ASSAY OF SERUM POTASSIUM: CPT

## 2022-12-13 PROCEDURE — 99285 EMERGENCY DEPT VISIT HI MDM: CPT

## 2022-12-13 PROCEDURE — 81001 URINALYSIS AUTO W/SCOPE: CPT

## 2022-12-13 PROCEDURE — 80048 BASIC METABOLIC PNL TOTAL CA: CPT

## 2022-12-13 PROCEDURE — 83690 ASSAY OF LIPASE: CPT

## 2022-12-13 PROCEDURE — 80053 COMPREHEN METABOLIC PANEL: CPT

## 2022-12-13 PROCEDURE — 82330 ASSAY OF CALCIUM: CPT

## 2022-12-13 PROCEDURE — 84443 ASSAY THYROID STIM HORMONE: CPT

## 2022-12-13 PROCEDURE — 87186 SC STD MICRODIL/AGAR DIL: CPT

## 2022-12-13 PROCEDURE — 85014 HEMATOCRIT: CPT

## 2022-12-13 PROCEDURE — 87637 SARSCOV2&INF A&B&RSV AMP PRB: CPT

## 2022-12-13 PROCEDURE — 82435 ASSAY OF BLOOD CHLORIDE: CPT

## 2022-12-13 PROCEDURE — 85027 COMPLETE CBC AUTOMATED: CPT

## 2022-12-13 PROCEDURE — 99239 HOSP IP/OBS DSCHRG MGMT >30: CPT | Mod: GC

## 2022-12-13 PROCEDURE — 96374 THER/PROPH/DIAG INJ IV PUSH: CPT

## 2022-12-13 PROCEDURE — 82803 BLOOD GASES ANY COMBINATION: CPT

## 2022-12-13 PROCEDURE — 83735 ASSAY OF MAGNESIUM: CPT

## 2022-12-13 PROCEDURE — 83605 ASSAY OF LACTIC ACID: CPT

## 2022-12-13 RX ORDER — POLYETHYLENE GLYCOL 3350 17 G/17G
17 POWDER, FOR SOLUTION ORAL
Qty: 1020 | Refills: 0
Start: 2022-12-13 | End: 2023-01-11

## 2022-12-13 RX ORDER — CIPROFLOXACIN LACTATE 400MG/40ML
1 VIAL (ML) INTRAVENOUS
Qty: 16 | Refills: 0
Start: 2022-12-13 | End: 2022-12-20

## 2022-12-13 RX ORDER — SENNA PLUS 8.6 MG/1
2 TABLET ORAL
Qty: 60 | Refills: 0
Start: 2022-12-13 | End: 2023-01-12

## 2022-12-13 RX ORDER — DOCUSATE SODIUM 100 MG
1 CAPSULE ORAL
Qty: 0 | Refills: 0 | DISCHARGE

## 2022-12-13 RX ORDER — CIPROFLOXACIN LACTATE 400MG/40ML
1 VIAL (ML) INTRAVENOUS
Qty: 16 | Refills: 0
Start: 2022-12-13 | End: 2022-12-21

## 2022-12-13 RX ORDER — POLYETHYLENE GLYCOL 3350 17 G/17G
17 POWDER, FOR SOLUTION ORAL
Qty: 1020 | Refills: 0
Start: 2022-12-13 | End: 2023-01-12

## 2022-12-13 RX ORDER — SENNA PLUS 8.6 MG/1
2 TABLET ORAL
Qty: 60 | Refills: 0
Start: 2022-12-13 | End: 2023-01-11

## 2022-12-13 RX ORDER — OXYCODONE AND ACETAMINOPHEN 5; 325 MG/1; MG/1
1 TABLET ORAL
Qty: 0 | Refills: 0 | DISCHARGE

## 2022-12-13 RX ADMIN — POLYETHYLENE GLYCOL 3350 17 GRAM(S): 17 POWDER, FOR SOLUTION ORAL at 05:13

## 2022-12-13 RX ADMIN — POLYETHYLENE GLYCOL 3350 17 GRAM(S): 17 POWDER, FOR SOLUTION ORAL at 16:55

## 2022-12-13 RX ADMIN — Medication 650 MILLIGRAM(S): at 05:14

## 2022-12-13 RX ADMIN — ENOXAPARIN SODIUM 40 MILLIGRAM(S): 100 INJECTION SUBCUTANEOUS at 16:54

## 2022-12-13 RX ADMIN — Medication 650 MILLIGRAM(S): at 13:13

## 2022-12-13 RX ADMIN — PIPERACILLIN AND TAZOBACTAM 25 GRAM(S): 4; .5 INJECTION, POWDER, LYOPHILIZED, FOR SOLUTION INTRAVENOUS at 15:45

## 2022-12-13 RX ADMIN — FAMOTIDINE 20 MILLIGRAM(S): 10 INJECTION INTRAVENOUS at 12:43

## 2022-12-13 RX ADMIN — PIPERACILLIN AND TAZOBACTAM 25 GRAM(S): 4; .5 INJECTION, POWDER, LYOPHILIZED, FOR SOLUTION INTRAVENOUS at 05:14

## 2022-12-13 RX ADMIN — Medication 650 MILLIGRAM(S): at 12:43

## 2022-12-13 RX ADMIN — Medication 650 MILLIGRAM(S): at 16:55

## 2022-12-13 NOTE — PROGRESS NOTE ADULT - PROBLEM SELECTOR PLAN 1
-CT A/P showing large stool burden   -likely in s/o recent surgery and opioid use  -s/p fleet enema x2 in ED with moderate volume BM x2 in ED per pt  -c/w BM regimen including Miralax BID, Senna  -Avoid opioids  -pain control with NSAIDs, tylenol standing  -pt had multiple BMs 12/11 and 12/12 AM   -d/c enema q4h  -CTM

## 2022-12-13 NOTE — PROGRESS NOTE ADULT - PROBLEM SELECTOR PLAN 2
-CT also revealing for mild b/l hydro to level of UPJ and distended bladder with focus of air  -along with pos UA c/f emphysematous cystitis vs possible vargas placement with arthroscopy on 12/4.  -Urology consulted and recs appreciated  -vargas placed  -will c/w zosyn for now pending cultures -CT also revealing for mild b/l hydro to level of UPJ and distended bladder with focus of air  -along with pos UA c/f emphysematous cystitis vs possible vargas placement with arthroscopy on 12/4.  -Urology consulted and recs appreciated  -vargas placed, will remain while pt undergoing abx treatment   -will c/w zosyn for now pending cultures

## 2022-12-13 NOTE — PROGRESS NOTE ADULT - ATTENDING COMMENTS
47F PMH Chronic constipation, GERD p/w progressive abdominal pain. Found to have large stool burden likely in s/o recent surgery and Opioid-induced constipation and UTI.    #Constipation: likely opioid induced . (recent ortho surg) . now having BMs. will cont with bowel regiments  #emphysematous cystitis: air noted in bladder on imaging. will cont with abx and vargas. follow up culture result.  following.    d/c planning pending UCx.   d/c time 40. home with vargas and home care

## 2022-12-13 NOTE — PROGRESS NOTE ADULT - PROBLEM SELECTOR PLAN 4
-Diet: Regular  -DVT ppx: Lovenox qd

## 2022-12-13 NOTE — DISCHARGE NOTE NURSING/CASE MANAGEMENT/SOCIAL WORK - PATIENT PORTAL LINK FT
You can access the FollowMyHealth Patient Portal offered by [facility] by registering at the following website: [email or web address]. By joining [employer]’s FollowMyHealth portal, you will also be able to view your health information using other applications (apps) compatible with our system.

## 2022-12-13 NOTE — DISCHARGE NOTE NURSING/CASE MANAGEMENT/SOCIAL WORK - NSDCPEFALRISK_GEN_ALL_CORE
For information on Fall & Injury Prevention, visit: [email or web address] OR  [email or web address] OR  https://www.cdc.gov/steadi/patient.html

## 2022-12-13 NOTE — PROGRESS NOTE ADULT - SUBJECTIVE AND OBJECTIVE BOX
Martina Gray MD   Internal Medicine, PGY 1  Contact via TEAMS.     SUBJECTIVE / OVERNIGHT EVENTS:  - Pt seen and examined at bedside  - DEMOND    MEDICATIONS  (STANDING):  acetaminophen     Tablet .. 650 milliGRAM(s) Oral every 6 hours  bisacodyl Suppository 10 milliGRAM(s) Rectal at bedtime  enoxaparin Injectable 40 milliGRAM(s) SubCutaneous every 24 hours  famotidine    Tablet 20 milliGRAM(s) Oral daily  piperacillin/tazobactam IVPB.. 3.375 Gram(s) IV Intermittent every 8 hours  polyethylene glycol 3350 17 Gram(s) Oral two times a day  senna 2 Tablet(s) Oral at bedtime    MEDICATIONS  (PRN):  aluminum hydroxide/magnesium hydroxide/simethicone Suspension 30 milliLiter(s) Oral every 4 hours PRN Dyspepsia  ibuprofen  Tablet. 600 milliGRAM(s) Oral every 6 hours PRN Moderate Pain (4 - 6)  melatonin 3 milliGRAM(s) Oral at bedtime PRN Insomnia  ondansetron Injectable 4 milliGRAM(s) IV Push every 8 hours PRN Nausea and/or Vomiting        12-12-22 @ 07:01  -  12-13-22 @ 07:00  --------------------------------------------------------  IN: 600 mL / OUT: 2000 mL / NET: -1400 mL        PHYSICAL EXAM:  Vital Signs Last 24 Hrs  T(C): 36.7 (13 Dec 2022 04:46), Max: 37.1 (12 Dec 2022 12:05)  T(F): 98.1 (13 Dec 2022 04:46), Max: 98.7 (12 Dec 2022 12:05)  HR: 67 (13 Dec 2022 04:46) (67 - 78)  BP: 119/73 (13 Dec 2022 04:46) (103/58 - 119/73)  BP(mean): --  RR: 18 (13 Dec 2022 04:46) (18 - 18)  SpO2: 100% (13 Dec 2022 04:46) (97% - 100%)    Parameters below as of 13 Dec 2022 04:46  Patient On (Oxygen Delivery Method): room air        CAPILLARY BLOOD GLUCOSE        I&O's Summary    12 Dec 2022 07:01  -  13 Dec 2022 07:00  --------------------------------------------------------  IN: 600 mL / OUT: 2000 mL / NET: -1400 mL        CONSTITUTIONAL: NAD, well-developed  RESPIRATORY: Normal respiratory effort; lungs are clear to auscultation bilaterally  CARDIOVASCULAR: Regular rate and rhythm, normal S1 and S2, no murmur/rub/gallop; No lower extremity edema; Peripheral pulses are 2+ bilaterally  ABDOMEN: Nontender to palpation, normoactive bowel sounds, no rebound/guarding; No hepatosplenomegaly  MUSCLOSKELETAL: no clubbing or cyanosis of digits; no joint swelling or tenderness to palpation  PSYCH: A+O to person, place, and time; affect appropriate    LABS:                        10.3   7.51  )-----------( 273      ( 12 Dec 2022 07:00 )             32.1     12-12    141  |  107  |  11  ----------------------------<  84  3.4<L>   |  22  |  0.88    Ca    8.4      12 Dec 2022 07:00  Phos  2.3     12-12  Mg     2.4     12-12    TPro  6.8  /  Alb  3.6  /  TBili  0.3  /  DBili  x   /  AST  17  /  ALT  19  /  AlkPhos  44  12-12              Culture - Urine (collected 10 Dec 2022 23:57)  Source: Clean Catch Clean Catch (Midstream)  Preliminary Report (12 Dec 2022 12:52):    >100,000 CFU/ml Gram Negative Rods        IMAGING:    [X] All pertinent imaging reviewed by me Martina Gray MD   Internal Medicine, PGY 1  Contact via TEAMS.     SUBJECTIVE / OVERNIGHT EVENTS:  - Pt seen and examined at bedside  - Pt denies any new concerns/complaints ON. Denies fever, chills, N/V, SOB, chest pain, diarrhea, HA or visual changes.   - NAEON    MEDICATIONS  (STANDING):  acetaminophen     Tablet .. 650 milliGRAM(s) Oral every 6 hours  bisacodyl Suppository 10 milliGRAM(s) Rectal at bedtime  enoxaparin Injectable 40 milliGRAM(s) SubCutaneous every 24 hours  famotidine    Tablet 20 milliGRAM(s) Oral daily  piperacillin/tazobactam IVPB.. 3.375 Gram(s) IV Intermittent every 8 hours  polyethylene glycol 3350 17 Gram(s) Oral two times a day  senna 2 Tablet(s) Oral at bedtime    MEDICATIONS  (PRN):  aluminum hydroxide/magnesium hydroxide/simethicone Suspension 30 milliLiter(s) Oral every 4 hours PRN Dyspepsia  ibuprofen  Tablet. 600 milliGRAM(s) Oral every 6 hours PRN Moderate Pain (4 - 6)  melatonin 3 milliGRAM(s) Oral at bedtime PRN Insomnia  ondansetron Injectable 4 milliGRAM(s) IV Push every 8 hours PRN Nausea and/or Vomiting        12-12-22 @ 07:01  -  12-13-22 @ 07:00  --------------------------------------------------------  IN: 600 mL / OUT: 2000 mL / NET: -1400 mL        PHYSICAL EXAM:  Vital Signs Last 24 Hrs  T(C): 36.7 (13 Dec 2022 04:46), Max: 37.1 (12 Dec 2022 12:05)  T(F): 98.1 (13 Dec 2022 04:46), Max: 98.7 (12 Dec 2022 12:05)  HR: 67 (13 Dec 2022 04:46) (67 - 78)  BP: 119/73 (13 Dec 2022 04:46) (103/58 - 119/73)  BP(mean): --  RR: 18 (13 Dec 2022 04:46) (18 - 18)  SpO2: 100% (13 Dec 2022 04:46) (97% - 100%)    Parameters below as of 13 Dec 2022 04:46  Patient On (Oxygen Delivery Method): room air        CAPILLARY BLOOD GLUCOSE        I&O's Summary    12 Dec 2022 07:01  -  13 Dec 2022 07:00  --------------------------------------------------------  IN: 600 mL / OUT: 2000 mL / NET: -1400 mL        CONSTITUTIONAL: NAD  RESPIRATORY: Normal respiratory effort; lungs are clear to auscultation bilaterally  CARDIOVASCULAR: Regular rate and rhythm, normal S1 and S2, no murmur/rub/gallop; No lower extremity edema; Peripheral pulses are 2+ bilaterally  ABDOMEN: TTP lower quadrants, normoactive bowel sounds, no rebound/guarding; No hepatosplenomegaly  MUSCLOSKELETAL: no clubbing or cyanosis of digits; no joint swelling or tenderness to palpation  NEURO: no focal deficits   PSYCH: A+O to person, place, and time; affect appropriate    LABS:                        10.3   7.51  )-----------( 273      ( 12 Dec 2022 07:00 )             32.1     12-12    141  |  107  |  11  ----------------------------<  84  3.4<L>   |  22  |  0.88    Ca    8.4      12 Dec 2022 07:00  Phos  2.3     12-12  Mg     2.4     12-12    TPro  6.8  /  Alb  3.6  /  TBili  0.3  /  DBili  x   /  AST  17  /  ALT  19  /  AlkPhos  44  12-12              Culture - Urine (collected 10 Dec 2022 23:57)  Source: Clean Catch Clean Catch (Midstream)  Preliminary Report (12 Dec 2022 12:52):    >100,000 CFU/ml Gram Negative Rods        IMAGING:    [X] All pertinent imaging reviewed by me

## 2022-12-13 NOTE — PROGRESS NOTE ADULT - PROBLEM SELECTOR PLAN 3
-c/w start on pepcid in s/o NSAID use for pain control

## 2022-12-14 LAB
-  AMIKACIN: SIGNIFICANT CHANGE UP
-  AMOXICILLIN/CLAVULANIC ACID: SIGNIFICANT CHANGE UP
-  AMPICILLIN/SULBACTAM: SIGNIFICANT CHANGE UP
-  AMPICILLIN: SIGNIFICANT CHANGE UP
-  AZTREONAM: SIGNIFICANT CHANGE UP
-  CEFAZOLIN: SIGNIFICANT CHANGE UP
-  CEFEPIME: SIGNIFICANT CHANGE UP
-  CEFOXITIN: SIGNIFICANT CHANGE UP
-  CEFTRIAXONE: SIGNIFICANT CHANGE UP
-  CIPROFLOXACIN: SIGNIFICANT CHANGE UP
-  ERTAPENEM: SIGNIFICANT CHANGE UP
-  GENTAMICIN: SIGNIFICANT CHANGE UP
-  IMIPENEM: SIGNIFICANT CHANGE UP
-  LEVOFLOXACIN: SIGNIFICANT CHANGE UP
-  MEROPENEM: SIGNIFICANT CHANGE UP
-  NITROFURANTOIN: SIGNIFICANT CHANGE UP
-  PIPERACILLIN/TAZOBACTAM: SIGNIFICANT CHANGE UP
-  TOBRAMYCIN: SIGNIFICANT CHANGE UP
-  TRIMETHOPRIM/SULFAMETHOXAZOLE: SIGNIFICANT CHANGE UP
METHOD TYPE: SIGNIFICANT CHANGE UP

## 2022-12-14 RX ORDER — CEFDINIR 250 MG/5ML
1 POWDER, FOR SUSPENSION ORAL
Qty: 16 | Refills: 0
Start: 2022-12-14 | End: 2022-12-21

## 2022-12-15 LAB
-  AMIKACIN: SIGNIFICANT CHANGE UP
-  AZTREONAM: SIGNIFICANT CHANGE UP
-  CEFEPIME: SIGNIFICANT CHANGE UP
-  CEFTAZIDIME: SIGNIFICANT CHANGE UP
-  CIPROFLOXACIN: SIGNIFICANT CHANGE UP
-  GENTAMICIN: SIGNIFICANT CHANGE UP
-  IMIPENEM: SIGNIFICANT CHANGE UP
-  LEVOFLOXACIN: SIGNIFICANT CHANGE UP
-  MEROPENEM: SIGNIFICANT CHANGE UP
-  PIPERACILLIN/TAZOBACTAM: SIGNIFICANT CHANGE UP
-  TOBRAMYCIN: SIGNIFICANT CHANGE UP
CULTURE RESULTS: SIGNIFICANT CHANGE UP
METHOD TYPE: SIGNIFICANT CHANGE UP
ORGANISM # SPEC MICROSCOPIC CNT: SIGNIFICANT CHANGE UP
SPECIMEN SOURCE: SIGNIFICANT CHANGE UP

## 2022-12-19 ENCOUNTER — OUTPATIENT (OUTPATIENT)
Dept: OUTPATIENT SERVICES | Facility: HOSPITAL | Age: 47
LOS: 1 days | End: 2022-12-19
Payer: MEDICAID

## 2022-12-19 ENCOUNTER — APPOINTMENT (OUTPATIENT)
Dept: UROLOGY | Facility: CLINIC | Age: 47
End: 2022-12-19

## 2022-12-19 VITALS
HEART RATE: 76 BPM | HEIGHT: 65 IN | BODY MASS INDEX: 22.16 KG/M2 | TEMPERATURE: 97.2 F | DIASTOLIC BLOOD PRESSURE: 73 MMHG | WEIGHT: 133 LBS | SYSTOLIC BLOOD PRESSURE: 113 MMHG

## 2022-12-19 DIAGNOSIS — Z98.890 OTHER SPECIFIED POSTPROCEDURAL STATES: Chronic | ICD-10-CM

## 2022-12-19 DIAGNOSIS — R33.9 RETENTION OF URINE, UNSPECIFIED: ICD-10-CM

## 2022-12-19 PROCEDURE — 51700 IRRIGATION OF BLADDER: CPT

## 2022-12-19 PROCEDURE — ZZZZZ: CPT

## 2022-12-21 DIAGNOSIS — R33.9 RETENTION OF URINE, UNSPECIFIED: ICD-10-CM

## 2022-12-21 DIAGNOSIS — N13.30 UNSPECIFIED HYDRONEPHROSIS: ICD-10-CM

## 2023-02-10 ENCOUNTER — OUTPATIENT (OUTPATIENT)
Dept: OUTPATIENT SERVICES | Facility: HOSPITAL | Age: 48
LOS: 1 days | End: 2023-02-10

## 2023-02-10 ENCOUNTER — APPOINTMENT (OUTPATIENT)
Dept: RADIOLOGY | Facility: HOSPITAL | Age: 48
End: 2023-02-10
Payer: MEDICAID

## 2023-02-10 DIAGNOSIS — R33.9 RETENTION OF URINE, UNSPECIFIED: ICD-10-CM

## 2023-02-10 DIAGNOSIS — Z98.890 OTHER SPECIFIED POSTPROCEDURAL STATES: Chronic | ICD-10-CM

## 2023-02-10 PROCEDURE — 51610 INJECTION FOR BLADDER X-RAY: CPT

## 2023-02-10 PROCEDURE — 74450 X-RAY URETHRA/BLADDER: CPT | Mod: 26

## 2023-05-01 NOTE — ED ADULT TRIAGE NOTE - AS TEMP SITE
oral Olumiant Counseling: I discussed with the patient the risks of Olumiant therapy including but not limited to upper respiratory tract infections, shingles, cold sores, and nausea. Live vaccines should be avoided.  This medication has been linked to serious infections; higher rate of mortality; malignancy and lymphoproliferative disorders; major adverse cardiovascular events; thrombosis; gastrointestinal perforations; neutropenia; lymphopenia; anemia; liver enzyme elevations; and lipid elevations.

## 2023-06-01 ENCOUNTER — EMERGENCY (EMERGENCY)
Facility: HOSPITAL | Age: 48
LOS: 1 days | Discharge: HOME | End: 2023-06-01
Attending: EMERGENCY MEDICINE
Payer: COMMERCIAL

## 2023-06-01 VITALS
TEMPERATURE: 97 F | SYSTOLIC BLOOD PRESSURE: 132 MMHG | HEIGHT: 61 IN | OXYGEN SATURATION: 95 % | DIASTOLIC BLOOD PRESSURE: 85 MMHG | WEIGHT: 154.98 LBS | HEART RATE: 73 BPM | RESPIRATION RATE: 18 BRPM

## 2023-06-01 DIAGNOSIS — Z98.890 OTHER SPECIFIED POSTPROCEDURAL STATES: Chronic | ICD-10-CM

## 2023-06-01 LAB
ALBUMIN SERPL ELPH-MCNC: 4.2 G/DL — SIGNIFICANT CHANGE UP (ref 3.3–5)
ALP SERPL-CCNC: 60 U/L — SIGNIFICANT CHANGE UP (ref 40–120)
ALT FLD-CCNC: 16 U/L — SIGNIFICANT CHANGE UP (ref 10–45)
ANION GAP SERPL CALC-SCNC: 11 MMOL/L — SIGNIFICANT CHANGE UP (ref 5–17)
ANISOCYTOSIS BLD QL: SLIGHT — SIGNIFICANT CHANGE UP
AST SERPL-CCNC: 19 U/L — SIGNIFICANT CHANGE UP (ref 10–40)
BASOPHILS # BLD AUTO: 0 K/UL — SIGNIFICANT CHANGE UP (ref 0–0.2)
BASOPHILS NFR BLD AUTO: 0 % — SIGNIFICANT CHANGE UP (ref 0–2)
BILIRUB SERPL-MCNC: 0.4 MG/DL — SIGNIFICANT CHANGE UP (ref 0.2–1.2)
BUN SERPL-MCNC: 13 MG/DL — SIGNIFICANT CHANGE UP (ref 7–23)
CALCIUM SERPL-MCNC: 9 MG/DL — SIGNIFICANT CHANGE UP (ref 8.4–10.5)
CHLORIDE SERPL-SCNC: 106 MMOL/L — SIGNIFICANT CHANGE UP (ref 96–108)
CO2 SERPL-SCNC: 21 MMOL/L — LOW (ref 22–31)
CREAT SERPL-MCNC: 0.86 MG/DL — SIGNIFICANT CHANGE UP (ref 0.5–1.3)
EGFR: 84 ML/MIN/1.73M2 — SIGNIFICANT CHANGE UP
EOSINOPHIL # BLD AUTO: 0.18 K/UL — SIGNIFICANT CHANGE UP (ref 0–0.5)
EOSINOPHIL NFR BLD AUTO: 2.6 % — SIGNIFICANT CHANGE UP (ref 0–6)
GLUCOSE SERPL-MCNC: 78 MG/DL — SIGNIFICANT CHANGE UP (ref 70–99)
HCG SERPL-ACNC: <2 MIU/ML — SIGNIFICANT CHANGE UP
HCT VFR BLD CALC: 35.4 % — SIGNIFICANT CHANGE UP (ref 34.5–45)
HGB BLD-MCNC: 11.3 G/DL — LOW (ref 11.5–15.5)
LYMPHOCYTES # BLD AUTO: 1.98 K/UL — SIGNIFICANT CHANGE UP (ref 1–3.3)
LYMPHOCYTES # BLD AUTO: 28.1 % — SIGNIFICANT CHANGE UP (ref 13–44)
MANUAL SMEAR VERIFICATION: SIGNIFICANT CHANGE UP
MCHC RBC-ENTMCNC: 22.7 PG — LOW (ref 27–34)
MCHC RBC-ENTMCNC: 31.9 GM/DL — LOW (ref 32–36)
MCV RBC AUTO: 71.1 FL — LOW (ref 80–100)
MICROCYTES BLD QL: SLIGHT — SIGNIFICANT CHANGE UP
MONOCYTES # BLD AUTO: 0.43 K/UL — SIGNIFICANT CHANGE UP (ref 0–0.9)
MONOCYTES NFR BLD AUTO: 6.1 % — SIGNIFICANT CHANGE UP (ref 2–14)
NEUTROPHILS # BLD AUTO: 4.46 K/UL — SIGNIFICANT CHANGE UP (ref 1.8–7.4)
NEUTROPHILS NFR BLD AUTO: 62.3 % — SIGNIFICANT CHANGE UP (ref 43–77)
NEUTS BAND # BLD: 0.9 % — SIGNIFICANT CHANGE UP (ref 0–8)
PLAT MORPH BLD: NORMAL — SIGNIFICANT CHANGE UP
PLATELET # BLD AUTO: 281 K/UL — SIGNIFICANT CHANGE UP (ref 150–400)
POTASSIUM SERPL-MCNC: 3.8 MMOL/L — SIGNIFICANT CHANGE UP (ref 3.5–5.3)
POTASSIUM SERPL-SCNC: 3.8 MMOL/L — SIGNIFICANT CHANGE UP (ref 3.5–5.3)
PROT SERPL-MCNC: 7.8 G/DL — SIGNIFICANT CHANGE UP (ref 6–8.3)
RBC # BLD: 4.98 M/UL — SIGNIFICANT CHANGE UP (ref 3.8–5.2)
RBC # FLD: 15.4 % — HIGH (ref 10.3–14.5)
RBC BLD AUTO: SIGNIFICANT CHANGE UP
SODIUM SERPL-SCNC: 138 MMOL/L — SIGNIFICANT CHANGE UP (ref 135–145)
WBC # BLD: 7.06 K/UL — SIGNIFICANT CHANGE UP (ref 3.8–10.5)
WBC # FLD AUTO: 7.06 K/UL — SIGNIFICANT CHANGE UP (ref 3.8–10.5)

## 2023-06-01 PROCEDURE — 99284 EMERGENCY DEPT VISIT MOD MDM: CPT

## 2023-06-01 PROCEDURE — 71045 X-RAY EXAM CHEST 1 VIEW: CPT | Mod: 26

## 2023-06-01 RX ORDER — SODIUM CHLORIDE 9 MG/ML
250 INJECTION INTRAMUSCULAR; INTRAVENOUS; SUBCUTANEOUS ONCE
Refills: 0 | Status: DISCONTINUED | OUTPATIENT
Start: 2023-06-01 | End: 2023-06-01

## 2023-06-01 RX ORDER — ACETAMINOPHEN 500 MG
975 TABLET ORAL ONCE
Refills: 0 | Status: COMPLETED | OUTPATIENT
Start: 2023-06-01 | End: 2023-06-01

## 2023-06-01 RX ADMIN — Medication 975 MILLIGRAM(S): at 21:32
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
## 2023-06-01 NOTE — ED ADULT NURSE NOTE - NSFALLUNIVINTERV_ED_ALL_ED
Bed/Stretcher in lowest position, wheels locked, appropriate side rails in place/Call bell, personal items and telephone in reach/Instruct patient to call for assistance before getting out of bed/chair/stretcher/Non-slip footwear applied when patient is off stretcher/[place] to call system/Physically safe environment - no spills, clutter or unnecessary equipment/Purposeful proactive rounding/Room/bathroom lighting operational, light cord in reach

## 2023-06-01 NOTE — ED PROVIDER NOTE - PHYSICAL EXAMINATION
GEN: Pt in NAD, A&O x3. GCS 15  EYES: Sclera white w/o injection.  ENT: Head NCAT. Neck supple FROM.   RESP: No chest wall tenderness, CTA b/l, no wheezes, rales, or rhonchi.   CARDIAC: RRR, clear distinct S1, S2, no murmurs, gallops, or rubs.   ABD: TTP suprapubic region. Abdomen non-distended, soft, non-tender, no rebound or guarding.   VASC: 2+ radial pulses b/l. No edema or tenderness of the lower extremities.  NEURO: Normal and equal sensation UE, LE. 5/5 strength UE and LE b/l.   MSK: TTP midline neck and mid thoracic spine. No joint erythema or obvious deformities. GEN: Pt in NAD, A&O x3. GCS 15  EYES: Sclera white w/o injection.  ENT: Head NCAT. Neck supple FROM w/ midline TTP   RESP: No chest wall tenderness, CTA b/l, no wheezes, rales, or rhonchi.   CARDIAC: RRR, clear distinct S1, S2, no murmurs, gallops, or rubs.   ABD: TTP suprapubic region. Abdomen non-distended, soft, non-tender, no rebound or guarding.   VASC: 2+ radial pulses b/l. No edema or tenderness of the lower extremities.  NEURO: Normal and equal sensation UE, LE. 5/5 strength UE and LE b/l.   MSK: TTP above sternum and mid thoracic spine. No joint erythema or obvious deformities.
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
## 2023-06-01 NOTE — ED PROVIDER NOTE - PATIENT PORTAL LINK FT
You can access the FollowMyHealth Patient Portal offered by [facility] by registering at the following website: [email or web address]. By joining [employer]’s FollowMyHealth portal, you will also be able to view your health information using other applications (apps) compatible with our system.

## 2023-06-01 NOTE — ED PROVIDER NOTE - PROGRESS NOTE DETAILS
Patient placed in C-Collar d/t endorsed midline cervical tenderness.  Faye Ledezma PA-C Attending (Tapan Torrez D.O.):  Patient signed out to me, hemodynam stable, abcs intact, gcs 15. Patient awake, alert, oriented to person, place, and time, not intoxicated, no motor or sensory deficits, no distracting injury, no midline spinal tenderness, and able to flex, extend, rotate head without any restriction. Patient able to ambulate without difficulty. C-collar removed. -> CTs nonactionable. UA with rbcs and blood. Benign abd. Patient menstruating. Will not treat based soley on nitrites given no urianry sxs. f/u culture. Patient is hemodynamically stable, feels improved. All w/u, results discussed at length w/ patient. All questions answered. Strict return precautions provided w/ verbal understanding expressed. Stable for dc w/ close outpt f/u.

## 2023-06-01 NOTE — ED PROVIDER NOTE - NSFOLLOWUPINSTRUCTIONS_ED_ALL_ED_FT
- Please follow up with your Primary Care Doctor in 2-3 days, bring a copy of your results to follow up appointment.     - Please follow up with  after discharge for reevaluation and continued management. We have reached out to our care coordinators to help schedule appointment and you should expect a call in the next 24-48 hours to expedite appointment. You may also call and schedule your own appointment:       *    - Please rest, stay hydrated and continue all at home medications as previously prescribed.    - For Pain  Tylenol (acetaminophen) 1000mg every 6-8 hours as needed and/or over the counter Motrin (Ibuprofen/Advil) 400-600mg every 6 hours as needed, take with food.     - Return to ED for any concern listed below:  Fever, weakness, numbness, bowel/urine incontinence or retention, increased pain, inability to walk or any new or worsened symptoms
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
## 2023-06-02 VITALS
OXYGEN SATURATION: 100 % | TEMPERATURE: 98 F | RESPIRATION RATE: 18 BRPM | HEART RATE: 70 BPM | DIASTOLIC BLOOD PRESSURE: 81 MMHG | SYSTOLIC BLOOD PRESSURE: 127 MMHG

## 2023-06-02 LAB
APPEARANCE UR: CLEAR — SIGNIFICANT CHANGE UP
BACTERIA # UR AUTO: ABNORMAL
BILIRUB UR-MCNC: NEGATIVE — SIGNIFICANT CHANGE UP
COLOR SPEC: ABNORMAL
DIFF PNL FLD: ABNORMAL
EPI CELLS # UR: 1 /HPF — SIGNIFICANT CHANGE UP
GLUCOSE UR QL: NEGATIVE — SIGNIFICANT CHANGE UP
HYALINE CASTS # UR AUTO: 2 /LPF — SIGNIFICANT CHANGE UP (ref 0–2)
KETONES UR-MCNC: NEGATIVE — SIGNIFICANT CHANGE UP
LEUKOCYTE ESTERASE UR-ACNC: NEGATIVE — SIGNIFICANT CHANGE UP
NITRITE UR-MCNC: POSITIVE
PH UR: 6.5 — SIGNIFICANT CHANGE UP (ref 5–8)
PROT UR-MCNC: ABNORMAL
RBC CASTS # UR COMP ASSIST: 1311 /HPF — HIGH (ref 0–4)
SP GR SPEC: >1.05 (ref 1.01–1.02)
UROBILINOGEN FLD QL: NEGATIVE — SIGNIFICANT CHANGE UP
WBC UR QL: 12 /HPF — HIGH (ref 0–5)

## 2023-06-02 PROCEDURE — 85025 COMPLETE CBC W/AUTO DIFF WBC: CPT

## 2023-06-02 PROCEDURE — 99284 EMERGENCY DEPT VISIT MOD MDM: CPT | Mod: 25

## 2023-06-02 PROCEDURE — 87186 SC STD MICRODIL/AGAR DIL: CPT

## 2023-06-02 PROCEDURE — 81001 URINALYSIS AUTO W/SCOPE: CPT

## 2023-06-02 PROCEDURE — 71260 CT THORAX DX C+: CPT | Mod: 26,MA

## 2023-06-02 PROCEDURE — 72125 CT NECK SPINE W/O DYE: CPT | Mod: MC

## 2023-06-02 PROCEDURE — 80053 COMPREHEN METABOLIC PANEL: CPT

## 2023-06-02 PROCEDURE — 71045 X-RAY EXAM CHEST 1 VIEW: CPT

## 2023-06-02 PROCEDURE — 70450 CT HEAD/BRAIN W/O DYE: CPT | Mod: 26,MC

## 2023-06-02 PROCEDURE — 74177 CT ABD & PELVIS W/CONTRAST: CPT | Mod: MA

## 2023-06-02 PROCEDURE — 87086 URINE CULTURE/COLONY COUNT: CPT

## 2023-06-02 PROCEDURE — 84702 CHORIONIC GONADOTROPIN TEST: CPT

## 2023-06-02 PROCEDURE — 72132 CT LUMBAR SPINE W/DYE: CPT | Mod: 26,MA

## 2023-06-02 PROCEDURE — 71260 CT THORAX DX C+: CPT | Mod: MA

## 2023-06-02 PROCEDURE — 74177 CT ABD & PELVIS W/CONTRAST: CPT | Mod: 26,MA

## 2023-06-02 PROCEDURE — 72125 CT NECK SPINE W/O DYE: CPT | Mod: 26,MC

## 2023-06-02 PROCEDURE — 72129 CT CHEST SPINE W/DYE: CPT | Mod: 26,MC

## 2023-06-02 PROCEDURE — 70450 CT HEAD/BRAIN W/O DYE: CPT | Mod: MC

## 2023-06-02 PROCEDURE — 36415 COLL VENOUS BLD VENIPUNCTURE: CPT

## 2023-06-03 NOTE — ED POST DISCHARGE NOTE - DETAILS
Spoke with patient and made her aware of all findings.  Patient to follow up with her PMD for further imaging as needed.  Urine culture pending.  Patient made aware that we will contact her if culture is abnormal.  -Aris Ash PA-C Ucx > 100K Ecoli sens macrobid. Spoke with pt who is endorsing urinary frequency and dysuria, will treat with macrobid, confirmed NKDA and pharmacy, will f.u with pcp all questions answered. -Zamzam Ross PA-C

## 2023-06-05 RX ORDER — NITROFURANTOIN MACROCRYSTAL 50 MG
1 CAPSULE ORAL
Qty: 10 | Refills: 0
Start: 2023-06-05 | End: 2023-06-09

## 2023-12-22 NOTE — ED ADULT NURSE NOTE - NSFALLRSKASSESSTYPE_ED_ALL_ED
12/22/23 0600   Sleep Analysis   Hours Slept 6     Assumed care of pt from [id number].  Pt was irritable due to not receiving certain snacks and amount of snacks that he requested during snack hours.  Pt educated on diabetic diet.  Pt stated \"You're starving me! I'm going to report all of you!\"  Pt needs reinforcement of education regarding diabetic diet.  Will continue to monitor.    Initial (On Arrival)

## 2024-04-17 ENCOUNTER — LABORATORY RESULT (OUTPATIENT)
Age: 49
End: 2024-04-17

## 2024-04-18 ENCOUNTER — APPOINTMENT (OUTPATIENT)
Dept: OBGYN | Facility: CLINIC | Age: 49
End: 2024-04-18
Payer: MEDICAID

## 2024-04-18 ENCOUNTER — OUTPATIENT (OUTPATIENT)
Dept: OUTPATIENT SERVICES | Facility: HOSPITAL | Age: 49
LOS: 1 days | End: 2024-04-18
Payer: MEDICAID

## 2024-04-18 ENCOUNTER — RESULT CHARGE (OUTPATIENT)
Age: 49
End: 2024-04-18

## 2024-04-18 VITALS — DIASTOLIC BLOOD PRESSURE: 80 MMHG | WEIGHT: 139 LBS | BODY MASS INDEX: 23.13 KG/M2 | SYSTOLIC BLOOD PRESSURE: 122 MMHG

## 2024-04-18 DIAGNOSIS — N76.0 ACUTE VAGINITIS: ICD-10-CM

## 2024-04-18 DIAGNOSIS — Z98.890 OTHER SPECIFIED POSTPROCEDURAL STATES: Chronic | ICD-10-CM

## 2024-04-18 DIAGNOSIS — Z01.419 ENCOUNTER FOR GYNECOLOGICAL EXAMINATION (GENERAL) (ROUTINE) W/OUT ABNORMAL FINDINGS: ICD-10-CM

## 2024-04-18 DIAGNOSIS — Z00.00 ENCOUNTER FOR GENERAL ADULT MEDICAL EXAMINATION W/OUT ABNORMAL FINDINGS: ICD-10-CM

## 2024-04-18 PROCEDURE — 87077 CULTURE AEROBIC IDENTIFY: CPT

## 2024-04-18 PROCEDURE — 99396 PREV VISIT EST AGE 40-64: CPT | Mod: GC

## 2024-04-18 PROCEDURE — 81002 URINALYSIS NONAUTO W/O SCOPE: CPT

## 2024-04-18 PROCEDURE — G0463: CPT

## 2024-04-18 PROCEDURE — 87086 URINE CULTURE/COLONY COUNT: CPT

## 2024-04-18 PROCEDURE — 87624 HPV HI-RISK TYP POOLED RSLT: CPT

## 2024-04-18 PROCEDURE — 87186 SC STD MICRODIL/AGAR DIL: CPT

## 2024-04-18 NOTE — PHYSICAL EXAM
[Appropriately responsive] : appropriately responsive [Oriented x3] : oriented x3 [Enlargement Of The Right Breast] : swelling [Enlargement Of The Left Breast] : swelling [No Masses] : no breast masses were palpable [Labia Majora] : normal [Labia Minora] : normal [Normal] : normal [Anteversion] : anteverted [Uterine Adnexae] : non-palpable

## 2024-04-19 ENCOUNTER — LABORATORY RESULT (OUTPATIENT)
Age: 49
End: 2024-04-19

## 2024-04-19 DIAGNOSIS — R39.9 UNSPECIFIED SYMPTOMS AND SIGNS INVOLVING THE GENITOURINARY SYSTEM: ICD-10-CM

## 2024-04-19 LAB — HPV HIGH+LOW RISK DNA PNL CVX: SIGNIFICANT CHANGE UP

## 2024-04-21 LAB
BILIRUB UR QL STRIP: NORMAL
GLUCOSE UR-MCNC: NORMAL
HCG UR QL: 0.2 EU/DL
HGB UR QL STRIP.AUTO: NORMAL
KETONES UR-MCNC: NORMAL
LEUKOCYTE ESTERASE UR QL STRIP: NORMAL
NITRITE UR QL STRIP: NORMAL
PH UR STRIP: 5.5
PROT UR STRIP-MCNC: NORMAL
SP GR UR STRIP: 1.02

## 2024-04-22 PROBLEM — R39.9 UTI SYMPTOMS: Status: ACTIVE | Noted: 2024-04-22

## 2024-04-22 PROBLEM — Z01.419 WELL WOMAN EXAM WITH ROUTINE GYNECOLOGICAL EXAM: Status: ACTIVE | Noted: 2024-04-22

## 2024-04-22 LAB
-  AMPICILLIN: SIGNIFICANT CHANGE UP
-  CIPROFLOXACIN: SIGNIFICANT CHANGE UP
-  LEVOFLOXACIN: SIGNIFICANT CHANGE UP
-  NITROFURANTOIN: SIGNIFICANT CHANGE UP
-  TETRACYCLINE: SIGNIFICANT CHANGE UP
-  VANCOMYCIN: SIGNIFICANT CHANGE UP
CULTURE RESULTS: ABNORMAL
CYTOLOGY SPEC DOC CYTO: SIGNIFICANT CHANGE UP
METHOD TYPE: SIGNIFICANT CHANGE UP
ORGANISM # SPEC MICROSCOPIC CNT: ABNORMAL
ORGANISM # SPEC MICROSCOPIC CNT: ABNORMAL
SPECIMEN SOURCE: SIGNIFICANT CHANGE UP

## 2024-04-22 NOTE — PLAN
[FreeTextEntry1] : 49y P3 presenting for annual visit reporting vaginal bump.  #Inclusion cyst - Not seen or palpated on exam - Pt counseled against douching, educated on using water +/- gentle soap on vulva with no soap or water internally - Bump likely inclusion cyst, pt to return if incr in size or pain  #Health maintenance Pap collected today Mammogram performed recently Pt encouraged to follow up with PCP to schedule colonoscopy  D/w Dr. Reji Sewell PGY1

## 2024-04-22 NOTE — HISTORY OF PRESENT ILLNESS
[FreeTextEntry1] : 49y  sp CSx3 LMP end of March presenting for annual visit. She reports feeling a small bump inside her vagina after douching. Bump does not express any fluid, is not erythematous, painful, itchy, or irritating.   OBHx LTCSx3 GYN Hx Denies PMHx: Denies PSHx:  x3 FHx: Denies family h/o GYN or breast cancer Meds: Denies HCM: Last Pap:  NILM Last Mammogram: 2024 Last Colonoscopy: Reports 8-9 y ago

## 2024-04-23 DIAGNOSIS — Z01.419 ENCOUNTER FOR GYNECOLOGICAL EXAMINATION (GENERAL) (ROUTINE) WITHOUT ABNORMAL FINDINGS: ICD-10-CM

## 2024-04-29 DIAGNOSIS — Z00.00 ENCOUNTER FOR GENERAL ADULT MEDICAL EXAMINATION WITHOUT ABNORMAL FINDINGS: ICD-10-CM

## 2024-05-07 NOTE — ED ADULT NURSE NOTE - HAVE YOU HAD A FIRST COVID-19 BOOSTER?
No
Detail Level: Simple
Additional Notes: 10.8 grams out of 15.5 grams cumulative
Render Risk Assessment In Note?: no

## 2024-05-24 ENCOUNTER — APPOINTMENT (OUTPATIENT)
Dept: OBGYN | Facility: CLINIC | Age: 49
End: 2024-05-24
Payer: SELF-PAY

## 2024-05-24 ENCOUNTER — OUTPATIENT (OUTPATIENT)
Dept: OUTPATIENT SERVICES | Facility: HOSPITAL | Age: 49
LOS: 1 days | End: 2024-05-24
Payer: MEDICAID

## 2024-05-24 VITALS — DIASTOLIC BLOOD PRESSURE: 74 MMHG | SYSTOLIC BLOOD PRESSURE: 114 MMHG | WEIGHT: 140 LBS | BODY MASS INDEX: 23.3 KG/M2

## 2024-05-24 DIAGNOSIS — Z98.890 OTHER SPECIFIED POSTPROCEDURAL STATES: Chronic | ICD-10-CM

## 2024-05-24 DIAGNOSIS — N92.0 EXCESSIVE AND FREQUENT MENSTRUATION WITH REGULAR CYCLE: ICD-10-CM

## 2024-05-24 LAB
HCG UR QL: NEGATIVE
QUALITY CONTROL: YES

## 2024-05-24 PROCEDURE — 81025 URINE PREGNANCY TEST: CPT

## 2024-05-24 PROCEDURE — 58100 BIOPSY OF UTERUS LINING: CPT

## 2024-05-24 RX ORDER — NITROFURANTOIN (MONOHYDRATE/MACROCRYSTALS) 25; 75 MG/1; MG/1
100 CAPSULE ORAL
Qty: 10 | Refills: 0 | Status: COMPLETED | COMMUNITY
Start: 2024-04-22 | End: 2024-05-24

## 2024-05-24 NOTE — HISTORY OF PRESENT ILLNESS
[Prolonged Menses] : prolonged menses [Heavy Bleeding] : described as heavy in severity [Pain] : no pelvic pain [Dizziness] : no dizziness [Palpitations] : no palpitations [Pregnancy] : no pregnancy [Definite:  ___ (Date)] : the last menstrual period was [unfilled] [Regular Cycle Intervals] : periods have been regular [Menstrual Cramps] : no menstrual cramps [Sexually Active] : is sexually active

## 2024-05-24 NOTE — PHYSICAL EXAM
[Normal] : uterus [Labia Majora] : labia major [Labia Minora] : labia minora [No Bleeding] : there was no active vaginal bleeding [Discharge] : had a ~M discharge [Heavy] : heavy [Bloody] : bloody [Uterine Adnexae] : were not tender and not enlarged

## 2024-05-29 DIAGNOSIS — N76.0 ACUTE VAGINITIS: ICD-10-CM

## 2024-06-01 ENCOUNTER — INPATIENT (INPATIENT)
Facility: HOSPITAL | Age: 49
LOS: 5 days | Discharge: HOME | DRG: 871 | End: 2024-06-07
Attending: INTERNAL MEDICINE | Admitting: INTERNAL MEDICINE
Payer: MEDICAID

## 2024-06-01 VITALS
SYSTOLIC BLOOD PRESSURE: 105 MMHG | WEIGHT: 138.89 LBS | TEMPERATURE: 99 F | RESPIRATION RATE: 19 BRPM | DIASTOLIC BLOOD PRESSURE: 72 MMHG | OXYGEN SATURATION: 98 % | HEART RATE: 112 BPM

## 2024-06-01 DIAGNOSIS — Z98.890 OTHER SPECIFIED POSTPROCEDURAL STATES: Chronic | ICD-10-CM

## 2024-06-01 LAB
ALBUMIN SERPL ELPH-MCNC: 4 G/DL — SIGNIFICANT CHANGE UP (ref 3.3–5)
ALP SERPL-CCNC: 53 U/L — SIGNIFICANT CHANGE UP (ref 40–120)
ALT FLD-CCNC: 16 U/L — SIGNIFICANT CHANGE UP (ref 10–45)
ANION GAP SERPL CALC-SCNC: 14 MMOL/L — SIGNIFICANT CHANGE UP (ref 5–17)
APPEARANCE UR: CLEAR — SIGNIFICANT CHANGE UP
AST SERPL-CCNC: 18 U/L — SIGNIFICANT CHANGE UP (ref 10–40)
BACTERIA # UR AUTO: NEGATIVE /HPF — SIGNIFICANT CHANGE UP
BILIRUB SERPL-MCNC: 0.4 MG/DL — SIGNIFICANT CHANGE UP (ref 0.2–1.2)
BILIRUB UR-MCNC: NEGATIVE — SIGNIFICANT CHANGE UP
BUN SERPL-MCNC: 10 MG/DL — SIGNIFICANT CHANGE UP (ref 7–23)
CALCIUM SERPL-MCNC: 9.1 MG/DL — SIGNIFICANT CHANGE UP (ref 8.4–10.5)
CAST: 0 /LPF — SIGNIFICANT CHANGE UP (ref 0–4)
CHLORIDE SERPL-SCNC: 103 MMOL/L — SIGNIFICANT CHANGE UP (ref 96–108)
CO2 SERPL-SCNC: 22 MMOL/L — SIGNIFICANT CHANGE UP (ref 22–31)
COLOR SPEC: YELLOW — SIGNIFICANT CHANGE UP
CREAT SERPL-MCNC: 1.03 MG/DL — SIGNIFICANT CHANGE UP (ref 0.5–1.3)
DIFF PNL FLD: ABNORMAL
EGFR: 67 ML/MIN/1.73M2 — SIGNIFICANT CHANGE UP
GLUCOSE SERPL-MCNC: 130 MG/DL — HIGH (ref 70–99)
GLUCOSE UR QL: NEGATIVE MG/DL — SIGNIFICANT CHANGE UP
HCT VFR BLD CALC: 30.2 % — LOW (ref 34.5–45)
HGB BLD-MCNC: 9.8 G/DL — LOW (ref 11.5–15.5)
KETONES UR-MCNC: NEGATIVE MG/DL — SIGNIFICANT CHANGE UP
LEUKOCYTE ESTERASE UR-ACNC: ABNORMAL
LIDOCAIN IGE QN: 23 U/L — SIGNIFICANT CHANGE UP (ref 7–60)
MAGNESIUM SERPL-MCNC: 2.2 MG/DL — SIGNIFICANT CHANGE UP (ref 1.6–2.6)
MCHC RBC-ENTMCNC: 23.4 PG — LOW (ref 27–34)
MCHC RBC-ENTMCNC: 32.5 GM/DL — SIGNIFICANT CHANGE UP (ref 32–36)
MCV RBC AUTO: 72.2 FL — LOW (ref 80–100)
NITRITE UR-MCNC: NEGATIVE — SIGNIFICANT CHANGE UP
PH UR: 6 — SIGNIFICANT CHANGE UP (ref 5–8)
PLATELET # BLD AUTO: 256 K/UL — SIGNIFICANT CHANGE UP (ref 150–400)
POTASSIUM SERPL-MCNC: 3.8 MMOL/L — SIGNIFICANT CHANGE UP (ref 3.5–5.3)
POTASSIUM SERPL-SCNC: 3.8 MMOL/L — SIGNIFICANT CHANGE UP (ref 3.5–5.3)
PROCALCITONIN SERPL-MCNC: 1.27 NG/ML — HIGH (ref 0.02–0.1)
PROT SERPL-MCNC: 7.8 G/DL — SIGNIFICANT CHANGE UP (ref 6–8.3)
PROT UR-MCNC: 30 MG/DL
RBC # BLD: 4.18 M/UL — SIGNIFICANT CHANGE UP (ref 3.8–5.2)
RBC # BLD: 4.18 M/UL — SIGNIFICANT CHANGE UP (ref 3.8–5.2)
RBC # FLD: 17.2 % — HIGH (ref 10.3–14.5)
RBC CASTS # UR COMP ASSIST: 1 /HPF — SIGNIFICANT CHANGE UP (ref 0–4)
RETICS #: 84 K/UL — SIGNIFICANT CHANGE UP (ref 25–125)
RETICS/RBC NFR: 2 % — SIGNIFICANT CHANGE UP (ref 0.5–2.5)
SODIUM SERPL-SCNC: 139 MMOL/L — SIGNIFICANT CHANGE UP (ref 135–145)
SP GR SPEC: 1.01 — SIGNIFICANT CHANGE UP (ref 1–1.03)
SQUAMOUS # UR AUTO: 0 /HPF — SIGNIFICANT CHANGE UP (ref 0–5)
UROBILINOGEN FLD QL: 0.2 MG/DL — SIGNIFICANT CHANGE UP (ref 0.2–1)
WBC # BLD: 13.18 K/UL — HIGH (ref 3.8–10.5)
WBC # FLD AUTO: 13.18 K/UL — HIGH (ref 3.8–10.5)
WBC UR QL: 130 /HPF — HIGH (ref 0–5)

## 2024-06-01 PROCEDURE — 99285 EMERGENCY DEPT VISIT HI MDM: CPT

## 2024-06-01 PROCEDURE — 74177 CT ABD & PELVIS W/CONTRAST: CPT | Mod: 26,MC

## 2024-06-01 PROCEDURE — 71046 X-RAY EXAM CHEST 2 VIEWS: CPT | Mod: 26

## 2024-06-01 RX ORDER — SODIUM CHLORIDE 9 MG/ML
1000 INJECTION, SOLUTION INTRAVENOUS
Refills: 0 | Status: DISCONTINUED | OUTPATIENT
Start: 2024-06-01 | End: 2024-06-02

## 2024-06-01 RX ORDER — ACETAMINOPHEN 500 MG
1000 TABLET ORAL ONCE
Refills: 0 | Status: COMPLETED | OUTPATIENT
Start: 2024-06-01 | End: 2024-06-01

## 2024-06-01 RX ORDER — SODIUM CHLORIDE 9 MG/ML
1000 INJECTION, SOLUTION INTRAVENOUS ONCE
Refills: 0 | Status: COMPLETED | OUTPATIENT
Start: 2024-06-01 | End: 2024-06-01

## 2024-06-01 RX ORDER — ONDANSETRON 8 MG/1
4 TABLET, FILM COATED ORAL ONCE
Refills: 0 | Status: COMPLETED | OUTPATIENT
Start: 2024-06-01 | End: 2024-06-01

## 2024-06-01 RX ADMIN — Medication 1000 MILLIGRAM(S): at 23:05

## 2024-06-01 RX ADMIN — SODIUM CHLORIDE 1000 MILLILITER(S): 9 INJECTION, SOLUTION INTRAVENOUS at 23:48

## 2024-06-01 RX ADMIN — Medication 400 MILLIGRAM(S): at 22:47

## 2024-06-01 RX ADMIN — SODIUM CHLORIDE 2000 MILLILITER(S): 9 INJECTION, SOLUTION INTRAVENOUS at 22:48

## 2024-06-01 NOTE — ED ADULT TRIAGE NOTE - CHIEF COMPLAINT QUOTE
vaginal bleeding x 13 days.   Endorses lower abd pain, weakness, fevers.   Pending OB appt on the June 10th for US.

## 2024-06-01 NOTE — ED ADULT NURSE NOTE - NS ED NOTE  TALK SOMEONE YN
You are being treated for a viral upper respiratory infection  Drink plenty of fluids and be sure to wash your hands often  Motrin or tylenol for any throat pain      Follow up with pediatrician if fevers develop or symptoms last longer than one week No

## 2024-06-01 NOTE — ED ADULT NURSE NOTE - OBJECTIVE STATEMENT
The patient is a 49y female presenting to the ED from home for complaints of vaginal bleeding. Patient presents with a PMH of GERD, chronic constipation, and recurrent UTI infections and notes for the last month shes had heavy bleeding. Patient endorsed she no longer gets a menstrual period and was recently seen by her outpatient OB/GYN for the bleeding, suprapubic pain, biopsy procedure, and pending ultrasound 6/10. Patient also states over the last few days shes felt like shes had another UTI endorsing fever, painful urination causing her to follow up with her outpatient MD today who told her to come here. Patient endorsed vaginal bleed and urine symptoms also present with both headache & back pain. Pt denies chest pain, palpitations, shortness of breath, visual disturbances, numbness/tingling, diaphoresis,  nausea, vomiting, constipation, or diarrhea. Safety and comfort measures provided, bed locked and in lowest position, side rails up for safety. Call bell within reach. Awaiting results.

## 2024-06-01 NOTE — ED PROVIDER NOTE - CCCP TRG CHIEF CMPLNT
· Rate controlled, C/w home toprol and diltiazem  · C w eliquis for anticoagulation vaginal bleeding

## 2024-06-01 NOTE — ED PROVIDER NOTE - PROGRESS NOTE DETAILS
Familia Collier DO: concern for hydrosalpinx on imaging, thickened endometrium. OBGYN consulted and will see pt. Steve Martin PA-C: patient signed out to me by previous team. imaging showing b/l pyelo, thickened endometrium. seen by obgyn. recommend sending GC test. will start patient on cefpodoxime and doxy. will place patient in CDU for further eval and management. discussed with ED attending.

## 2024-06-01 NOTE — ED PROVIDER NOTE - ATTENDING CONTRIBUTION TO CARE
------------ATTENDING NOTE------------  pt w/ daughter c/o 5 days of increased urinary frequency, burning dysuria, suprapubic cramps w/ occasional R flank/R abdominal pains, nausea, slight anorexia, no fevers, recent dysfunctional uterine bleeding but no recent bleeding for weeks, started on levofloxacin today for presumed UTI, awaiting labs/imaging and close reassessments -->  - Randy Rousseau MD   ----------------------------------------------- ------------ATTENDING NOTE------------  pt w/ daughter c/o 5 days of increased urinary frequency, burning dysuria, suprapubic cramps w/ occasional R flank/R abdominal pains, nausea, slight anorexia, no fevers, recent dysfunctional uterine bleeding but no recent bleeding for weeks, started on levofloxacin today for presumed UTI, awaiting labs/imaging and close reassessments --> ED sign out 2300 pending labs / imaging and close reassessments for tx /disposision decisions.  - Randy Rousseau MD   -----------------------------------------------

## 2024-06-01 NOTE — ED PROVIDER NOTE - OBJECTIVE STATEMENT
Patient is a 49y female with pmhx of gerd who presents to the ED with complaints of abdominal pain + dysuria. Contrary to triage note- patient vaginal bleeding from menstrual period. Patient complaining of fever + chills over the last 48-72 hours. Patient states the abdominal pain is non radiating and diffuse in nature. Patient also complaining of dysuria. Patient states she was recently started on levoflaxacin (only took one dose) however the pain acutely worsened and she presented to the ED.

## 2024-06-01 NOTE — ED PROVIDER NOTE - PHYSICAL EXAMINATION
Physical Exam:  Gen: NAD, AOx3, non-toxic appearing, able to ambulate without assistance  Head: NCAT  HEENT: EOMI, PEERLA, normal conjunctiva, tongue midline, oral mucosa moist  Lung: CTAB, no respiratory distress, no wheezes/rhonchi/rales B/L, speaking in full sentences  CV: RRR  Abd: diffuse abdominal tenderness    MSK: no visible deformities, ROM normal in UE/LE, no back pain  Neuro: No focal sensory or motor deficits  Skin: Warm, well perfused, no rash, no leg swelling

## 2024-06-02 LAB
ANISOCYTOSIS BLD QL: SLIGHT — SIGNIFICANT CHANGE UP
BASOPHILS # BLD AUTO: 0 K/UL — SIGNIFICANT CHANGE UP (ref 0–0.2)
BASOPHILS NFR BLD AUTO: 0 % — SIGNIFICANT CHANGE UP (ref 0–2)
EOSINOPHIL # BLD AUTO: 0 K/UL — SIGNIFICANT CHANGE UP (ref 0–0.5)
EOSINOPHIL NFR BLD AUTO: 0 % — SIGNIFICANT CHANGE UP (ref 0–6)
GIANT PLATELETS BLD QL SMEAR: PRESENT — SIGNIFICANT CHANGE UP
LYMPHOCYTES # BLD AUTO: 15.8 % — SIGNIFICANT CHANGE UP (ref 13–44)
LYMPHOCYTES # BLD AUTO: 2.08 K/UL — SIGNIFICANT CHANGE UP (ref 1–3.3)
MANUAL SMEAR VERIFICATION: SIGNIFICANT CHANGE UP
MICROCYTES BLD QL: SLIGHT — SIGNIFICANT CHANGE UP
MONOCYTES # BLD AUTO: 0.7 K/UL — SIGNIFICANT CHANGE UP (ref 0–0.9)
MONOCYTES NFR BLD AUTO: 5.3 % — SIGNIFICANT CHANGE UP (ref 2–14)
NEUTROPHILS # BLD AUTO: 10.4 K/UL — HIGH (ref 1.8–7.4)
NEUTROPHILS NFR BLD AUTO: 78.9 % — HIGH (ref 43–77)
PLAT MORPH BLD: NORMAL — SIGNIFICANT CHANGE UP
POIKILOCYTOSIS BLD QL AUTO: SLIGHT — SIGNIFICANT CHANGE UP
RBC BLD AUTO: ABNORMAL
STOMATOCYTES BLD QL SMEAR: SLIGHT — SIGNIFICANT CHANGE UP

## 2024-06-02 PROCEDURE — 99223 1ST HOSP IP/OBS HIGH 75: CPT

## 2024-06-02 PROCEDURE — 76830 TRANSVAGINAL US NON-OB: CPT | Mod: 26

## 2024-06-02 RX ORDER — IBUPROFEN 200 MG
400 TABLET ORAL ONCE
Refills: 0 | Status: COMPLETED | OUTPATIENT
Start: 2024-06-02 | End: 2024-06-02

## 2024-06-02 RX ORDER — SODIUM CHLORIDE 9 MG/ML
1000 INJECTION INTRAMUSCULAR; INTRAVENOUS; SUBCUTANEOUS
Refills: 0 | Status: DISCONTINUED | OUTPATIENT
Start: 2024-06-02 | End: 2024-06-03

## 2024-06-02 RX ORDER — PHENAZOPYRIDINE HCL 100 MG
200 TABLET ORAL EVERY 8 HOURS
Refills: 0 | Status: COMPLETED | OUTPATIENT
Start: 2024-06-02 | End: 2024-06-04

## 2024-06-02 RX ORDER — KETOROLAC TROMETHAMINE 30 MG/ML
15 SYRINGE (ML) INJECTION EVERY 6 HOURS
Refills: 0 | Status: DISCONTINUED | OUTPATIENT
Start: 2024-06-02 | End: 2024-06-04

## 2024-06-02 RX ORDER — SODIUM CHLORIDE 9 MG/ML
1000 INJECTION INTRAMUSCULAR; INTRAVENOUS; SUBCUTANEOUS
Refills: 0 | Status: DISCONTINUED | OUTPATIENT
Start: 2024-06-02 | End: 2024-06-02

## 2024-06-02 RX ORDER — PHENAZOPYRIDINE HCL 100 MG
100 TABLET ORAL EVERY 8 HOURS
Refills: 0 | Status: DISCONTINUED | OUTPATIENT
Start: 2024-06-02 | End: 2024-06-02

## 2024-06-02 RX ORDER — CEFPODOXIME PROXETIL 100 MG
100 TABLET ORAL EVERY 12 HOURS
Refills: 0 | Status: DISCONTINUED | OUTPATIENT
Start: 2024-06-02 | End: 2024-06-02

## 2024-06-02 RX ORDER — CEFTRIAXONE 500 MG/1
1000 INJECTION, POWDER, FOR SOLUTION INTRAMUSCULAR; INTRAVENOUS EVERY 12 HOURS
Refills: 0 | Status: DISCONTINUED | OUTPATIENT
Start: 2024-06-02 | End: 2024-06-03

## 2024-06-02 RX ORDER — ACETAMINOPHEN 500 MG
975 TABLET ORAL EVERY 6 HOURS
Refills: 0 | Status: DISCONTINUED | OUTPATIENT
Start: 2024-06-02 | End: 2024-06-07

## 2024-06-02 RX ADMIN — Medication 400 MILLIGRAM(S): at 02:47

## 2024-06-02 RX ADMIN — Medication 100 MILLIGRAM(S): at 22:37

## 2024-06-02 RX ADMIN — Medication 15 MILLIGRAM(S): at 17:08

## 2024-06-02 RX ADMIN — Medication 100 MILLIGRAM(S): at 09:05

## 2024-06-02 RX ADMIN — SODIUM CHLORIDE 150 MILLILITER(S): 9 INJECTION, SOLUTION INTRAVENOUS at 08:40

## 2024-06-02 RX ADMIN — SODIUM CHLORIDE 150 MILLILITER(S): 9 INJECTION INTRAMUSCULAR; INTRAVENOUS; SUBCUTANEOUS at 22:36

## 2024-06-02 RX ADMIN — Medication 975 MILLIGRAM(S): at 17:08

## 2024-06-02 RX ADMIN — SODIUM CHLORIDE 150 MILLILITER(S): 9 INJECTION INTRAMUSCULAR; INTRAVENOUS; SUBCUTANEOUS at 11:58

## 2024-06-02 RX ADMIN — SODIUM CHLORIDE 150 MILLILITER(S): 9 INJECTION INTRAMUSCULAR; INTRAVENOUS; SUBCUTANEOUS at 19:00

## 2024-06-02 RX ADMIN — Medication 200 MILLIGRAM(S): at 16:17

## 2024-06-02 RX ADMIN — Medication 400 MILLIGRAM(S): at 02:17

## 2024-06-02 RX ADMIN — Medication 1000 MILLIGRAM(S): at 02:00

## 2024-06-02 RX ADMIN — CEFTRIAXONE 100 MILLIGRAM(S): 500 INJECTION, POWDER, FOR SOLUTION INTRAMUSCULAR; INTRAVENOUS at 11:58

## 2024-06-02 RX ADMIN — SODIUM CHLORIDE 150 MILLILITER(S): 9 INJECTION, SOLUTION INTRAVENOUS at 00:56

## 2024-06-02 RX ADMIN — Medication 200 MILLIGRAM(S): at 22:37

## 2024-06-02 RX ADMIN — Medication 975 MILLIGRAM(S): at 11:57

## 2024-06-02 NOTE — ED CDU PROVIDER DISPOSITION NOTE - CLINICAL COURSE
: 49y  LMP  p/w abdominal/back pain x5d and fevers/chills x2-3d in setting of dysuria and hematuria. Pt reports her PCP gave her a prescription for Levofloxacin a few days ago which she has been taking for presumed UTI. Pt reports she has had 3 UTIs in the past and her sx feel similar, reports last UTI was in April. Pt reports abdominal pain radiating to her back that improves with pain medications. Pt reports associated nausea and intermittent dizziness. Pt denies vomiting, chest pain, SOB, syncope, vaginal bleeding, vaginal discharge.    	Of note, pt reports recent heavy, prolonged menses  that lasted for 13d and required pad change q1h with intermittent passage of clots. Pt was seen in OB/GYN Clinic on  for attempted endometrial biopsy, however cervical os was unable to be dilated and sampling was not obtained. Pap/HPV NILM 24. Pt was given 10d of PO Provera and the bleeding stopped. Pt reports her menses have previously been regular, lasting 5d, without heavy vaginal bleeding.GYN consulted due to L adnexal structure on CT A/P c/f possible hydrosalpinx vs pyosalpinx.    Sent to CDU for IV abx : 49y  LMP  p/w abdominal/back pain x5d and fevers/chills x2-3d in setting of dysuria and hematuria. Pt reports her PCP gave her a prescription for Levofloxacin a few days ago which she has been taking for presumed UTI. Pt reports she has had 3 UTIs in the past and her sx feel similar, reports last UTI was in April. Pt reports abdominal pain radiating to her back that improves with pain medications. Pt reports associated nausea and intermittent dizziness. Pt denies vomiting, chest pain, SOB, syncope, vaginal bleeding, vaginal discharge.    	Of note, pt reports recent heavy, prolonged menses  that lasted for 13d and required pad change q1h with intermittent passage of clots. Pt was seen in OB/GYN Clinic on  for attempted endometrial biopsy, however cervical os was unable to be dilated and sampling was not obtained. Pap/HPV NILM 24. Pt was given 10d of PO Provera and the bleeding stopped. Pt reports her menses have previously been regular, lasting 5d, without heavy vaginal bleeding.GYN consulted due to L adnexal structure on CT A/P c/f possible hydrosalpinx vs pyosalpinx.  In cdu, patient treated with abx. had low grade temp and rigors in cdu. reports symptoms still present. will admit patient to medicine for further eval and management.

## 2024-06-02 NOTE — ED CDU PROVIDER INITIAL DAY NOTE - PROGRESS NOTE DETAILS
CDU PROGRESS NOTE SARAHI KUNZ: Received pt at 1900 sign-out. Case/plan reviewed. Pt resting in stretcher in NAD. VSS. Pt reports mild abdominal discomfort and back pain. +L CVA tenderness Abd: Soft, non-distended, +suprapubic tenderness S1 S2 noted, RRR, lungs CTA b/l. Pt declines analgesia now. GYN recs appreciated, will continue IV abx and monitor overnight.

## 2024-06-02 NOTE — CONSULT NOTE ADULT - ATTENDING COMMENTS
Agree with plan above. ABx coverage will treat PID even though low concern. Needs df/u outpatient with [facility] clinic for D&C scheduling to sample endometrium. Rec interval TVUS to assess adnexa.    I have personally seen, examined, and participated in the care of this patient. I have reviewed all pertinent clinical information, including history, physical exam, plan, and the Resident 's note and agree except as noted.    Mac Ponce MD

## 2024-06-02 NOTE — ED CDU PROVIDER INITIAL DAY NOTE - OBJECTIVE STATEMENT
49y  LMP  p/w abdominal/back pain x5d and fevers/chills x2-3d in setting of dysuria and hematuria. Pt reports her PCP gave her a prescription for Levofloxacin a few days ago which she has been taking for presumed UTI. Pt reports she has had 3 UTIs in the past and her sx feel similar, reports last UTI was in April. Pt reports abdominal pain radiating to her back that improves with pain medications. Pt reports associated nausea and intermittent dizziness. Pt denies vomiting, chest pain, SOB, syncope, vaginal bleeding, vaginal discharge.    Of note, pt reports recent heavy, prolonged menses  that lasted for 13d and required pad change q1h with intermittent passage of clots. Pt was seen in OB/GYN Clinic on  for attempted endometrial biopsy, however cervical os was unable to be dilated and sampling was not obtained. Pap/HPV NILM 24. Pt was given 10d of PO Provera and the bleeding stopped. Pt reports her menses have previously been regular, lasting 5d, without heavy vaginal bleeding.GYN consulted due to L adnexal structure on CT A/P c/f possible hydrosalpinx vs pyosalpinx.
Implemented All Fall Risk Interventions:  [place] to call system. Call bell, personal items and telephone within reach. Instruct patient to call for assistance. Room bathroom lighting operational. Non-slip footwear when patient is off stretcher. Physically safe environment: no spills, clutter or unnecessary equipment. Stretcher in lowest position, wheels locked, appropriate side rails in place. Provide visual cue, wrist band, yellow gown, etc. Monitor gait and stability. Monitor for mental status changes and reorient to person, place, and time. Review medications for side effects contributing to fall risk. Reinforce activity limits and safety measures with patient and family.

## 2024-06-02 NOTE — ED CDU PROVIDER INITIAL DAY NOTE - CPE EDP GASTRO NORM
CINDI HALE    [id number]    80y      Male    Patient is a 80y old  Male who presents with a chief complaint of my legs collapsed (12 Apr 2017 22:43)      INTERVAL HPI/OVERNIGHT EVENTS:    SOB and edema are continuing to improve, has on and off pain in the back and right thigh, Denies fever, chills, chest pain, nausea, vomiting.    REVIEW OF SYSTEMS:    CONSTITUTIONAL: No fever, has fatigue  RESPIRATORY: intermittent cough, shortness of breath is improving.   CARDIOVASCULAR: No chest pain, palpitations  GASTROINTESTINAL: No abdominal, No nausea, vomiting, diarrhea is improving.   NEUROLOGICAL: No headaches,  loss of strength.  MISCELLANEOUS: pain in the back and thigh.         Vital Signs Last 24 Hrs  T(C): 36.1, Max: 36.6 (04-21 @ 15:00)  T(F): 97, Max: 97.9 (04-21 @ 15:00)  HR: 78 (64 - 78)  BP: 136/62 (132/54 - 143/65)  BP(mean): --  RR: 18 (16 - 18)  SpO2: 98% (98% - 99%)    PHYSICAL EXAM:    GENERAL: Elderly male looking comfortable sitting in chair.   HEENT: PERRL, +EOMI  NECK: soft, Supple, No JVD,   CHEST/LUNG: decrease air entry b/l; No wheezing  HEART: S1S2+, Regular rate and rhythm; No murmurs  ABDOMEN: Soft, Nontender, Nondistended; Bowel sounds present  EXTREMITIES:  2+ Peripheral Pulses, +ve edema  SKIN: No rashes or lesions  NEURO: AAOX3, no focal deficits, no motor or sensory loss  PSYCH: normal mood      LABS:                        9.8    7.5   )-----------( 256      ( 21 Apr 2017 06:59 )             31.2     04-21    132<L>  |  95<L>  |  45.0<H>  ----------------------------<  94  4.1   |  22.0  |  1.98<H>    Ca    8.8      21 Apr 2017 06:59  Phos  3.0     04-20  Mg     1.8     04-20      PT/INR - ( 21 Apr 2017 06:59 )   PT: 45.7 sec;   INR: 4.04 ratio          I&O's Summary  I & Os for 24h ending 21 Apr 2017 07:00  =============================================  IN: 480 ml / OUT: 300 ml / NET: 180 ml    I & Os for current day (as of 21 Apr 2017 20:43)  =============================================  IN: 360 ml / OUT: 150 ml / NET: 210 ml      MEDICATIONS  (STANDING):  sodium chloride 0.9% lock flush 3milliLiter(s) IV Push every 8 hours  gabapentin 300milliGRAM(s) Oral two times a day  allopurinol 100milliGRAM(s) Oral daily  colchicine 0.6milliGRAM(s) Oral two times a day  pantoprazole    Tablet 40milliGRAM(s) Oral before breakfast  amLODIPine   Tablet 5milliGRAM(s) Oral daily  metoprolol 50milliGRAM(s) Oral two times a day  atorvastatin 10milliGRAM(s) Oral at bedtime  ALBUTerol    0.083% 2.5milliGRAM(s) Nebulizer every 6 hours  lidocaine   Patch 1Patch Transdermal daily  guaiFENesin   Syrup  (Sugar-Free) 100milliGRAM(s) Oral every 6 hours  magnesium oxide 400milliGRAM(s) Oral three times a day with meals  lactobacillus acidophilus 1Tablet(s) Oral three times a day with meals  aspirin 325milliGRAM(s) Oral daily  furosemide    Tablet 40milliGRAM(s) Oral two times a day    MEDICATIONS  (PRN):  ondansetron Injectable 4milliGRAM(s) IV Push every 6 hours PRN Nausea  cyclobenzaprine 10milliGRAM(s) Oral three times a day PRN Muscle Spasm  morphine  - Injectable 3milliGRAM(s) IV Push every 6 hours PRN BREAKTHROUGH PAIN ONLY, IF pain persists at least one hour after oral medication  oxyCODONE  5 mG/acetaminophen 325 mG 1Tablet(s) Oral every 6 hours PRN Moderate Pain (4 - 6)  oxyCODONE  5 mG/acetaminophen 325 mG 2Tablet(s) Oral every 6 hours PRN Severe Pain (7 - 10)  acetaminophen   Tablet. 650milliGRAM(s) Oral every 6 hours PRN Mild Pain (1 - 3)  loperamide 2milliGRAM(s) Oral every 4 hours PRN Diarrhea - - -

## 2024-06-02 NOTE — ED CDU PROVIDER DISPOSITION NOTE - NSFOLLOWUPINSTRUCTIONS_ED_ALL_ED_FT
(1) You will need to follow up with your PMDin 2-3 days for your kidney infection. A copy of your results were given to you to bring to your appt. Read attached discharge paperwork.  (2) Drink plenty of fluids to stay hydrated.  (3) COMPLETE COURSE ABX  (4) Return to ER for severe pain, trouble breathing, unable to urinate, or any other concerns.

## 2024-06-02 NOTE — CONSULT NOTE ADULT - SUBJECTIVE AND OBJECTIVE BOX
Gyn Consult Note  JAROCHO IBARRA  49y  Female [id number]    HPI: 49y  LMP  p/w abdominal/back pain x5d and       Name of GYN Physician: [address] [facility]    OBHx: C/S x3 (pt unsure of reason   GYNHx: Denies fibroids, cysts, endometriosis, STI's, Abnormal pap smears   PMH: GERD, chronic constipation  PSH: C/S x3, R knee sx  Meds: Levofloxacin  NKDA    Vital Signs Last 24 Hrs  T(C): 37.2 (2024 07:17), Max: 38 (2024 01:53)  T(F): 98.9 (2024 07:17), Max: 100.4 (2024 01:53)  HR: 101 (2024 07:17) (98 - 112)  BP: 108/55 (2024 07:17) (103/73 - 108/55)  BP(mean): 70 (2024 07:17) (70 - 85)  RR: 18 (2024 07:17) (17 - 19)  SpO2: 99% (2024 07:17) (96% - 99%)    Parameters below as of 2024 07:17  Patient On (Oxygen Delivery Method): room air        Physical Exam:   General: sitting comfortably in bed, NAD  CV: clinically well perfused  Lungs: respiring comfortably on room air  Back: +L CVA tenderness  Abd: Soft, non-distended, +suprapubic tenderness  : No bleeding on pad. External labia wnl. Uterus wnl, nontender. Adnexa non palpable b/l. No CMT. Cervix closed.   Speculum Exam: No evidence of vaginal bleeding. Physiologic discharge.    Ext: non-tender b/l, no edema   Skin: warm to the touch    LABS:                              9.8    13.18 )-----------( 256      ( 2024 23:00 )             30.2     06-    139  |  103  |  10  ----------------------------<  130<H>  3.8   |  22  |  1.03    Ca    9.1      2024 23:00  Mg     2.2     06    TPro  7.8  /  Alb  4.0  /  TBili  0.4  /  DBili  x   /  AST  18  /  ALT  16  /  AlkPhos  53        Urinalysis Basic - ( 2024 23:25 )    Color: Yellow / Appearance: Clear / S.011 / pH: x  Gluc: x / Ketone: Negative mg/dL  / Bili: Negative / Urobili: 0.2 mg/dL   Blood: x / Protein: 30 mg/dL / Nitrite: Negative   Leuk Esterase: Large / RBC: 1 /HPF /  /HPF   Sq Epi: x / Non Sq Epi: 0 /HPF / Bacteria: Negative /HPF        RADIOLOGY & ADDITIONAL STUDIES:  < from: CT Abdomen and Pelvis w/ IV Cont (24 @ 23:38) >    ACC: [id number] EXAM:  CT ABDOMEN AND PELVIS IC   ORDERED BY: JEYSON BOWENS     PROCEDURE DATE:  2024          INTERPRETATION:  CLINICAL INFORMATION: Right flank pain    COMPARISON: CT abdomen and pelvis 2023    CONTRAST/COMPLICATIONS:  IV Contrast: Omnipaque 350  90 cc administered   10 cc discarded  Oral Contrast: NONE  Complications: None reported at time of study completion    PROCEDURE:  CT of the Abdomen and Pelvis was performed.  Sagittal and coronal reformats were performed.    FINDINGS:  LOWER CHEST: Within normal limits.    LIVER: Within normal limits.  BILE DUCTS: Normal caliber.  GALLBLADDER: Within normal limits.  SPLEEN: Within normal limits.  PANCREAS: Within normal limits.  ADRENALS: Within normal limits.  KIDNEYS/URETERS: Mild right hydroureteronephrosis to the level of the mid   ureter. No radiopaque urinary stone. Urothelial enhancement present   involving the bilateral collecting systems. Patchy hypoattenuation of the   bilateral renal parenchyma. No organizing fluid collection identified to   suggest abscess. Left extrarenal pelvis.    BLADDER: Minimally distended.  REPRODUCTIVE ORGANS: Fibroid uterus with a 1.0 cm lower uterine segment   myoma (601-70). The endometrium is prominent and measures 1.5 cm. No   adnexal mass.    BOWEL: No bowel obstruction. Appendix is normal.  PERITONEUM: No ascites.  VESSELS: Within normal limits.  RETROPERITONEUM/LYMPH NODES: No lymphadenopathy.  ABDOMINAL WALL: Within normal limits.  BONES: Within normal limits.      IMPRESSION:  Pyelonephritis bilaterally, without renal abscess at this time.    Mild right hydroureteronephrosis, with bilateral proximal urothelial   enhancement and urinary bladder wall thickening, suggesting   ureteritis/cystitis. No evidence of obstructing ureteral calculus or   nephrolithiasis at this time.    Myomatous uterus with endometrial prominence. Consider further evaluation   with pelvic ultrasound.      < end of copied text >    < from: US Transvaginal (24 @ 05:05) >    ACC: [id number] EXAM:  US TRANSVAGINAL   ORDERED BY:  ARLEEN PUTNAM     PROCEDURE DATE:  2024          INTERPRETATION:  CLINICAL INFORMATION: Vaginal bleeding    LMP: N/A    COMPARISON: CT abdomen pelvis from same day, CT abdomen 2023.    TECHNIQUE:  Endovaginal and transabdominal pelvic sonogram. Color and Spectral   Doppler was performed.    FINDINGS:  Uterus: 8.8 cm x 4.5 cm x 5.9 cm. Heterogenous echotexture. No discrete   myometrial mass.  Endometrium: 25 mm. Thickened with cysticchanges.  Cervix: Multiple nabothian cysts.    Right ovary: 1.4 cm x 2.4 cm x 1.7 cm. Within normal limits. Normal   arterial and venous waveforms.  Left ovary: 2.3 cm x 1.3 cm x 1.9 cm. A is 1.5 x 0.8 cm anechoic tubular   structure in left adnexa. An additional 0.9 cm left adnexal cyst. Normal   arterial and venous waveforms.    Fluid: None.      IMPRESSION:  Endometrial thickening, measuring up to 25 mm. Consider further   evaluation with OB/GYN consultation and endometrial sampling.    A tubular cystic structure in the left adnexa, indeterminate. Diagnostic   considerations include hydrosalpinx/pyosalpinx, with other etiologies not   excluded.    --- End of Report ---          < end of copied text >   Gyn Consult Note  JAROCHO IBARRA  49y  Female [id number]    HPI: 49y  LMP  p/w abdominal/back pain x5d and fevers/chills x2-3d in setting of dysuria and hematuria. GYN consulted due to L adnexal structure on CT A/P c/f possible hydrosalpinx vs pyosalpinx. Pt reports her PCP gave her a prescription for Levofloxacin a few days ago which she has been taking for presumed UTI. Pt reports she has had 3 UTIs in the past and her sx feel similar, reports last UTI was in April. Pt reports abdominal pain radiating to her back that improves with pain medications. Pt reports associated nausea and intermittent dizziness. Pt denies vomiting, chest pain, SOB, syncope, vaginal bleeding, vaginal discharge.    Of note, pt reports recent heavy, prolonged menses in April that lasted for 13d and required pad change q1h with intermittent passage of clots. Pt was seen in OB/GYN Clinic on  for attempted endometrial biopsy, however cervical os was unable to be dilated and sampling was not obtained. Pap/HPV NILM 24. Pt reports her menses have previously been regular, lasting 5d, without heavy vaginal bleeding.    Name of GYN Physician: 5 [facility], last saw . UCx (24): 50-99k E faecalis    OBHx: C/S x3 (pt unsure of indication for first C/S)  GYNHx: H/o small fibroid, Denies cysts, endometriosis, STI's, Abnormal pap smears   PMH: GERD, chronic constipation  PSH: C/S x3, R knee sx  Meds: Levofloxacin  NKDA    Vital Signs Last 24 Hrs  T(C): 37.2 (2024 07:17), Max: 38 (2024 01:53)  T(F): 98.9 (2024 07:17), Max: 100.4 (2024 01:53)  HR: 101 (2024 07:17) (98 - 112)  BP: 108/55 (2024 07:17) (103/73 - 108/55)  BP(mean): 70 (2024 07:17) (70 - 85)  RR: 18 (2024 07:17) (17 - 19)  SpO2: 99% (2024 07:17) (96% - 99%)    Parameters below as of 2024 07:17  Patient On (Oxygen Delivery Method): room air        Physical Exam:   General: sitting comfortably in bed, NAD  CV: clinically well perfused  Lungs: respiring comfortably on room air  Back: +L CVA tenderness  Abd: Soft, non-distended, +suprapubic tenderness  : No bleeding on pad. External labia wnl. Uterus wnl, nontender. Adnexa non palpable b/l. No CMT. Cervix closed.   Speculum Exam: No evidence of vaginal bleeding. Physiologic discharge.    Ext: non-tender b/l, no edema   Skin: warm to the touch    LABS:                              9.8    13.18 )-----------( 256      ( 2024 23:00 )             30.2     06-01    139  |  103  |  10  ----------------------------<  130<H>  3.8   |  22  |  1.03    Ca    9.1      2024 23:00  Mg     2.2     06-01    TPro  7.8  /  Alb  4.0  /  TBili  0.4  /  DBili  x   /  AST  18  /  ALT  16  /  AlkPhos  53  06-01      Urinalysis Basic - ( 2024 23:25 )    Color: Yellow / Appearance: Clear / S.011 / pH: x  Gluc: x / Ketone: Negative mg/dL  / Bili: Negative / Urobili: 0.2 mg/dL   Blood: x / Protein: 30 mg/dL / Nitrite: Negative   Leuk Esterase: Large / RBC: 1 /HPF /  /HPF   Sq Epi: x / Non Sq Epi: 0 /HPF / Bacteria: Negative /HPF        RADIOLOGY & ADDITIONAL STUDIES:  < from: CT Abdomen and Pelvis w/ IV Cont (24 @ 23:38) >    ACC: [id number] EXAM:  CT ABDOMEN AND PELVIS IC   ORDERED BY: JEYSON BOWENS     PROCEDURE DATE:  2024          INTERPRETATION:  CLINICAL INFORMATION: Right flank pain    COMPARISON: CT abdomen and pelvis 2023    CONTRAST/COMPLICATIONS:  IV Contrast: Omnipaque 350  90 cc administered   10 cc discarded  Oral Contrast: NONE  Complications: None reported at time of study completion    PROCEDURE:  CT of the Abdomen and Pelvis was performed.  Sagittal and coronal reformats were performed.    FINDINGS:  LOWER CHEST: Within normal limits.    LIVER: Within normal limits.  BILE DUCTS: Normal caliber.  GALLBLADDER: Within normal limits.  SPLEEN: Within normal limits.  PANCREAS: Within normal limits.  ADRENALS: Within normal limits.  KIDNEYS/URETERS: Mild right hydroureteronephrosis to the level of the mid   ureter. No radiopaque urinary stone. Urothelial enhancement present   involving the bilateral collecting systems. Patchy hypoattenuation of the   bilateral renal parenchyma. No organizing fluid collection identified to   suggest abscess. Left extrarenal pelvis.    BLADDER: Minimally distended.  REPRODUCTIVE ORGANS: Fibroid uterus with a 1.0 cm lower uterine segment   myoma (601-70). The endometrium is prominent and measures 1.5 cm. No   adnexal mass.    BOWEL: No bowel obstruction. Appendix is normal.  PERITONEUM: No ascites.  VESSELS: Within normal limits.  RETROPERITONEUM/LYMPH NODES: No lymphadenopathy.  ABDOMINAL WALL: Within normal limits.  BONES: Within normal limits.      IMPRESSION:  Pyelonephritis bilaterally, without renal abscess at this time.    Mild right hydroureteronephrosis, with bilateral proximal urothelial   enhancement and urinary bladder wall thickening, suggesting   ureteritis/cystitis. No evidence of obstructing ureteral calculus or   nephrolithiasis at this time.    Myomatous uterus with endometrial prominence. Consider further evaluation   with pelvic ultrasound.      < end of copied text >    < from: US Transvaginal (24 @ 05:05) >    ACC: [id number] EXAM:  US TRANSVAGINAL   ORDERED BY:  ARLEEN PUTNAM     PROCEDURE DATE:  2024          INTERPRETATION:  CLINICAL INFORMATION: Vaginal bleeding    LMP: N/A    COMPARISON: CT abdomen pelvis from same day, CT abdomen 2023.    TECHNIQUE:  Endovaginal and transabdominal pelvic sonogram. Color and Spectral   Doppler was performed.    FINDINGS:  Uterus: 8.8 cm x 4.5 cm x 5.9 cm. Heterogenous echotexture. No discrete   myometrial mass.  Endometrium: 25 mm. Thickened with cysticchanges.  Cervix: Multiple nabothian cysts.    Right ovary: 1.4 cm x 2.4 cm x 1.7 cm. Within normal limits. Normal   arterial and venous waveforms.  Left ovary: 2.3 cm x 1.3 cm x 1.9 cm. A is 1.5 x 0.8 cm anechoic tubular   structure in left adnexa. An additional 0.9 cm left adnexal cyst. Normal   arterial and venous waveforms.    Fluid: None.      IMPRESSION:  Endometrial thickening, measuring up to 25 mm. Consider further   evaluation with OB/GYN consultation and endometrial sampling.    A tubular cystic structure in the left adnexa, indeterminate. Diagnostic   considerations include hydrosalpinx/pyosalpinx, with other etiologies not   excluded.    --- End of Report ---          < end of copied text >   Gyn Consult Note  JAROCHO IBARRA  49y  Female [id number]    HPI: 49y  LMP  p/w abdominal/back pain x5d and fevers/chills x2-3d in setting of dysuria and hematuria. GYN consulted due to L adnexal structure on CT A/P c/f possible hydrosalpinx vs pyosalpinx. Pt reports her PCP gave her a prescription for Levofloxacin a few days ago which she has been taking for presumed UTI. Pt reports she has had 3 UTIs in the past and her sx feel similar, reports last UTI was in April. Pt reports abdominal pain radiating to her back that improves with pain medications. Pt reports associated nausea and intermittent dizziness. Pt denies vomiting, chest pain, SOB, syncope, vaginal bleeding, vaginal discharge.    Of note, pt reports recent heavy, prolonged menses  that lasted for 13d and required pad change q1h with intermittent passage of clots. Pt was seen in OB/GYN Clinic on  for attempted endometrial biopsy, however cervical os was unable to be dilated and sampling was not obtained. Pap/HPV NILM 24. Pt was given 10d of PO Provera and the bleeding stopped. Pt reports her menses have previously been regular, lasting 5d, without heavy vaginal bleeding.    Name of GYN Physician: 5 [facility], last saw . UCx (24): 50-99k E faecalis    OBHx: C/S x3 (pt unsure of indication for first C/S)  GYNHx: H/o small fibroid, Denies cysts, endometriosis, STI's, Abnormal pap smears   PMH: GERD, chronic constipation  PSH: C/S x3, R knee sx  Meds: Levofloxacin  NKDA    Vital Signs Last 24 Hrs  T(C): 37.2 (2024 07:17), Max: 38 (2024 01:53)  T(F): 98.9 (2024 07:17), Max: 100.4 (2024 01:53)  HR: 101 (2024 07:17) (98 - 112)  BP: 108/55 (2024 07:17) (103/73 - 108/55)  BP(mean): 70 (2024 07:17) (70 - 85)  RR: 18 (2024 07:17) (17 - 19)  SpO2: 99% (2024 07:17) (96% - 99%)    Parameters below as of 2024 07:17  Patient On (Oxygen Delivery Method): room air        Physical Exam:   General: sitting comfortably in bed, NAD  CV: clinically well perfused  Lungs: respiring comfortably on room air  Back: +L CVA tenderness  Abd: Soft, non-distended, +suprapubic tenderness  : No bleeding on pad. External labia wnl. Uterus wnl, nontender. Adnexa non palpable b/l. No CMT. Cervix closed.   Speculum Exam: No evidence of vaginal bleeding. Physiologic discharge.    Ext: non-tender b/l, no edema   Skin: warm to the touch    LABS:                              9.8    13.18 )-----------( 256      ( 2024 23:00 )             30.2     06-01    139  |  103  |  10  ----------------------------<  130<H>  3.8   |  22  |  1.03    Ca    9.1      2024 23:00  Mg     2.2     06-01    TPro  7.8  /  Alb  4.0  /  TBili  0.4  /  DBili  x   /  AST  18  /  ALT  16  /  AlkPhos  53  06-01      Urinalysis Basic - ( 2024 23:25 )    Color: Yellow / Appearance: Clear / S.011 / pH: x  Gluc: x / Ketone: Negative mg/dL  / Bili: Negative / Urobili: 0.2 mg/dL   Blood: x / Protein: 30 mg/dL / Nitrite: Negative   Leuk Esterase: Large / RBC: 1 /HPF /  /HPF   Sq Epi: x / Non Sq Epi: 0 /HPF / Bacteria: Negative /HPF        RADIOLOGY & ADDITIONAL STUDIES:  < from: CT Abdomen and Pelvis w/ IV Cont (24 @ 23:38) >    ACC: [id number] EXAM:  CT ABDOMEN AND PELVIS IC   ORDERED BY: JEYSON BOWENS     PROCEDURE DATE:  2024          INTERPRETATION:  CLINICAL INFORMATION: Right flank pain    COMPARISON: CT abdomen and pelvis 2023    CONTRAST/COMPLICATIONS:  IV Contrast: Omnipaque 350  90 cc administered   10 cc discarded  Oral Contrast: NONE  Complications: None reported at time of study completion    PROCEDURE:  CT of the Abdomen and Pelvis was performed.  Sagittal and coronal reformats were performed.    FINDINGS:  LOWER CHEST: Within normal limits.    LIVER: Within normal limits.  BILE DUCTS: Normal caliber.  GALLBLADDER: Within normal limits.  SPLEEN: Within normal limits.  PANCREAS: Within normal limits.  ADRENALS: Within normal limits.  KIDNEYS/URETERS: Mild right hydroureteronephrosis to the level of the mid   ureter. No radiopaque urinary stone. Urothelial enhancement present   involving the bilateral collecting systems. Patchy hypoattenuation of the   bilateral renal parenchyma. No organizing fluid collection identified to   suggest abscess. Left extrarenal pelvis.    BLADDER: Minimally distended.  REPRODUCTIVE ORGANS: Fibroid uterus with a 1.0 cm lower uterine segment   myoma (601-70). The endometrium is prominent and measures 1.5 cm. No   adnexal mass.    BOWEL: No bowel obstruction. Appendix is normal.  PERITONEUM: No ascites.  VESSELS: Within normal limits.  RETROPERITONEUM/LYMPH NODES: No lymphadenopathy.  ABDOMINAL WALL: Within normal limits.  BONES: Within normal limits.      IMPRESSION:  Pyelonephritis bilaterally, without renal abscess at this time.    Mild right hydroureteronephrosis, with bilateral proximal urothelial   enhancement and urinary bladder wall thickening, suggesting   ureteritis/cystitis. No evidence of obstructing ureteral calculus or   nephrolithiasis at this time.    Myomatous uterus with endometrial prominence. Consider further evaluation   with pelvic ultrasound.      < end of copied text >    < from: US Transvaginal (24 @ 05:05) >    ACC: [id number] EXAM:  US TRANSVAGINAL   ORDERED BY:  ARLEEN PUTNAM     PROCEDURE DATE:  2024          INTERPRETATION:  CLINICAL INFORMATION: Vaginal bleeding    LMP: N/A    COMPARISON: CT abdomen pelvis from same day, CT abdomen 2023.    TECHNIQUE:  Endovaginal and transabdominal pelvic sonogram. Color and Spectral   Doppler was performed.    FINDINGS:  Uterus: 8.8 cm x 4.5 cm x 5.9 cm. Heterogenous echotexture. No discrete   myometrial mass.  Endometrium: 25 mm. Thickened with cysticchanges.  Cervix: Multiple nabothian cysts.    Right ovary: 1.4 cm x 2.4 cm x 1.7 cm. Within normal limits. Normal   arterial and venous waveforms.  Left ovary: 2.3 cm x 1.3 cm x 1.9 cm. A is 1.5 x 0.8 cm anechoic tubular   structure in left adnexa. An additional 0.9 cm left adnexal cyst. Normal   arterial and venous waveforms.    Fluid: None.      IMPRESSION:  Endometrial thickening, measuring up to 25 mm. Consider further   evaluation with OB/GYN consultation and endometrial sampling.    A tubular cystic structure in the left adnexa, indeterminate. Diagnostic   considerations include hydrosalpinx/pyosalpinx, with other etiologies not   excluded.    --- End of Report ---          < end of copied text >

## 2024-06-02 NOTE — ED CDU PROVIDER DISPOSITION NOTE - ATTENDING APP SHARED VISIT CONTRIBUTION OF CARE
RGUJRAL 49-year-old female presented to the hospital with abdominal pain and back pain associated with dysuria and fever.  Patient had a CT scan and urine that showed patient with a UTI.  Patient also noted to have pyelonephritis bilaterally.  Patient also noted to have a left tubular structure.  Patient was evaluated by GYN and recommended outpatient follow-up for that she was structure low suspicion for PID.  Recommend IV antibiotics for pyelonephritis.  Patient reassessed this morning states she still feels generalized weakness.  Patient had an episode of vaginal bleeding her last period which was treated with Provera.  Currently denies any vaginal bleeding.  Patient's hemoglobin went from 9.8-7.7 questionable hemodilution.  Patient reports no vaginal bleeding.  No prior blood transfusion.  On exam patient appears with generalized weakness.  Abdomen soft nontender.  Will admit patient for IV antibiotics, continue to monitor. Plan discussed with patient.

## 2024-06-02 NOTE — ED CDU PROVIDER INITIAL DAY NOTE - DETAILS
49y  LMP  p/w abdominal/back pain x5d and fevers/chills x2-3d in setting of dysuria and hematuria.   *PYELONEPHRITIS   -IV abx  -pain control  -appreciate GYN input  -Discussed with Dr Brennan

## 2024-06-03 DIAGNOSIS — K21.9 GASTRO-ESOPHAGEAL REFLUX DISEASE WITHOUT ESOPHAGITIS: ICD-10-CM

## 2024-06-03 DIAGNOSIS — A41.9 SEPSIS, UNSPECIFIED ORGANISM: ICD-10-CM

## 2024-06-03 DIAGNOSIS — Z87.19 PERSONAL HISTORY OF OTHER DISEASES OF THE DIGESTIVE SYSTEM: ICD-10-CM

## 2024-06-03 DIAGNOSIS — R10.9 UNSPECIFIED ABDOMINAL PAIN: ICD-10-CM

## 2024-06-03 DIAGNOSIS — N30.90 CYSTITIS, UNSPECIFIED WITHOUT HEMATURIA: ICD-10-CM

## 2024-06-03 LAB
ANION GAP SERPL CALC-SCNC: 12 MMOL/L — SIGNIFICANT CHANGE UP (ref 5–17)
BASE EXCESS BLDV CALC-SCNC: -1.7 MMOL/L — SIGNIFICANT CHANGE UP (ref -2–3)
BASOPHILS # BLD AUTO: 0.01 K/UL — SIGNIFICANT CHANGE UP (ref 0–0.2)
BASOPHILS NFR BLD AUTO: 0.1 % — SIGNIFICANT CHANGE UP (ref 0–2)
BUN SERPL-MCNC: 7 MG/DL — SIGNIFICANT CHANGE UP (ref 7–23)
CA-I SERPL-SCNC: 1.17 MMOL/L — SIGNIFICANT CHANGE UP (ref 1.15–1.33)
CALCIUM SERPL-MCNC: 8.1 MG/DL — LOW (ref 8.4–10.5)
CHLORIDE BLDV-SCNC: 111 MMOL/L — HIGH (ref 96–108)
CHLORIDE SERPL-SCNC: 110 MMOL/L — HIGH (ref 96–108)
CO2 BLDV-SCNC: 24 MMOL/L — SIGNIFICANT CHANGE UP (ref 22–26)
CO2 SERPL-SCNC: 19 MMOL/L — LOW (ref 22–31)
CREAT SERPL-MCNC: 0.88 MG/DL — SIGNIFICANT CHANGE UP (ref 0.5–1.3)
CULTURE RESULTS: NO GROWTH — SIGNIFICANT CHANGE UP
EGFR: 81 ML/MIN/1.73M2 — SIGNIFICANT CHANGE UP
EOSINOPHIL # BLD AUTO: 0 K/UL — SIGNIFICANT CHANGE UP (ref 0–0.5)
EOSINOPHIL NFR BLD AUTO: 0 % — SIGNIFICANT CHANGE UP (ref 0–6)
GAS PNL BLDV: 139 MMOL/L — SIGNIFICANT CHANGE UP (ref 136–145)
GAS PNL BLDV: SIGNIFICANT CHANGE UP
GAS PNL BLDV: SIGNIFICANT CHANGE UP
GLUCOSE BLDV-MCNC: 121 MG/DL — HIGH (ref 70–99)
GLUCOSE SERPL-MCNC: 107 MG/DL — HIGH (ref 70–99)
HCO3 BLDV-SCNC: 23 MMOL/L — SIGNIFICANT CHANGE UP (ref 22–29)
HCT VFR BLD CALC: 22.9 % — LOW (ref 34.5–45)
HCT VFR BLD CALC: 24 % — LOW (ref 34.5–45)
HCT VFR BLDA CALC: 23 % — LOW (ref 34.5–46.5)
HGB BLD CALC-MCNC: 7.8 G/DL — LOW (ref 11.7–16.1)
HGB BLD-MCNC: 7.4 G/DL — LOW (ref 11.5–15.5)
HGB BLD-MCNC: 7.7 G/DL — LOW (ref 11.5–15.5)
HOROWITZ INDEX BLDV+IHG-RTO: SIGNIFICANT CHANGE UP
IMM GRANULOCYTES NFR BLD AUTO: 0.5 % — SIGNIFICANT CHANGE UP (ref 0–0.9)
LACTATE BLDV-MCNC: 0.7 MMOL/L — SIGNIFICANT CHANGE UP (ref 0.5–2)
LYMPHOCYTES # BLD AUTO: 0.74 K/UL — LOW (ref 1–3.3)
LYMPHOCYTES # BLD AUTO: 9.1 % — LOW (ref 13–44)
MCHC RBC-ENTMCNC: 23.2 PG — LOW (ref 27–34)
MCHC RBC-ENTMCNC: 23.3 PG — LOW (ref 27–34)
MCHC RBC-ENTMCNC: 32.1 GM/DL — SIGNIFICANT CHANGE UP (ref 32–36)
MCHC RBC-ENTMCNC: 32.3 GM/DL — SIGNIFICANT CHANGE UP (ref 32–36)
MCV RBC AUTO: 71.8 FL — LOW (ref 80–100)
MCV RBC AUTO: 72.7 FL — LOW (ref 80–100)
MONOCYTES # BLD AUTO: 0.61 K/UL — SIGNIFICANT CHANGE UP (ref 0–0.9)
MONOCYTES NFR BLD AUTO: 7.5 % — SIGNIFICANT CHANGE UP (ref 2–14)
NEUTROPHILS # BLD AUTO: 6.69 K/UL — SIGNIFICANT CHANGE UP (ref 1.8–7.4)
NEUTROPHILS NFR BLD AUTO: 82.8 % — HIGH (ref 43–77)
NRBC # BLD: 0 /100 WBCS — SIGNIFICANT CHANGE UP (ref 0–0)
NRBC # BLD: 0 /100 WBCS — SIGNIFICANT CHANGE UP (ref 0–0)
PCO2 BLDV: 37 MMHG — LOW (ref 39–42)
PH BLDV: 7.4 — SIGNIFICANT CHANGE UP (ref 7.32–7.43)
PLATELET # BLD AUTO: 148 K/UL — LOW (ref 150–400)
PLATELET # BLD AUTO: 155 K/UL — SIGNIFICANT CHANGE UP (ref 150–400)
PO2 BLDV: 55 MMHG — HIGH (ref 25–45)
POTASSIUM BLDV-SCNC: 3.3 MMOL/L — LOW (ref 3.5–5.1)
POTASSIUM SERPL-MCNC: 3.4 MMOL/L — LOW (ref 3.5–5.3)
POTASSIUM SERPL-SCNC: 3.4 MMOL/L — LOW (ref 3.5–5.3)
RBC # BLD: 3.19 M/UL — LOW (ref 3.8–5.2)
RBC # BLD: 3.3 M/UL — LOW (ref 3.8–5.2)
RBC # FLD: 17.1 % — HIGH (ref 10.3–14.5)
RBC # FLD: 17.2 % — HIGH (ref 10.3–14.5)
SAO2 % BLDV: 89.2 % — HIGH (ref 67–88)
SODIUM SERPL-SCNC: 141 MMOL/L — SIGNIFICANT CHANGE UP (ref 135–145)
SPECIMEN SOURCE: SIGNIFICANT CHANGE UP
WBC # BLD: 8.09 K/UL — SIGNIFICANT CHANGE UP (ref 3.8–10.5)
WBC # BLD: 8.46 K/UL — SIGNIFICANT CHANGE UP (ref 3.8–10.5)
WBC # FLD AUTO: 8.09 K/UL — SIGNIFICANT CHANGE UP (ref 3.8–10.5)
WBC # FLD AUTO: 8.46 K/UL — SIGNIFICANT CHANGE UP (ref 3.8–10.5)

## 2024-06-03 PROCEDURE — 99223 1ST HOSP IP/OBS HIGH 75: CPT

## 2024-06-03 PROCEDURE — 99233 SBSQ HOSP IP/OBS HIGH 50: CPT

## 2024-06-03 RX ORDER — ONDANSETRON 8 MG/1
1 TABLET, FILM COATED ORAL
Qty: 0 | Refills: 0 | DISCHARGE

## 2024-06-03 RX ORDER — POTASSIUM CHLORIDE 20 MEQ
40 PACKET (EA) ORAL ONCE
Refills: 0 | Status: COMPLETED | OUTPATIENT
Start: 2024-06-03 | End: 2024-06-03

## 2024-06-03 RX ORDER — FAMOTIDINE 10 MG/ML
20 INJECTION INTRAVENOUS ONCE
Refills: 0 | Status: COMPLETED | OUTPATIENT
Start: 2024-06-03 | End: 2024-06-03

## 2024-06-03 RX ORDER — FERROUS SULFATE 325(65) MG
1 TABLET ORAL
Refills: 0 | DISCHARGE

## 2024-06-03 RX ORDER — SODIUM CHLORIDE 9 MG/ML
1000 INJECTION INTRAMUSCULAR; INTRAVENOUS; SUBCUTANEOUS
Refills: 0 | Status: DISCONTINUED | OUTPATIENT
Start: 2024-06-03 | End: 2024-06-04

## 2024-06-03 RX ORDER — ENOXAPARIN SODIUM 100 MG/ML
40 INJECTION SUBCUTANEOUS EVERY 24 HOURS
Refills: 0 | Status: DISCONTINUED | OUTPATIENT
Start: 2024-06-03 | End: 2024-06-07

## 2024-06-03 RX ORDER — MEDROXYPROGESTERONE ACETATE 150 MG/ML
1 INJECTION, SUSPENSION, EXTENDED RELEASE INTRAMUSCULAR
Refills: 0 | DISCHARGE

## 2024-06-03 RX ORDER — PIPERACILLIN AND TAZOBACTAM 4; .5 G/20ML; G/20ML
3.38 INJECTION, POWDER, LYOPHILIZED, FOR SOLUTION INTRAVENOUS ONCE
Refills: 0 | Status: COMPLETED | OUTPATIENT
Start: 2024-06-03 | End: 2024-06-03

## 2024-06-03 RX ORDER — OMEPRAZOLE 10 MG/1
1 CAPSULE, DELAYED RELEASE ORAL
Refills: 0 | DISCHARGE

## 2024-06-03 RX ORDER — SIMETHICONE 80 MG/1
80 TABLET, CHEWABLE ORAL ONCE
Refills: 0 | Status: COMPLETED | OUTPATIENT
Start: 2024-06-03 | End: 2024-06-03

## 2024-06-03 RX ORDER — POLYETHYLENE GLYCOL 3350 17 G/17G
17 POWDER, FOR SOLUTION ORAL DAILY
Refills: 0 | Status: DISCONTINUED | OUTPATIENT
Start: 2024-06-03 | End: 2024-06-07

## 2024-06-03 RX ORDER — FERROUS SULFATE 325(65) MG
325 TABLET ORAL DAILY
Refills: 0 | Status: DISCONTINUED | OUTPATIENT
Start: 2024-06-03 | End: 2024-06-07

## 2024-06-03 RX ORDER — PANTOPRAZOLE SODIUM 20 MG/1
40 TABLET, DELAYED RELEASE ORAL
Refills: 0 | Status: DISCONTINUED | OUTPATIENT
Start: 2024-06-03 | End: 2024-06-04

## 2024-06-03 RX ORDER — PIPERACILLIN AND TAZOBACTAM 4; .5 G/20ML; G/20ML
3.38 INJECTION, POWDER, LYOPHILIZED, FOR SOLUTION INTRAVENOUS EVERY 8 HOURS
Refills: 0 | Status: DISCONTINUED | OUTPATIENT
Start: 2024-06-03 | End: 2024-06-07

## 2024-06-03 RX ORDER — SENNA PLUS 8.6 MG/1
2 TABLET ORAL AT BEDTIME
Refills: 0 | Status: DISCONTINUED | OUTPATIENT
Start: 2024-06-03 | End: 2024-06-07

## 2024-06-03 RX ORDER — MEDROXYPROGESTERONE ACETATE 150 MG/ML
10 INJECTION, SUSPENSION, EXTENDED RELEASE INTRAMUSCULAR DAILY
Refills: 0 | Status: DISCONTINUED | OUTPATIENT
Start: 2024-06-03 | End: 2024-06-04

## 2024-06-03 RX ADMIN — Medication 200 MILLIGRAM(S): at 05:24

## 2024-06-03 RX ADMIN — Medication 100 MILLIGRAM(S): at 10:49

## 2024-06-03 RX ADMIN — Medication 15 MILLIGRAM(S): at 15:40

## 2024-06-03 RX ADMIN — Medication 975 MILLIGRAM(S): at 21:21

## 2024-06-03 RX ADMIN — Medication 975 MILLIGRAM(S): at 09:30

## 2024-06-03 RX ADMIN — Medication 15 MILLIGRAM(S): at 22:08

## 2024-06-03 RX ADMIN — PIPERACILLIN AND TAZOBACTAM 25 GRAM(S): 4; .5 INJECTION, POWDER, LYOPHILIZED, FOR SOLUTION INTRAVENOUS at 13:05

## 2024-06-03 RX ADMIN — FAMOTIDINE 20 MILLIGRAM(S): 10 INJECTION INTRAVENOUS at 09:19

## 2024-06-03 RX ADMIN — Medication 200 MILLIGRAM(S): at 21:12

## 2024-06-03 RX ADMIN — SIMETHICONE 80 MILLIGRAM(S): 80 TABLET, CHEWABLE ORAL at 22:08

## 2024-06-03 RX ADMIN — CEFTRIAXONE 100 MILLIGRAM(S): 500 INJECTION, POWDER, FOR SOLUTION INTRAMUSCULAR; INTRAVENOUS at 00:05

## 2024-06-03 RX ADMIN — ENOXAPARIN SODIUM 40 MILLIGRAM(S): 100 INJECTION SUBCUTANEOUS at 21:12

## 2024-06-03 RX ADMIN — Medication 975 MILLIGRAM(S): at 04:00

## 2024-06-03 RX ADMIN — SODIUM CHLORIDE 100 MILLILITER(S): 9 INJECTION INTRAMUSCULAR; INTRAVENOUS; SUBCUTANEOUS at 09:19

## 2024-06-03 RX ADMIN — Medication 15 MILLIGRAM(S): at 00:15

## 2024-06-03 RX ADMIN — Medication 15 MILLIGRAM(S): at 07:05

## 2024-06-03 RX ADMIN — FAMOTIDINE 20 MILLIGRAM(S): 10 INJECTION INTRAVENOUS at 22:45

## 2024-06-03 RX ADMIN — PIPERACILLIN AND TAZOBACTAM 200 GRAM(S): 4; .5 INJECTION, POWDER, LYOPHILIZED, FOR SOLUTION INTRAVENOUS at 09:18

## 2024-06-03 RX ADMIN — Medication 15 MILLIGRAM(S): at 23:00

## 2024-06-03 RX ADMIN — Medication 975 MILLIGRAM(S): at 03:06

## 2024-06-03 RX ADMIN — Medication 40 MILLIEQUIVALENT(S): at 21:12

## 2024-06-03 RX ADMIN — Medication 975 MILLIGRAM(S): at 22:30

## 2024-06-03 RX ADMIN — PIPERACILLIN AND TAZOBACTAM 25 GRAM(S): 4; .5 INJECTION, POWDER, LYOPHILIZED, FOR SOLUTION INTRAVENOUS at 21:12

## 2024-06-03 RX ADMIN — Medication 200 MILLIGRAM(S): at 15:40

## 2024-06-03 NOTE — ED ADULT NURSE REASSESSMENT NOTE - NSFALLHARMRISKINTERV_ED_ALL_ED

## 2024-06-03 NOTE — H&P ADULT - PROBLEM SELECTOR PLAN 1
blood cultures and urine culture negative  however CT strongly suggestive of infection  patient recently as outpatient had po Levaquin which may be the reason for negative urine culture  recent urine culture April 2024 enterococcus sensitive to ampicillin  will change antibiotics to piperacillin/tazobactam   ID controlled  continue doxycycline for possible concomitant PID  Gyn consultation appreciated

## 2024-06-03 NOTE — H&P ADULT - HISTORY OF PRESENT ILLNESS
49y female with pmhx of GERD who presents to the Emergency Department with complaints of abdominal pain + dysuria. Contrary to triage note- patient vaginal bleeding from menstrual period. Patient complaining of fever + chills over the last 48-72 hours. Patient states the abdominal pain is non radiating and diffuse in nature. Patient also complaining of dysuria. Patient states she was recently started on levoflaxacin (only took one dose) however the pain acutely worsened and she presented to the Emergency Department

## 2024-06-03 NOTE — H&P ADULT - NSICDXPASTMEDICALHX_GEN_ALL_CORE_FT
PAST MEDICAL HISTORY:  GERD (gastroesophageal reflux disease)     History of chronic constipation

## 2024-06-03 NOTE — ED CDU PROVIDER SUBSEQUENT DAY NOTE - NS ED MD PROGRESS NOTE PROVIDER NAME2 FT
[facility]  [facility]  [address] [place] [address]  Dept: [phone]  Dept Fax: [phone]     PAIN PROGRESS NOTE  Date of patient's visit: 2/17/2020  Patient's Name:  Denise Martinez YOB: 1947            Patient Care Team:  James Ambrose MD as PCP - General (Family Medicine)  Merritt Boykin DPM as Physician (Podiatry)  Janice Lefort, DPM as Physician (Podiatry)      Chief Complaint   Patient presents with    Foot Pain    Nail Problem       Subjective: This Denise Martinez comes to clinic for foot and nail care. Pt currently has complaint of thickened, painful, elongated nails that he/she cannot manage by themselves. Pt. Relates pain to nails with shoe gear. Pt's primary care physician is James Ambrose MD last seen 9/18/19    Allergies   Allergen Reactions    Acetaminophen Nausea Only and Other (See Comments)    Gabapentin Other (See Comments)     Felt paralyzed and in pain    Meperidine Nausea And Vomiting    Midazolam Nausea And Vomiting     Violent vomiting      Current Outpatient Medications on File Prior to Visit   Medication Sig Dispense Refill    clotrimazole-betamethasone (LOTRISONE) 1-0.05 % cream Apply topically 2 times daily. 45 g 2    chlorhexidine (PERIDEX) 0.12 % solution RINSE WITH 1/2 OUNCE FOR 30 SECONDS BID      ibuprofen (ADVIL) 200 MG tablet Take 200 mg by mouth 3 times daily       No current facility-administered medications on file prior to visit. Review of Systems. Review of Systems:   History obtained from chart review and the patient  General ROS: negative for - chills, fatigue, fever, night sweats or weight gain  Constitutional: Negative for chills, diaphoresis, fatigue, fever and unexpected weight change. Musculoskeletal: Positive for arthralgias, gait problem and joint swelling. Neurological ROS: negative for - behavioral changes, confusion, headaches or seizures.  Negative for weakness and [place] ORTIZ

## 2024-06-03 NOTE — ED ADULT NURSE REASSESSMENT NOTE - NS ED NURSE REASSESS COMMENT FT1
09.00 AM  Pt is evaluated by CDU MD Bhumika Brower . pt is not feeling good  continues to have fever Chills  Pt is admitted .Awaiting for the bed
1100 Received the Pt from  ANNIE Ennis   Pt is Observed for Cystitis . Received the Pt A&OX 4  Pt obeys commands Concha N/V/D fever chills cp SOB   Comfort care & safety measures continued  IV site looks clean & dry no signs of infiltration noted pt denies  pain IV site .Pt is oriented to the unit Plan of care explained .  Pt is advised to call for help  call bell with in the reach pt verbalized the understanding .  pending CDU  MD tomlin . GCS 15/15 A&OX 4 PERRLA  size 3 Strong upper & lower extremities steady gait  Pt is ambulatory & independent   No facial droop  No Hand Leg drop denies numbness tingling  Plan of care ongoing
17.00 Pt is febrile with Chills & rigors SARAHI Valiente  made aware . Medicated as per order
Hb this AM 7.4. Patient states she had no vaginal bleeding. PA Angel aware. CBC repeated, awaiting result.
Pt received from ANNIE Goodman at 19:00hrs. Pt A&O x 4. Pt is observed in CDU for pyelonephritis for IV antibiotics. Toradol given for pain with good relief. Pt denies any chest pain, SOB, dizziness or palpitations. Temp 100.1 at MN, IV antibiotics continued. IV fluid infusing, IV site patent and free of signs of infiltration. Pt resting in bed. Safety & comfort measures maintained. Call bell in reach. Will continue to monitor.
Report taken from ANNIE Christina. Pt introduced to oncoming RN. A&Ox4, breathing unlabored, resting comfortably in bed. Pt denies any complaints at this time. Bed in lowest position.
07.00 Received the Pt from  ANNIE Jones  Pt is Observed for Recurrent UTI/pyelonephritis. Received the Pt A&OX 4  Pt obeys commands Concha N/V/D cp SOB . Pt is febrile with Chills  100.7 F temp oral  Comfort care & safety measures continued  IV site looks clean & dry no signs of infiltration noted pt denies  pain IV site .Pt is oriented to the unit Plan of care explained .  Pt is advised to call for help  call bell with in the reach pt verbalized the understanding .  pending CDU  MD tomlin . GCS 15/15 A&OX 4 PERRLA  size 3 Strong upper & lower extremities steady gait  Pt is ambulatory & independent   No facial droop  No Hand Leg drop denies numbness tingling  Pt states she feels very weak tired generalized body pain  & states she has no energy  Plan of care ongoing

## 2024-06-03 NOTE — ED CDU PROVIDER SUBSEQUENT DAY NOTE - PROGRESS NOTE DETAILS
CDU PROGRESS NOTE SARAHI KUNZ: Pt resting comfortably, Temp 99.6F. Will continue to monitor, f/u am labs. CDU PROGRESS NOTE SARAHI KUNZ: Repeat H/H downtrending  9.8/30.2 to 7.4/22.9. CDU PROGRESS NOTE SARAHI KUNZ: Repeat H/H downtrending  9.8/30.2 to 7.4/22.9. Pt reports no active bleeding in the past 2-3 days. States she was having intermittent vaginal spotting, but was started on medications by GYN. c/d/w Dr. Collier, will repeat H/H and discuss w/ GYN further recs. patient seen and examined at bedside this morning. states she is not having vaginal bleeding. states has been having rigors overnight with low grade temp. spoke with Dr. Robles. recommend switching abx to zosyn based on previous urine cultures. patient to be admitted for further eval and management. discussed with ED attending. patient seen and examined at bedside this morning. states she is not having vaginal bleeding. states has been having rigors overnight with low grade temp. spoke with Dr. Robles. recommend switching abx to zosyn based on previous urine cultures and to consult ID. ID called, awaiting call back. patient to be admitted for further eval and management. discussed with ED attending. patient seen and examined at bedside this morning. states she is not having vaginal bleeding. states has been having rigors overnight with low grade temp. spoke with Dr. Robles. recommend switching abx to zosyn based on previous urine cultures and to consult ID. ID called, awaiting call back. spoke with gyn, recommend continuing doxy. patient to be admitted for further eval and management. discussed with ED attending.

## 2024-06-03 NOTE — H&P ADULT - ASSESSMENT
49y female with pmhx of GERD who presents to the Emergency Department  with complaints of abdominal pain + dysuria clinical presentation consistent with cystitis with associated sepsis, concern for possible PID

## 2024-06-03 NOTE — ED CDU PROVIDER SUBSEQUENT DAY NOTE - HISTORY
CDU PROGRESS NOTE PA ZE: Pt resting in stretcher, Temp 100.1F. Pt with rigors and endorse generalized malaise and chills. Abdomen soft +L CVA tenderness, +suprapubic tenderness. No rebound or guarding. Will give Toradol 15mg IVP and reassess.

## 2024-06-03 NOTE — CONSULT NOTE ADULT - SUBJECTIVE AND OBJECTIVE BOX
Patient is a 49y old  Female who presents with a chief complaint of fever chills flank pain (03 Jun 2024 12:19)    HPI:   49y female with pmhx of GERD who presents to the Emergency Department with complaints of abdominal pain + dysuria. Contrary to triage note- patient vaginal bleeding from menstrual period. Patient complaining of fever + chills over the last 48-72 hours. Patient states the abdominal pain is non radiating and diffuse in nature. Patient also complaining of dysuria. Patient states she was recently started on levoflaxacin (only took one dose) however the pain acutely worsened and she presented to the Emergency Department  (03 Jun 2024 12:19)    She complains of continued fever dysuria. She has lower back and flank pain  Previously hospitalized 12/11 --> 12/13/22 with abd pain constipation related to opioid analgesics after arthroscopic knee sugery 12/4/22  - she developed emphysiematour cystitis  12/10/22 Urine Cx; Klab pn Resist to Cipro, Levo, amp  & Ps Aerug SUSC to all tested    6/2/23 Urine Cx; E coli R AMP only  04.18.24 Urine 50,000 - 99,000 CFU/mL Enterococcus faecalis  - Vancomycin: S 2  - Tetracycline: S <=1  - Levofloxacin: S 1  - Nitrofurantoin: S <=32 Should not be used to treat pyelonephritis.  - Ampicillin: S <=2 Predicts results to ampicillin/sulbactam, amoxacillin-clavulanate and piperacillin-tazobactam.  - Ciprofloxacin: S <=1      PAST MEDICAL & SURGICAL HISTORY:  GERD (gastroesophageal reflux disease)  History of chronic constipation  S/P knee surgery  12/4/22    Social history: , lives with  and kids , no EtOH/Tob    FAMILY HISTORY:  FH: throat cancer (Father)    Family history of chronic constipation (Mother)      REVIEW OF SYSTEMS:  CONSTITUTIONAL:+ weakness, fevers No chills  EYES/ENT: No visual changes;  No vertigo or throat pain   NECK: No pain or stiffness  RESPIRATORY: No cough, wheezing, hemoptysis; No shortness of breath  CARDIOVASCULAR: No chest pain or palpitations  GASTROINTESTINAL: No abdominal or epigastric pain. No nausea, vomiting, or hematemesis; No diarrhea or constipation. No melena or hematochezia.  GENITOURINARY: + dysuria, frequency or hematuria  + back and flank pain  NEUROLOGICAL: No numbness or weakness  SKIN: No itching, burning, rashes, or lesions   All other review of systems is negative unless indicated above    Allergies  No Known Allergies    Antimicrobials:  doxycycline IVPB      doxycycline IVPB 100 milliGRAM(s) IV Intermittent every 12 hours  piperacillin/tazobactam IVPB.. 3.375 Gram(s) IV Intermittent every 8 hours      Vital Signs Last 24 Hrs  T(C): 37.2 (03 Jun 2024 10:53), Max: 39.2 (02 Jun 2024 17:00)  T(F): 99 (03 Jun 2024 10:53), Max: 102.5 (02 Jun 2024 17:00)  HR: 88 (03 Jun 2024 10:53) (86 - 120)  BP: 106/63 (03 Jun 2024 10:53) (93/51 - 124/66)  BP(mean): 76 (03 Jun 2024 07:16) (64 - 83)  RR: 18 (03 Jun 2024 10:53) (17 - 19)  SpO2: 98% (03 Jun 2024 10:53) (90% - 100%)    Parameters below as of 03 Jun 2024 10:53  Patient On (Oxygen Delivery Method): room air      PHYSICAL EXAM:  General: WN/WD ill appearing Non-toxic  Neurology: A&Ox3, nonfocal  Respiratory: Clear to auscultation bilaterally  CV: RRR, S1S2, no murmurs, rubs or gallops  Abdominal: Soft, lower abd tender, non-distended, + flank tenderness  Extremities: No edema,  Line Sites: Clear  Skin: No rash                          7.7    8.46  )-----------( 148      ( 03 Jun 2024 06:31 )             24.0   WBC Count: 8.46 (06-03 @ 06:31)  WBC Count: 8.09 (06-03 @ 05:00)  WBC Count: 13.18 (06-01 @ 23:00)      Creatinine: 1.03 (06-01 @ 23:00)        06-01    139  |  103  |  10  ----------------------------<  130<H>  3.8   |  22  |  1.03    Ca    9.1      01 Jun 2024 23:00  Mg     2.2     06-01    TPro  7.8  /  Alb  4.0  /  TBili  0.4  /  DBili  x   /  AST  18  /  ALT  16  /  AlkPhos  53  06-01    Urinalysis + Microscopic Examination (06.01.24 @ 23:25)   pH Urine: 6.0  Urine Appearance: Clear  Color: Yellow  Specific Gravity: 1.011  Protein, Urine: 30 mg/dL  Glucose Qualitative, Urine: Negative mg/dL  Ketone - Urine: Negative mg/dL  Blood, Urine: Trace  Bilirubin: Negative  Urobilinogen: 0.2 mg/dL  Leukocyte Esterase Concentration: Large  Nitrite: Negative  White Blood Cell - Urine: 130 /HPF  Red Blood Cell - Urine: 1 /HPF  Bacteria: Negative /HPF  Cast: 0 /LPF  Epithelial Cells: 0 /HPF    MICROBIOLOGY:  Clean Catch Clean Catch (Midstream)  06-01-24   No growth  --  --    Radiology:  < from: US Transvaginal (06.02.24 @ 05:05) >  IMPRESSION:  Endometrial thickening, measuring up to 25 mm. Consider further   evaluation with OB/GYN consultation and endometrial sampling.    A tubular cystic structure in the left adnexa, indeterminate. Diagnostic   considerations include hydrosalpinx/pyosalpinx, with other etiologies not   excluded.    < end of copied text >  < from: CT Abdomen and Pelvis w/ IV Cont (06.01.24 @ 23:38) >  FINDINGS:  LOWER CHEST: Within normal limits.    LIVER: Within normal limits.  BILE DUCTS: Normal caliber.  GALLBLADDER: Within normal limits.  SPLEEN: Within normal limits.  PANCREAS: Within normal limits.  ADRENALS: Within normal limits.  KIDNEYS/URETERS: Mild right hydroureteronephrosis to the level of the mid   ureter. No radiopaque urinary stone. Urothelial enhancement present   involving the bilateral collecting systems. Patchy hypoattenuation of the   bilateral renal parenchyma. No organizing fluid collection identified to   suggest abscess. Left extrarenal pelvis.    BLADDER: Minimally distended.  REPRODUCTIVE ORGANS: Fibroid uterus with a 1.0 cm lower uterine segment   myoma (601-70). The endometrium is prominent and measures 1.5 cm. No   adnexal mass.    BOWEL: No bowel obstruction. Appendix is normal.  PERITONEUM: No ascites.  VESSELS: Within normal limits.  RETROPERITONEUM/LYMPH NODES: No lymphadenopathy.  ABDOMINAL WALL: Within normal limits.  BONES: Within normal limits.      IMPRESSION:  Pyelonephritis bilaterally, without renal abscess at this time.    Mild right hydroureteronephrosis, with bilateral proximal urothelial   enhancement and urinary bladder wall thickening, suggesting   ureteritis/cystitis. No evidence of obstructing ureteral calculus or   nephrolithiasis at this time.    Myomatous uterus with endometrial prominence. Consider further evaluation   with pelvic ultrasound.    < end of copied text >  < from: Xray Chest 2 Views PA/Lat (06.01.24 @ 23:09) >  IMPRESSION:  No focal consolidation.    < end of copied text >  < from: CT Abdomen and Pelvis w/ IV Cont (06.02.23 @ 02:00) >      < end of copied text >      Jose Hernandez MD; Division of Infectious Disease; Pager: [phone]; nights and weekends: [phone]

## 2024-06-03 NOTE — H&P ADULT - NSHPPHYSICALEXAM_GEN_ALL_CORE
PHYSICAL EXAM: vital signs noted on Sunrise  in no apparent distress  HEENT: CHIOMA EOMI  Neck: Supple, no JVD, no thyromegaly  Lungs: no wheeze, no crackles  CVS: S1 S2 no M/R/G  Abdomen: no tenderness, no organomegaly, BS present  Neuro: AO x 3 no focal weakness, no sensory abnormalities  Psych: appropriate affect  Skin: warm, dry  Ext: no cyanosis or clubbing, no edema  Msk: no joint swelling or deformities  Back: right CVA tenderness, no kyphosis/scoliosis

## 2024-06-04 ENCOUNTER — RESULT REVIEW (OUTPATIENT)
Age: 49
End: 2024-06-04

## 2024-06-04 DIAGNOSIS — R06.02 SHORTNESS OF BREATH: ICD-10-CM

## 2024-06-04 LAB
A1C WITH ESTIMATED AVERAGE GLUCOSE RESULT: 5.8 % — HIGH (ref 4–5.6)
ADD ON TEST-SPECIMEN IN LAB: SIGNIFICANT CHANGE UP
ANION GAP SERPL CALC-SCNC: 11 MMOL/L — SIGNIFICANT CHANGE UP (ref 5–17)
BUN SERPL-MCNC: 8 MG/DL — SIGNIFICANT CHANGE UP (ref 7–23)
C TRACH RRNA SPEC QL NAA+PROBE: SIGNIFICANT CHANGE UP
CALCIUM SERPL-MCNC: 8.2 MG/DL — LOW (ref 8.4–10.5)
CHLORIDE SERPL-SCNC: 111 MMOL/L — HIGH (ref 96–108)
CO2 SERPL-SCNC: 20 MMOL/L — LOW (ref 22–31)
CREAT SERPL-MCNC: 1.01 MG/DL — SIGNIFICANT CHANGE UP (ref 0.5–1.3)
EGFR: 69 ML/MIN/1.73M2 — SIGNIFICANT CHANGE UP
ESTIMATED AVERAGE GLUCOSE: 120 MG/DL — HIGH (ref 68–114)
FERRITIN SERPL-MCNC: 131 NG/ML — SIGNIFICANT CHANGE UP (ref 15–150)
FOLATE SERPL-MCNC: 19.4 NG/ML — SIGNIFICANT CHANGE UP
GLUCOSE SERPL-MCNC: 116 MG/DL — HIGH (ref 70–99)
HCT VFR BLD CALC: 22.4 % — LOW (ref 34.5–45)
HGB BLD-MCNC: 7.3 G/DL — LOW (ref 11.5–15.5)
IRON SATN MFR SERPL: 15 UG/DL — LOW (ref 30–160)
IRON SATN MFR SERPL: 7 % — LOW (ref 14–50)
MCHC RBC-ENTMCNC: 23.2 PG — LOW (ref 27–34)
MCHC RBC-ENTMCNC: 32.6 GM/DL — SIGNIFICANT CHANGE UP (ref 32–36)
MCV RBC AUTO: 71.3 FL — LOW (ref 80–100)
N GONORRHOEA RRNA SPEC QL NAA+PROBE: SIGNIFICANT CHANGE UP
NRBC # BLD: 0 /100 WBCS — SIGNIFICANT CHANGE UP (ref 0–0)
NT-PROBNP SERPL-SCNC: 1557 PG/ML — HIGH (ref 0–300)
PLATELET # BLD AUTO: 171 K/UL — SIGNIFICANT CHANGE UP (ref 150–400)
POTASSIUM SERPL-MCNC: 4 MMOL/L — SIGNIFICANT CHANGE UP (ref 3.5–5.3)
POTASSIUM SERPL-SCNC: 4 MMOL/L — SIGNIFICANT CHANGE UP (ref 3.5–5.3)
RBC # BLD: 3.14 M/UL — LOW (ref 3.8–5.2)
RBC # FLD: 17.4 % — HIGH (ref 10.3–14.5)
SODIUM SERPL-SCNC: 142 MMOL/L — SIGNIFICANT CHANGE UP (ref 135–145)
SPECIMEN SOURCE: SIGNIFICANT CHANGE UP
TIBC SERPL-MCNC: 227 UG/DL — SIGNIFICANT CHANGE UP (ref 220–430)
TSH SERPL-MCNC: 3.26 UIU/ML — SIGNIFICANT CHANGE UP (ref 0.27–4.2)
UIBC SERPL-MCNC: 212 UG/DL — SIGNIFICANT CHANGE UP (ref 110–370)
VIT B12 SERPL-MCNC: 880 PG/ML — SIGNIFICANT CHANGE UP (ref 232–1245)
WBC # BLD: 8.52 K/UL — SIGNIFICANT CHANGE UP (ref 3.8–10.5)
WBC # FLD AUTO: 8.52 K/UL — SIGNIFICANT CHANGE UP (ref 3.8–10.5)

## 2024-06-04 PROCEDURE — 93306 TTE W/DOPPLER COMPLETE: CPT | Mod: 26

## 2024-06-04 PROCEDURE — 99232 SBSQ HOSP IP/OBS MODERATE 35: CPT

## 2024-06-04 PROCEDURE — 71045 X-RAY EXAM CHEST 1 VIEW: CPT | Mod: 26

## 2024-06-04 PROCEDURE — 93356 MYOCRD STRAIN IMG SPCKL TRCK: CPT

## 2024-06-04 RX ORDER — FAMOTIDINE 10 MG/ML
20 INJECTION INTRAVENOUS ONCE
Refills: 0 | Status: COMPLETED | OUTPATIENT
Start: 2024-06-04 | End: 2024-06-04

## 2024-06-04 RX ORDER — IPRATROPIUM/ALBUTEROL SULFATE 18-103MCG
3 AEROSOL WITH ADAPTER (GRAM) INHALATION ONCE
Refills: 0 | Status: COMPLETED | OUTPATIENT
Start: 2024-06-04 | End: 2024-06-04

## 2024-06-04 RX ORDER — SIMETHICONE 80 MG/1
80 TABLET, CHEWABLE ORAL
Refills: 0 | Status: DISCONTINUED | OUTPATIENT
Start: 2024-06-04 | End: 2024-06-07

## 2024-06-04 RX ORDER — FUROSEMIDE 40 MG
40 TABLET ORAL ONCE
Refills: 0 | Status: COMPLETED | OUTPATIENT
Start: 2024-06-04 | End: 2024-06-04

## 2024-06-04 RX ORDER — PANTOPRAZOLE SODIUM 20 MG/1
40 TABLET, DELAYED RELEASE ORAL EVERY 12 HOURS
Refills: 0 | Status: DISCONTINUED | OUTPATIENT
Start: 2024-06-04 | End: 2024-06-07

## 2024-06-04 RX ORDER — ONDANSETRON 8 MG/1
4 TABLET, FILM COATED ORAL ONCE
Refills: 0 | Status: COMPLETED | OUTPATIENT
Start: 2024-06-04 | End: 2024-06-04

## 2024-06-04 RX ADMIN — Medication 975 MILLIGRAM(S): at 05:37

## 2024-06-04 RX ADMIN — PANTOPRAZOLE SODIUM 40 MILLIGRAM(S): 20 TABLET, DELAYED RELEASE ORAL at 17:46

## 2024-06-04 RX ADMIN — Medication 975 MILLIGRAM(S): at 21:24

## 2024-06-04 RX ADMIN — Medication 3 MILLILITER(S): at 22:22

## 2024-06-04 RX ADMIN — Medication 325 MILLIGRAM(S): at 11:47

## 2024-06-04 RX ADMIN — MEDROXYPROGESTERONE ACETATE 10 MILLIGRAM(S): 150 INJECTION, SUSPENSION, EXTENDED RELEASE INTRAMUSCULAR at 11:47

## 2024-06-04 RX ADMIN — Medication 40 MILLIGRAM(S): at 12:56

## 2024-06-04 RX ADMIN — PIPERACILLIN AND TAZOBACTAM 25 GRAM(S): 4; .5 INJECTION, POWDER, LYOPHILIZED, FOR SOLUTION INTRAVENOUS at 22:21

## 2024-06-04 RX ADMIN — PANTOPRAZOLE SODIUM 40 MILLIGRAM(S): 20 TABLET, DELAYED RELEASE ORAL at 05:56

## 2024-06-04 RX ADMIN — Medication 975 MILLIGRAM(S): at 13:55

## 2024-06-04 RX ADMIN — Medication 200 MILLIGRAM(S): at 22:22

## 2024-06-04 RX ADMIN — Medication 200 MILLIGRAM(S): at 12:54

## 2024-06-04 RX ADMIN — FAMOTIDINE 20 MILLIGRAM(S): 10 INJECTION INTRAVENOUS at 06:45

## 2024-06-04 RX ADMIN — ONDANSETRON 4 MILLIGRAM(S): 8 TABLET, FILM COATED ORAL at 09:12

## 2024-06-04 RX ADMIN — ENOXAPARIN SODIUM 40 MILLIGRAM(S): 100 INJECTION SUBCUTANEOUS at 22:21

## 2024-06-04 RX ADMIN — PIPERACILLIN AND TAZOBACTAM 25 GRAM(S): 4; .5 INJECTION, POWDER, LYOPHILIZED, FOR SOLUTION INTRAVENOUS at 12:56

## 2024-06-04 RX ADMIN — Medication 975 MILLIGRAM(S): at 20:24

## 2024-06-04 RX ADMIN — Medication 975 MILLIGRAM(S): at 12:54

## 2024-06-04 RX ADMIN — Medication 975 MILLIGRAM(S): at 06:50

## 2024-06-04 RX ADMIN — PIPERACILLIN AND TAZOBACTAM 25 GRAM(S): 4; .5 INJECTION, POWDER, LYOPHILIZED, FOR SOLUTION INTRAVENOUS at 05:38

## 2024-06-04 RX ADMIN — Medication 200 MILLIGRAM(S): at 05:38

## 2024-06-04 NOTE — PROGRESS NOTE ADULT - PROBLEM SELECTOR PLAN 1
The patient is a 16y Male complaining of allergic reaction. blood cultures and urine culture stay negative  CT strongly suggestive of infection  ID help appreciated   will continue piperacillin/tazobactam   Gyn consultation appreciated  outpatient follow up with Dr Banerjee Gyn  patient had a PAP smear 2 weeks ago path negative   likely needs D&C for uterine endometrial thickening

## 2024-06-04 NOTE — CONSULT NOTE ADULT - SUBJECTIVE AND OBJECTIVE BOX
06-04-24 @ 15:02    Patient is a 49y old  Female who presents with a chief complaint of fever chills flank pain (04 Jun 2024 13:29)      HPI:   49y female with pmhx of GERD who presents to the Emergency Department with complaints of abdominal pain + dysuria. Contrary to triage note- patient vaginal bleeding from menstrual period. Patient complaining of fever + chills over the last 48-72 hours. Patient states the abdominal pain is non radiating and diffuse in nature. Patient also complaining of dysuria. Patient states she was recently started on levoflaxacin (only took one dose) however the pain acutely worsened and she presented to the Emergency Department  (03 Jun 2024 12:19)    per her  she used to have heavy vaginal bleeding and was prescribed some medication for two days which she finished few days ago : here she is with uti and has been coughing:  and feeling more sob:   -She used cough a lot and then her PCP gave her albuterol and she felt better with that  : she takes it very frequently: almost every day and she feels better with it:     ?FOLLOWING PRESENT  [ x] Hx of PE/DVT, [ x] Hx COPD, [? ] Hx of Asthma, [y ] Hx of Hospitalization, [ x]  Hx of BiPAP/CPAP use, [x ] Hx of DEBBIE    Allergies    No Known Allergies    Intolerances        PAST MEDICAL & SURGICAL HISTORY:  GERD (gastroesophageal reflux disease)      History of chronic constipation      S/P knee surgery          FAMILY HISTORY:  FH: throat cancer (Father)    Family history of chronic constipation (Mother)        Social History: [ x ] TOBACCO                  [ x ] ETOH                                 [ x ] IVDA/DRUGS    REVIEW OF SYSTEMS      General:	fever    Skin/Breast:x  	  Ophthalmologic:x  	  ENMT:	x    Respiratory and Thorax:  sob  	  Cardiovascular:	x    Gastrointestinal:	x    Genitourinary:	x    Musculoskeletal:	x    Neurological:	x    Psychiatric:	x    Hematology/Lymphatics:	x    Endocrine:	x    Allergic/Immunologic:	x    MEDICATIONS  (STANDING):  enoxaparin Injectable 40 milliGRAM(s) SubCutaneous every 24 hours  ferrous    sulfate 325 milliGRAM(s) Oral daily  medroxyPROGESTERone 10 milliGRAM(s) Oral daily  pantoprazole  Injectable 40 milliGRAM(s) IV Push every 12 hours  phenazopyridine 200 milliGRAM(s) Oral every 8 hours  piperacillin/tazobactam IVPB.. 3.375 Gram(s) IV Intermittent every 8 hours    MEDICATIONS  (PRN):  acetaminophen     Tablet .. 975 milliGRAM(s) Oral every 6 hours PRN Temp greater or equal to 38C (100.4F), Mild Pain (1 - 3)  polyethylene glycol 3350 17 Gram(s) Oral daily PRN Constipation  senna 2 Tablet(s) Oral at bedtime PRN Constipation  simethicone 80 milliGRAM(s) Chew two times a day PRN Gas       Vital Signs Last 24 Hrs  T(C): 37 (04 Jun 2024 11:59), Max: 38.1 (03 Jun 2024 20:30)  T(F): 98.6 (04 Jun 2024 11:59), Max: 100.6 (03 Jun 2024 20:30)  HR: 80 (04 Jun 2024 11:59) (71 - 110)  BP: 143/66 (04 Jun 2024 11:59) (91/60 - 149/68)  BP(mean): --  RR: 18 (04 Jun 2024 11:59) (16 - 19)  SpO2: 95% (04 Jun 2024 11:59) (91% - 97%)    Parameters below as of 04 Jun 2024 11:59  Patient On (Oxygen Delivery Method): room air  O2 Flow (L/min): 37  Orthostatic VS          I&O's Summary    03 Jun 2024 07:01  -  04 Jun 2024 07:00  --------------------------------------------------------  IN: 1800 mL / OUT: 0 mL / NET: 1800 mL        Physical Exam:   GENERAL: obese  HEENT: CHIOMA/   Atraumatic, Normocephalic  ENMT: No tonsillar erythema, exudates, or enlargement; Moist mucous membranes, Good dentition, No lesions  NECK: Supple, No JVD, Normal thyroid  CHEST/LUNG: Clear to auscultation bilaterally  CVS: Regular rate and rhythm; No murmurs, rubs, or gallops  GI: : Soft, Nontender, Nondistended; Bowel sounds present  NERVOUS SYSTEM:  Alert & Oriented X3  EXTREMITIES: - edema  LYMPH: No lymphadenopathy noted  SKIN: No rashes or lesions  ENDOCRINOLOGY: No Thyromegaly  PSYCH: Appropriate    Labs:  UNKNOWN<37<4>>55<<7.405>>Venous<<3><<4><<5<<559>>                            7.3    8.52  )-----------( 171      ( 04 Jun 2024 09:44 )             22.4                         7.7    8.46  )-----------( 148      ( 03 Jun 2024 06:31 )             24.0                         7.4    8.09  )-----------( 155      ( 03 Jun 2024 05:00 )             22.9                         9.8    13.18 )-----------( 256      ( 01 Jun 2024 23:00 )             30.2     06-04    142  |  111<H>  |  8   ----------------------------<  116<H>  4.0   |  20<L>  |  1.01  06-03    141  |  110<H>  |  7   ----------------------------<  107<H>  3.4<L>   |  19<L>  |  0.88  06-01    139  |  103  |  10  ----------------------------<  130<H>  3.8   |  22  |  1.03    Ca    8.2<L>      04 Jun 2024 09:44  Ca    8.1<L>      03 Jun 2024 13:50    TPro  7.8  /  Alb  4.0  /  TBili  0.4  /  DBili  x   /  AST  18  /  ALT  16  /  AlkPhos  53  06-01    CAPILLARY BLOOD GLUCOSE            Urinalysis Basic - ( 04 Jun 2024 09:44 )    Color: x / Appearance: x / SG: x / pH: x  Gluc: 116 mg/dL / Ketone: x  / Bili: x / Urobili: x   Blood: x / Protein: x / Nitrite: x   Leuk Esterase: x / RBC: x / WBC x   Sq Epi: x / Non Sq Epi: x / Bacteria: x      Culture - Blood (collected 02 Jun 2024 18:45)  Source: .Blood Blood  Preliminary Report (03 Jun 2024 22:02):    No growth at 24 hours    Culture - Blood (collected 02 Jun 2024 18:35)  Source: .Blood Blood  Preliminary Report (03 Jun 2024 22:02):    No growth at 24 hours    Culture - Urine (collected 01 Jun 2024 23:25)  Source: Clean Catch Clean Catch (Midstream)  Final Report (03 Jun 2024 07:06):    No growth      D DImer  Procalcitonin: 1.27 ng/mL (06-01 @ 23:00)      Studies  Chest X-RAY  CT SCAN Chest   CT Abdomen  Venous Dopplers: LE:   Others          rd< from: Xray Chest 2 Views PA/Lat (06.01.24 @ 23:09) >  ACC: [id number] EXAM:  XR CHEST PA LAT 2V   ORDERED BY: JEYSON BOWENS     PROCEDURE DATE:  06/01/2024          INTERPRETATION:  CLINICAL INDICATION: malaise, eval acute process    TECHNIQUE: Frontal and lateral views of the chest were obtained.    COMPARISON: Chest x-ray 6/1/2023.    FINDINGS:  The heart size is normal.  No focal consolidation. Normal pulmonary vasculature  No pleural effusion.  No pneumothorax.  No acute osseous abnormalities.    IMPRESSION:  No focal consolidation.    --- End ofReport ---          JENNIE HOLLOWAY MD; Resident Radiologist  This document has been electronically signed.  THIAGO TERRY DO; Attending Radiologist  This document has been electronically signed. Jun 2 2024  9:43AM    < end of copied text >

## 2024-06-04 NOTE — PROVIDER CONTACT NOTE (OTHER) - ASSESSMENT
Pt feeling unwell, requested medication for abdominal discomfort, pt afebrile currently (temperature creeps upwards associated with chills), pt displaying chills and is using hot packs and warm blankets to warm up

## 2024-06-04 NOTE — PROGRESS NOTE ADULT - PROBLEM SELECTOR PROBLEM 5
CCTA form completed, signed by VR, and faxed to [phone]. Receipt confirmed. Form sent to scanning.      History of chronic constipation

## 2024-06-04 NOTE — PROGRESS NOTE ADULT - PROBLEM SELECTOR PLAN 2
new today  pro-BNP elevated ~ 1500  CXR appears with some congestive changes (my read)  furosemide IV x 1  echocardiogram ordered for LV function

## 2024-06-04 NOTE — CONSULT NOTE ADULT - ASSESSMENT
49y female with pmhx of GERD who presents to the Emergency Department with complaints of abdominal pain + dysuria. Contrary to triage note- patient vaginal bleeding from menstrual period. Patient complaining of fever + chills over the last 48-72 hours. Patient states the abdominal pain is non radiating and diffuse in nature. Patient also complaining of dysuria. Patient states she was recently started on levoflaxacin (only took one dose) however the pain acutely worsened and she presented to the Emergency Department  (03 Jun 2024 12:19)  per her  she used to have heavy vaginal bleeding and was prescribed some medication for two days which she finished few days ago : here she is with uti and has been coughing:  and feeling more sob:   -She used cough a lot and then her PCP gave her albuterol and she felt better with that  : she takes it very frequently: almost every day     Complicated uti :  SOB;  GERD:         Complicated uti :  -being followed by ID:  SOB;  --she is complaining of sob for some time:   -now she feels more sob:   -she is on hormonal therapy for excessive vaginal bleeding:  no hx of pe or dvt:   -has had three c sections with no complications:  -she is mildly hypoxic too :  -would do cta   -dc hormonal therapy for now  -she also have have asthma as is using albuterol very frequently in night time: : would add Symbicort  -outpt PFT    GERD:  -on ppi:     cont dvt prophylaxis    called acp :              
49 year old woman with GERD, constipation with previous UTIs admitted 6/3/24 with vaginal bleeding, dysuria, fever, leukocytosis - now resolved, pyuria, cystitis, right ureteritis, mild right hydroureteronephrosis    complicated UTI  h/o UTIs    PT appears to be perimenopausal  - topical vaginal estrogen may be useful if vaginal mucosal atrophy noted.    Antibiotics  Levolfoxacin x1 about /1   DOxy 6/2--> 6/3  Ceftriaxone 6/2   Cefpodoxime 6/2  6/3  Zosyn 6/3-->    Suggest   I agree with Zosyn  Stop Doxy/ Cefpodoxime  I will monitor culture results and clinical course    discussed with primary medical attending
A/P: 49y  LMP  p/w abdominal/back pain x5d and fevers/chills x2-3d in setting of dysuria and hematuria. GYN consulted due to L adnexal structure on CT A/P c/f possible hydrosalpinx vs pyosalpinx. Pt afebrile, tachycardic to 112, normotensive. Labs notable for leukocytosis with WBC 13.1, anemia with H/H 9.8/30.2, UA with large LE and WBC. Physical exam notable for L CVA tenderness and suprapubic tenderness, no adnexal tenderness or cervical motion tenderness noted. CT A/P demonstrating b/l pyelonephritis without renal abscess, mild R ureteritis/cystitis, myomatous uterus with endometrial prominence. TVUS demonstrating endometrial thickening up to 25mm and L adnexal tubular cystic structure, 1.5x0.8cm, hydrosalpinx vs pyosalpinx vs alternate etiology. Pt's clinical presentation consistent with UTI/pyelonephritis, low suspicion for PID given lack of CMT/adnexal tenderness on exam.    -Agree with ED plan for observation in CDU for Cefpodoxime and Doxycycline for UTI/pyelonephritis, which would encompass coverage for pelvic inflammatory disease per CDC guidelines  -F/u UCx ()  -Recommend STI panel  -Recommend d/c on PO Fe/Vit C for anemia  -Pt will require outpatient f/u for endometrial sampling (s/p failed endometrial biopsy in office ) in setting of thickened endometrium with prolonged/heavy menses. Will coordinate booking clinic appt for HSC/D+C. Pt will also require 3mo f/u TVUS to assess for interval resolution of L adnexal structure  -Discussed recommendations with pt at bedside  -Rest of management as per ED team  -Please contact GYN with any additional questions or concerns at #[phone]    D/w Dr. Ponce, GYN service attending  [facility] PGY2

## 2024-06-05 DIAGNOSIS — I50.31 ACUTE DIASTOLIC (CONGESTIVE) HEART FAILURE: ICD-10-CM

## 2024-06-05 DIAGNOSIS — N92.0 EXCESSIVE AND FREQUENT MENSTRUATION WITH REGULAR CYCLE: ICD-10-CM

## 2024-06-05 DIAGNOSIS — D64.9 ANEMIA, UNSPECIFIED: ICD-10-CM

## 2024-06-05 DIAGNOSIS — N92.1 EXCESSIVE AND FREQUENT MENSTRUATION WITH IRREGULAR CYCLE: ICD-10-CM

## 2024-06-05 LAB
ANION GAP SERPL CALC-SCNC: 13 MMOL/L — SIGNIFICANT CHANGE UP (ref 5–17)
BILIRUB SERPL-MCNC: 0.4 MG/DL — SIGNIFICANT CHANGE UP (ref 0.2–1.2)
BLD GP AB SCN SERPL QL: NEGATIVE — SIGNIFICANT CHANGE UP
BUN SERPL-MCNC: 9 MG/DL — SIGNIFICANT CHANGE UP (ref 7–23)
CALCIUM SERPL-MCNC: 8.2 MG/DL — LOW (ref 8.4–10.5)
CHLORIDE SERPL-SCNC: 107 MMOL/L — SIGNIFICANT CHANGE UP (ref 96–108)
CO2 SERPL-SCNC: 20 MMOL/L — LOW (ref 22–31)
CREAT SERPL-MCNC: 1.05 MG/DL — SIGNIFICANT CHANGE UP (ref 0.5–1.3)
D DIMER BLD IA.RAPID-MCNC: 973 NG/ML DDU — HIGH
EGFR: 66 ML/MIN/1.73M2 — SIGNIFICANT CHANGE UP
GLUCOSE SERPL-MCNC: 101 MG/DL — HIGH (ref 70–99)
HCT VFR BLD CALC: 20.6 % — CRITICAL LOW (ref 34.5–45)
HCT VFR BLD CALC: 21.1 % — LOW (ref 34.5–45)
HCT VFR BLD CALC: 25.5 % — LOW (ref 34.5–45)
HGB BLD-MCNC: 6.7 G/DL — CRITICAL LOW (ref 11.5–15.5)
HGB BLD-MCNC: 6.9 G/DL — CRITICAL LOW (ref 11.5–15.5)
HGB BLD-MCNC: 8.3 G/DL — LOW (ref 11.5–15.5)
INR BLD: 1.31 RATIO — HIGH (ref 0.85–1.18)
LACTATE SERPL-SCNC: 1.3 MMOL/L — SIGNIFICANT CHANGE UP (ref 0.5–2)
LACTATE SERPL-SCNC: 2.5 MMOL/L — HIGH (ref 0.5–2)
LACTATE SERPL-SCNC: 3.4 MMOL/L — HIGH (ref 0.5–2)
MCHC RBC-ENTMCNC: 23.1 PG — LOW (ref 27–34)
MCHC RBC-ENTMCNC: 23.2 PG — LOW (ref 27–34)
MCHC RBC-ENTMCNC: 23.5 PG — LOW (ref 27–34)
MCHC RBC-ENTMCNC: 32.5 GM/DL — SIGNIFICANT CHANGE UP (ref 32–36)
MCHC RBC-ENTMCNC: 32.5 GM/DL — SIGNIFICANT CHANGE UP (ref 32–36)
MCHC RBC-ENTMCNC: 32.7 GM/DL — SIGNIFICANT CHANGE UP (ref 32–36)
MCV RBC AUTO: 70.6 FL — LOW (ref 80–100)
MCV RBC AUTO: 71.3 FL — LOW (ref 80–100)
MCV RBC AUTO: 72.2 FL — LOW (ref 80–100)
MELD SCORE WITH DIALYSIS: 23 POINTS — SIGNIFICANT CHANGE UP
MELD SCORE WITHOUT DIALYSIS: 10 POINTS — SIGNIFICANT CHANGE UP
NRBC # BLD: 0 /100 WBCS — SIGNIFICANT CHANGE UP (ref 0–0)
PLATELET # BLD AUTO: 212 K/UL — SIGNIFICANT CHANGE UP (ref 150–400)
PLATELET # BLD AUTO: 215 K/UL — SIGNIFICANT CHANGE UP (ref 150–400)
PLATELET # BLD AUTO: 249 K/UL — SIGNIFICANT CHANGE UP (ref 150–400)
POTASSIUM SERPL-MCNC: 3.2 MMOL/L — LOW (ref 3.5–5.3)
POTASSIUM SERPL-SCNC: 3.2 MMOL/L — LOW (ref 3.5–5.3)
PROTHROM AB SERPL-ACNC: 13.6 SEC — HIGH (ref 9.5–13)
RBC # BLD: 2.89 M/UL — LOW (ref 3.8–5.2)
RBC # BLD: 2.99 M/UL — LOW (ref 3.8–5.2)
RBC # BLD: 3.53 M/UL — LOW (ref 3.8–5.2)
RBC # FLD: 17.4 % — HIGH (ref 10.3–14.5)
RBC # FLD: 17.6 % — HIGH (ref 10.3–14.5)
RBC # FLD: 18.4 % — HIGH (ref 10.3–14.5)
RH IG SCN BLD-IMP: POSITIVE — SIGNIFICANT CHANGE UP
RH IG SCN BLD-IMP: POSITIVE — SIGNIFICANT CHANGE UP
SARS-COV-2 RNA SPEC QL NAA+PROBE: SIGNIFICANT CHANGE UP
SODIUM SERPL-SCNC: 140 MMOL/L — SIGNIFICANT CHANGE UP (ref 135–145)
WBC # BLD: 7.04 K/UL — SIGNIFICANT CHANGE UP (ref 3.8–10.5)
WBC # BLD: 8.14 K/UL — SIGNIFICANT CHANGE UP (ref 3.8–10.5)
WBC # BLD: 8.28 K/UL — SIGNIFICANT CHANGE UP (ref 3.8–10.5)
WBC # FLD AUTO: 7.04 K/UL — SIGNIFICANT CHANGE UP (ref 3.8–10.5)
WBC # FLD AUTO: 8.14 K/UL — SIGNIFICANT CHANGE UP (ref 3.8–10.5)
WBC # FLD AUTO: 8.28 K/UL — SIGNIFICANT CHANGE UP (ref 3.8–10.5)

## 2024-06-05 PROCEDURE — 71275 CT ANGIOGRAPHY CHEST: CPT | Mod: 26

## 2024-06-05 PROCEDURE — 93010 ELECTROCARDIOGRAM REPORT: CPT

## 2024-06-05 PROCEDURE — 99232 SBSQ HOSP IP/OBS MODERATE 35: CPT

## 2024-06-05 RX ORDER — IPRATROPIUM/ALBUTEROL SULFATE 18-103MCG
3 AEROSOL WITH ADAPTER (GRAM) INHALATION ONCE
Refills: 0 | Status: COMPLETED | OUTPATIENT
Start: 2024-06-05 | End: 2024-06-05

## 2024-06-05 RX ORDER — POTASSIUM CHLORIDE 20 MEQ
40 PACKET (EA) ORAL ONCE
Refills: 0 | Status: DISCONTINUED | OUTPATIENT
Start: 2024-06-05 | End: 2024-06-07

## 2024-06-05 RX ORDER — FUROSEMIDE 40 MG
40 TABLET ORAL DAILY
Refills: 0 | Status: DISCONTINUED | OUTPATIENT
Start: 2024-06-05 | End: 2024-06-07

## 2024-06-05 RX ORDER — POTASSIUM CHLORIDE 20 MEQ
40 PACKET (EA) ORAL ONCE
Refills: 0 | Status: COMPLETED | OUTPATIENT
Start: 2024-06-05 | End: 2024-06-05

## 2024-06-05 RX ADMIN — PANTOPRAZOLE SODIUM 40 MILLIGRAM(S): 20 TABLET, DELAYED RELEASE ORAL at 05:39

## 2024-06-05 RX ADMIN — Medication 40 MILLIGRAM(S): at 16:03

## 2024-06-05 RX ADMIN — SIMETHICONE 80 MILLIGRAM(S): 80 TABLET, CHEWABLE ORAL at 18:27

## 2024-06-05 RX ADMIN — Medication 975 MILLIGRAM(S): at 05:39

## 2024-06-05 RX ADMIN — PANTOPRAZOLE SODIUM 40 MILLIGRAM(S): 20 TABLET, DELAYED RELEASE ORAL at 18:29

## 2024-06-05 RX ADMIN — PIPERACILLIN AND TAZOBACTAM 25 GRAM(S): 4; .5 INJECTION, POWDER, LYOPHILIZED, FOR SOLUTION INTRAVENOUS at 21:11

## 2024-06-05 RX ADMIN — Medication 975 MILLIGRAM(S): at 06:39

## 2024-06-05 RX ADMIN — ENOXAPARIN SODIUM 40 MILLIGRAM(S): 100 INJECTION SUBCUTANEOUS at 21:11

## 2024-06-05 RX ADMIN — PIPERACILLIN AND TAZOBACTAM 25 GRAM(S): 4; .5 INJECTION, POWDER, LYOPHILIZED, FOR SOLUTION INTRAVENOUS at 13:06

## 2024-06-05 RX ADMIN — PIPERACILLIN AND TAZOBACTAM 25 GRAM(S): 4; .5 INJECTION, POWDER, LYOPHILIZED, FOR SOLUTION INTRAVENOUS at 05:38

## 2024-06-05 RX ADMIN — Medication 325 MILLIGRAM(S): at 13:06

## 2024-06-05 RX ADMIN — Medication 40 MILLIEQUIVALENT(S): at 13:06

## 2024-06-05 RX ADMIN — Medication 975 MILLIGRAM(S): at 16:03

## 2024-06-05 RX ADMIN — Medication 3 MILLILITER(S): at 07:45

## 2024-06-05 NOTE — CHART NOTE - NSCHARTNOTEFT_GEN_A_CORE
GYN pathology reviewed. Chlamydia and GC not detected. Please contact GYN with any additional questions or concerns at #[phone]    JED Limon, PGY-1

## 2024-06-05 NOTE — PROVIDER CONTACT NOTE (OTHER) - ACTION/TREATMENT ORDERED:
Covid swab sent, ekg done normal sinus rhythm, 2 L nasal cannula placed, x1 albuterol nebulizer treatment, d-dimer & lactate ordered
pepcid 20 mg iv push ordered and administered

## 2024-06-05 NOTE — PROGRESS NOTE ADULT - PROBLEM SELECTOR PLAN 1
resolved   likely precipitated by aggressive hydration in Emergency Department for severe UTI  CTA negative PE but consistent with acute on chronic diastolic heart failure   start po furosemide   echocardiogram normal blood cultures and urine culture stay negative  CT strongly suggestive of infection  ID help appreciated   will continue piperacillin/tazobactam   Gyn consultation appreciated  outpatient follow up with Dr Banerjee Gyn  patient had a PAP smear 2 weeks ago path negative   likely needs D&C for uterine endometrial thickening

## 2024-06-05 NOTE — PROVIDER CONTACT NOTE (OTHER) - SITUATION
Pt c/o of abdominal discomfort, and had emesis x2 of yellow bile
Patient tachycardic up to 114, noticeably SOB hypoxic on room air in low 80's, audibly wheezing, shaking, and c/o generalized body pain.

## 2024-06-05 NOTE — PROGRESS NOTE ADULT - PROBLEM SELECTOR PLAN 2
blood cultures and urine culture stay negative  CT strongly suggestive of infection  ID help appreciated   will continue piperacillin/tazobactam   Gyn consultation appreciated  outpatient follow up with Dr Banerjee Gyn  patient had a PAP smear 2 weeks ago path negative   likely needs D&C for uterine endometrial thickening resolved   likely precipitated by aggressive hydration in Emergency Department for severe UTI  CTA negative PE but consistent with acute on chronic diastolic heart failure   start po furosemide   echocardiogram normal

## 2024-06-05 NOTE — PROGRESS NOTE ADULT - PROBLEM SELECTOR PLAN 3
resolving  see above likely secondary to vaginal blood loss in the past combined with hydration dilutional drop  will transfuse x 1 unit roxie since he has CHF as well  Lasix started  continue iron

## 2024-06-05 NOTE — CHART NOTE - NSCHARTNOTEFT_GEN_A_CORE
Notified by RN pt is hypoxic and tachycardia  to the 80%s room air . Pt seen and examined at bedside.  Pt lying comfortably in NAD able to speak full sentences. Placed on 2L O2 sat improved to 95%. Currently at baseline AAOx2-3, denies HA, CP, palp, cough, N/V/D but endorsed abdominal pain and sob.  Does not appear anxious. RR WNL; increased work of breathing, no accessory muscle use     Vital Signs Last 24 Hrs  T(C): 37.1 (05 Jun 2024 06:33), Max: 37.5 (04 Jun 2024 09:30)  T(F): 98.8 (05 Jun 2024 06:33), Max: 99.5 (04 Jun 2024 09:30)  HR: 114 (05 Jun 2024 06:33) (80 - 114)  BP: 114/64 (05 Jun 2024 06:33) (93/60 - 143/66)  BP(mean): --  RR: 18 (05 Jun 2024 06:35) (18 - 22)  SpO2: 92% (05 Jun 2024 06:35) (82% - 95%)    Parameters below as of 05 Jun 2024 06:35  Patient On (Oxygen Delivery Method): nasal cannula  O2 Flow (L/min): 2.5        PHYSICAL EXAM:  General- lying comfortably in bed in NAD  Cardiac- normal S1, S2, RRR, no JVD  Resp- no wheezing rales or rhonchi.  Abdomen- soft non-tender, non-distended +BS  Extremities- trace edema bilaterally on LE.        Plan:  # Mild Hypoxia  - O2 2L , pt currently saturating 94%, Will try to wean off as tolerated  - Duoneb inhaler  stat ordered   - COVID-PCR ordered   - No obvious signs of bleeding  - CTA to r/o PE ( ordered prior )   - Defer CXR for bambi   - EKG shows no aute ST/T wave abnormalities   - Transfer to [facility] for continuous pulse ox monitoring      Laurie Mckeon NP  Department of Medicine   Spectra [phone] Notified by RN pt is hypoxic and tachycardia  to the 80%s room air . Pt seen and examined at bedside.  Pt lying comfortably in NAD able to speak full sentences. Placed on 2L O2 sat improved to 95%. Currently at baseline AAOx2-3, denies HA, CP, palp, cough, N/V/D but endorsed abdominal pain and sob.  Does not appear anxious. RR WNL; increased work of breathing, no accessory muscle use.    Vital Signs Last 24 Hrs  T(C): 37.1 (05 Jun 2024 06:33), Max: 37.5 (04 Jun 2024 09:30)  T(F): 98.8 (05 Jun 2024 06:33), Max: 99.5 (04 Jun 2024 09:30)  HR: 114 (05 Jun 2024 06:33) (80 - 114)  BP: 114/64 (05 Jun 2024 06:33) (93/60 - 143/66)  BP(mean): --  RR: 18 (05 Jun 2024 06:35) (18 - 22)  SpO2: 92% (05 Jun 2024 06:35) (82% - 95%)    Parameters below as of 05 Jun 2024 06:35  Patient On (Oxygen Delivery Method): nasal cannula  O2 Flow (L/min): 2.5      PHYSICAL EXAM:  General- lying comfortably in bed in NAD  Cardiac- normal S1, S2, RRR, no JVD  Resp- no wheezing rales or rhonchi.  Abdomen- soft non-tender, non-distended +BS  Extremities- trace edema bilaterally on LE.        Plan:  # Mild Hypoxia  - O2 2L , pt currently saturating 94%, Will try to wean off as tolerated  - Duoneb inhaler  stat ordered   - COVID-PCR ordered ; D-dimer added to AM labs   - No obvious signs of bleeding  - CTA to r/o PE ( ordered prior )   - Defer CXR for now   - EKG shows no acute  ST/T wave abnormalities   - Continue with IV Zosyn Q8 hours   - Continue with Lovenox 40mg Q12 hrs  - Continue with supportive care       Laurie Mckeon NP  Department of Medicine   Spectra [phone] Notified by RN pt is hypoxic and tachycardia  to the 80%s room air . Pt seen and examined at bedside.  Pt lying comfortably in NAD able to speak full sentences. Placed on 2L O2 sat improved to 95%. Currently at baseline AAOx2-3, denies HA, CP, palp, cough, N/V/D but endorsed abdominal pain and sob.  Does not appear anxious. RR WNL; increased work of breathing, no accessory muscle use.    Vital Signs Last 24 Hrs  T(C): 37.1 (05 Jun 2024 06:33), Max: 37.5 (04 Jun 2024 09:30)  T(F): 98.8 (05 Jun 2024 06:33), Max: 99.5 (04 Jun 2024 09:30)  HR: 114 (05 Jun 2024 06:33) (80 - 114)  BP: 114/64 (05 Jun 2024 06:33) (93/60 - 143/66)  BP(mean): --  RR: 18 (05 Jun 2024 06:35) (18 - 22)  SpO2: 92% (05 Jun 2024 06:35) (82% - 95%)    Parameters below as of 05 Jun 2024 06:35  Patient On (Oxygen Delivery Method): nasal cannula  O2 Flow (L/min): 2.5      PHYSICAL EXAM:  General- lying comfortably in bed in NAD  Cardiac- normal S1, S2, RRR, no JVD  Resp- no wheezing rales or rhonchi.  Abdomen- soft non-tender, non-distended +BS  Extremities- trace edema bilaterally on LE.        Plan:  # Mild Hypoxia  - O2 2L , pt currently saturating 94%, Will try to wean off as tolerated  - Duoneb inhaler  stat ordered   - COVID-PCR  ; D-dimer, serum lactate  added to AM labs   - No obvious signs of bleeding  - CTA to r/o PE ( ordered prior )   - Defer CXR for now   - EKG shows no acute  ST/T wave abnormalities   - Continue with IV Zosyn 3.375mg  Q8 hours   - Continue with Lovenox 40mg Q12 hrs  - Continue with supportive care       Laurie Mckeon NP  Department of Medicine   Spectra [phone]

## 2024-06-05 NOTE — PROVIDER CONTACT NOTE (OTHER) - BACKGROUND
Pt admitted for abdominal pain / uti
49y female with pmhx of GERD, previous UTIs, Who presents to the ED with complaints of abdominal pain + dysuria

## 2024-06-06 LAB
ANION GAP SERPL CALC-SCNC: 14 MMOL/L — SIGNIFICANT CHANGE UP (ref 5–17)
BUN SERPL-MCNC: 8 MG/DL — SIGNIFICANT CHANGE UP (ref 7–23)
CALCIUM SERPL-MCNC: 9 MG/DL — SIGNIFICANT CHANGE UP (ref 8.4–10.5)
CHLORIDE SERPL-SCNC: 106 MMOL/L — SIGNIFICANT CHANGE UP (ref 96–108)
CO2 SERPL-SCNC: 21 MMOL/L — LOW (ref 22–31)
CREAT SERPL-MCNC: 0.89 MG/DL — SIGNIFICANT CHANGE UP (ref 0.5–1.3)
EGFR: 80 ML/MIN/1.73M2 — SIGNIFICANT CHANGE UP
GLUCOSE SERPL-MCNC: 104 MG/DL — HIGH (ref 70–99)
HCT VFR BLD CALC: 24.6 % — LOW (ref 34.5–45)
HGB BLD-MCNC: 8.1 G/DL — LOW (ref 11.5–15.5)
LACTATE SERPL-SCNC: 1.6 MMOL/L — SIGNIFICANT CHANGE UP (ref 0.5–2)
MCHC RBC-ENTMCNC: 23.5 PG — LOW (ref 27–34)
MCHC RBC-ENTMCNC: 32.9 GM/DL — SIGNIFICANT CHANGE UP (ref 32–36)
MCV RBC AUTO: 71.3 FL — LOW (ref 80–100)
NRBC # BLD: 0 /100 WBCS — SIGNIFICANT CHANGE UP (ref 0–0)
PLATELET # BLD AUTO: 255 K/UL — SIGNIFICANT CHANGE UP (ref 150–400)
POTASSIUM SERPL-MCNC: 3.8 MMOL/L — SIGNIFICANT CHANGE UP (ref 3.5–5.3)
POTASSIUM SERPL-SCNC: 3.8 MMOL/L — SIGNIFICANT CHANGE UP (ref 3.5–5.3)
RBC # BLD: 3.45 M/UL — LOW (ref 3.8–5.2)
RBC # FLD: 18 % — HIGH (ref 10.3–14.5)
SODIUM SERPL-SCNC: 141 MMOL/L — SIGNIFICANT CHANGE UP (ref 135–145)
WBC # BLD: 8.37 K/UL — SIGNIFICANT CHANGE UP (ref 3.8–10.5)
WBC # FLD AUTO: 8.37 K/UL — SIGNIFICANT CHANGE UP (ref 3.8–10.5)

## 2024-06-06 PROCEDURE — 99232 SBSQ HOSP IP/OBS MODERATE 35: CPT

## 2024-06-06 RX ORDER — IRON SUCROSE 20 MG/ML
200 INJECTION, SOLUTION INTRAVENOUS ONCE
Refills: 0 | Status: COMPLETED | OUTPATIENT
Start: 2024-06-06 | End: 2024-06-06

## 2024-06-06 RX ORDER — ASCORBIC ACID 60 MG
500 TABLET,CHEWABLE ORAL DAILY
Refills: 0 | Status: DISCONTINUED | OUTPATIENT
Start: 2024-06-06 | End: 2024-06-07

## 2024-06-06 RX ADMIN — IRON SUCROSE 200 MILLIGRAM(S): 20 INJECTION, SOLUTION INTRAVENOUS at 17:25

## 2024-06-06 RX ADMIN — PIPERACILLIN AND TAZOBACTAM 25 GRAM(S): 4; .5 INJECTION, POWDER, LYOPHILIZED, FOR SOLUTION INTRAVENOUS at 12:56

## 2024-06-06 RX ADMIN — PANTOPRAZOLE SODIUM 40 MILLIGRAM(S): 20 TABLET, DELAYED RELEASE ORAL at 06:05

## 2024-06-06 RX ADMIN — ENOXAPARIN SODIUM 40 MILLIGRAM(S): 100 INJECTION SUBCUTANEOUS at 22:02

## 2024-06-06 RX ADMIN — PIPERACILLIN AND TAZOBACTAM 25 GRAM(S): 4; .5 INJECTION, POWDER, LYOPHILIZED, FOR SOLUTION INTRAVENOUS at 06:05

## 2024-06-06 RX ADMIN — Medication 325 MILLIGRAM(S): at 12:53

## 2024-06-06 RX ADMIN — PANTOPRAZOLE SODIUM 40 MILLIGRAM(S): 20 TABLET, DELAYED RELEASE ORAL at 17:25

## 2024-06-06 RX ADMIN — Medication 40 MILLIGRAM(S): at 06:05

## 2024-06-06 RX ADMIN — Medication 500 MILLIGRAM(S): at 17:25

## 2024-06-06 NOTE — PROGRESS NOTE ADULT - PROBLEM SELECTOR PLAN 1
ID follow up appreciated   will continue piperacillin/tazobactam thru tomorrow   Gyn follow up appreciated  ni evidence of PID  outpatient follow up with Gyn Dr Banerjee   likely needs D&C for uterine endometrial thickening  patient and daughter educated in detail re need for follow up

## 2024-06-06 NOTE — PROGRESS NOTE ADULT - PROBLEM SELECTOR PLAN 3
likely secondary to vaginal blood loss in the past combined with hydration dilutional drop  IV iron today  x 1  hemoglobin improved   start Vitamin C with oral iron

## 2024-06-07 ENCOUNTER — TRANSCRIPTION ENCOUNTER (OUTPATIENT)
Age: 49
End: 2024-06-07

## 2024-06-07 VITALS
OXYGEN SATURATION: 97 % | TEMPERATURE: 99 F | HEART RATE: 71 BPM | DIASTOLIC BLOOD PRESSURE: 80 MMHG | RESPIRATION RATE: 18 BRPM | SYSTOLIC BLOOD PRESSURE: 125 MMHG

## 2024-06-07 LAB
CULTURE RESULTS: SIGNIFICANT CHANGE UP
CULTURE RESULTS: SIGNIFICANT CHANGE UP
SPECIMEN SOURCE: SIGNIFICANT CHANGE UP
SPECIMEN SOURCE: SIGNIFICANT CHANGE UP

## 2024-06-07 PROCEDURE — 85018 HEMOGLOBIN: CPT

## 2024-06-07 PROCEDURE — 82746 ASSAY OF FOLIC ACID SERUM: CPT

## 2024-06-07 PROCEDURE — 84132 ASSAY OF SERUM POTASSIUM: CPT

## 2024-06-07 PROCEDURE — 85014 HEMATOCRIT: CPT

## 2024-06-07 PROCEDURE — 82435 ASSAY OF BLOOD CHLORIDE: CPT

## 2024-06-07 PROCEDURE — 87086 URINE CULTURE/COLONY COUNT: CPT

## 2024-06-07 PROCEDURE — 80053 COMPREHEN METABOLIC PANEL: CPT

## 2024-06-07 PROCEDURE — 99232 SBSQ HOSP IP/OBS MODERATE 35: CPT

## 2024-06-07 PROCEDURE — 87635 SARS-COV-2 COVID-19 AMP PRB: CPT

## 2024-06-07 PROCEDURE — 85045 AUTOMATED RETICULOCYTE COUNT: CPT

## 2024-06-07 PROCEDURE — 74177 CT ABD & PELVIS W/CONTRAST: CPT | Mod: MC

## 2024-06-07 PROCEDURE — 76830 TRANSVAGINAL US NON-OB: CPT

## 2024-06-07 PROCEDURE — 36430 TRANSFUSION BLD/BLD COMPNT: CPT

## 2024-06-07 PROCEDURE — 80048 BASIC METABOLIC PNL TOTAL CA: CPT

## 2024-06-07 PROCEDURE — 83690 ASSAY OF LIPASE: CPT

## 2024-06-07 PROCEDURE — 71045 X-RAY EXAM CHEST 1 VIEW: CPT

## 2024-06-07 PROCEDURE — 86923 COMPATIBILITY TEST ELECTRIC: CPT

## 2024-06-07 PROCEDURE — 97162 PT EVAL MOD COMPLEX 30 MIN: CPT

## 2024-06-07 PROCEDURE — 83540 ASSAY OF IRON: CPT

## 2024-06-07 PROCEDURE — 83605 ASSAY OF LACTIC ACID: CPT

## 2024-06-07 PROCEDURE — 82330 ASSAY OF CALCIUM: CPT

## 2024-06-07 PROCEDURE — 83735 ASSAY OF MAGNESIUM: CPT

## 2024-06-07 PROCEDURE — 87591 N.GONORRHOEAE DNA AMP PROB: CPT

## 2024-06-07 PROCEDURE — 82947 ASSAY GLUCOSE BLOOD QUANT: CPT

## 2024-06-07 PROCEDURE — 71275 CT ANGIOGRAPHY CHEST: CPT | Mod: MC

## 2024-06-07 PROCEDURE — 87040 BLOOD CULTURE FOR BACTERIA: CPT

## 2024-06-07 PROCEDURE — 82728 ASSAY OF FERRITIN: CPT

## 2024-06-07 PROCEDURE — 93356 MYOCRD STRAIN IMG SPCKL TRCK: CPT

## 2024-06-07 PROCEDURE — 84295 ASSAY OF SERUM SODIUM: CPT

## 2024-06-07 PROCEDURE — 84443 ASSAY THYROID STIM HORMONE: CPT

## 2024-06-07 PROCEDURE — 99285 EMERGENCY DEPT VISIT HI MDM: CPT

## 2024-06-07 PROCEDURE — 82247 BILIRUBIN TOTAL: CPT

## 2024-06-07 PROCEDURE — 81001 URINALYSIS AUTO W/SCOPE: CPT

## 2024-06-07 PROCEDURE — 71046 X-RAY EXAM CHEST 2 VIEWS: CPT

## 2024-06-07 PROCEDURE — 36415 COLL VENOUS BLD VENIPUNCTURE: CPT

## 2024-06-07 PROCEDURE — 86901 BLOOD TYPING SEROLOGIC RH(D): CPT

## 2024-06-07 PROCEDURE — 85610 PROTHROMBIN TIME: CPT

## 2024-06-07 PROCEDURE — 85025 COMPLETE CBC W/AUTO DIFF WBC: CPT

## 2024-06-07 PROCEDURE — 86850 RBC ANTIBODY SCREEN: CPT

## 2024-06-07 PROCEDURE — 83880 ASSAY OF NATRIURETIC PEPTIDE: CPT

## 2024-06-07 PROCEDURE — 86900 BLOOD TYPING SEROLOGIC ABO: CPT

## 2024-06-07 PROCEDURE — 82803 BLOOD GASES ANY COMBINATION: CPT

## 2024-06-07 PROCEDURE — 93005 ELECTROCARDIOGRAM TRACING: CPT

## 2024-06-07 PROCEDURE — 96374 THER/PROPH/DIAG INJ IV PUSH: CPT

## 2024-06-07 PROCEDURE — 87491 CHLMYD TRACH DNA AMP PROBE: CPT

## 2024-06-07 PROCEDURE — 83550 IRON BINDING TEST: CPT

## 2024-06-07 PROCEDURE — 96375 TX/PRO/DX INJ NEW DRUG ADDON: CPT

## 2024-06-07 PROCEDURE — 85027 COMPLETE CBC AUTOMATED: CPT

## 2024-06-07 PROCEDURE — 94640 AIRWAY INHALATION TREATMENT: CPT

## 2024-06-07 PROCEDURE — 93306 TTE W/DOPPLER COMPLETE: CPT

## 2024-06-07 PROCEDURE — 83036 HEMOGLOBIN GLYCOSYLATED A1C: CPT

## 2024-06-07 PROCEDURE — P9016: CPT

## 2024-06-07 PROCEDURE — 84145 PROCALCITONIN (PCT): CPT

## 2024-06-07 PROCEDURE — 85379 FIBRIN DEGRADATION QUANT: CPT

## 2024-06-07 PROCEDURE — 82607 VITAMIN B-12: CPT

## 2024-06-07 RX ORDER — ASCORBIC ACID 60 MG
1 TABLET,CHEWABLE ORAL
Qty: 30 | Refills: 0
Start: 2024-06-07 | End: 2024-07-06

## 2024-06-07 RX ORDER — POLYETHYLENE GLYCOL 3350 17 G/17G
17 POWDER, FOR SOLUTION ORAL
Qty: 1 | Refills: 0
Start: 2024-06-07 | End: 2024-07-06

## 2024-06-07 RX ORDER — FUROSEMIDE 40 MG
1 TABLET ORAL
Qty: 30 | Refills: 0
Start: 2024-06-07 | End: 2024-07-06

## 2024-06-07 RX ORDER — SIMETHICONE 80 MG/1
1 TABLET, CHEWABLE ORAL
Qty: 60 | Refills: 0
Start: 2024-06-07 | End: 2024-07-06

## 2024-06-07 RX ORDER — AMOXICILLIN 250 MG/5ML
1 SUSPENSION, RECONSTITUTED, ORAL (ML) ORAL
Qty: 9 | Refills: 0
Start: 2024-06-07 | End: 2024-06-09

## 2024-06-07 RX ORDER — ACETAMINOPHEN 500 MG
3 TABLET ORAL
Qty: 0 | Refills: 0 | DISCHARGE
Start: 2024-06-07

## 2024-06-07 RX ORDER — SENNA PLUS 8.6 MG/1
2 TABLET ORAL
Qty: 60 | Refills: 0
Start: 2024-06-07 | End: 2024-07-06

## 2024-06-07 RX ADMIN — Medication 325 MILLIGRAM(S): at 14:07

## 2024-06-07 RX ADMIN — Medication 500 MILLIGRAM(S): at 14:07

## 2024-06-07 RX ADMIN — PIPERACILLIN AND TAZOBACTAM 25 GRAM(S): 4; .5 INJECTION, POWDER, LYOPHILIZED, FOR SOLUTION INTRAVENOUS at 00:54

## 2024-06-07 RX ADMIN — PIPERACILLIN AND TAZOBACTAM 25 GRAM(S): 4; .5 INJECTION, POWDER, LYOPHILIZED, FOR SOLUTION INTRAVENOUS at 14:06

## 2024-06-07 RX ADMIN — PIPERACILLIN AND TAZOBACTAM 25 GRAM(S): 4; .5 INJECTION, POWDER, LYOPHILIZED, FOR SOLUTION INTRAVENOUS at 07:40

## 2024-06-07 RX ADMIN — PANTOPRAZOLE SODIUM 40 MILLIGRAM(S): 20 TABLET, DELAYED RELEASE ORAL at 17:32

## 2024-06-07 RX ADMIN — Medication 40 MILLIGRAM(S): at 06:10

## 2024-06-07 RX ADMIN — PANTOPRAZOLE SODIUM 40 MILLIGRAM(S): 20 TABLET, DELAYED RELEASE ORAL at 06:10

## 2024-06-07 NOTE — DISCHARGE NOTE NURSING/CASE MANAGEMENT/SOCIAL WORK - NSDCPEFALRISK_GEN_ALL_CORE
For information on Fall & Injury Prevention, visit: [email or web address] OR  [email or web address] OR  https://www.cdc.gov/steadi/patient.html

## 2024-06-07 NOTE — PHYSICAL THERAPY INITIAL EVALUATION ADULT - ASSISTIVE DEVICE FOR STAIR TRANSFER, REHAB EVAL
No handrail at home; PT recommends,  hold her hand and arm (PT demonstrated) ; pt verbalized understanding.

## 2024-06-07 NOTE — PROGRESS NOTE ADULT - REASON FOR ADMISSION
fever chills flank pain

## 2024-06-07 NOTE — PHYSICAL THERAPY INITIAL EVALUATION ADULT - ADDITIONAL COMMENTS
Pt lives in a private house with 4 steps to enter, no handrail, and living area all one level. Pt was independent PTA except occasional use of cane outdoors (recent knee procedure).

## 2024-06-07 NOTE — DISCHARGE NOTE PROVIDER - NSDCFUSCHEDAPPT_GEN_ALL_CORE_FT
[facility] Physician [facility]  ULTRASND [address]  Scheduled Appointment: 06/10/2024    Palak Palacios  [facility]  OPHTHALM [address]  Scheduled Appointment: 07/18/2024     [facility] Physician [facility]  TREVOR  [facility]   Scheduled Appointment: 06/12/2024    Palak Palacios  [facility] [address] [facility]  Scheduled Appointment: 07/18/2024

## 2024-06-07 NOTE — PROGRESS NOTE ADULT - PROVIDER SPECIALTY LIST ADULT
Infectious Disease
Infectious Disease
Pulmonology
Infectious Disease
Infectious Disease
Internal Medicine

## 2024-06-07 NOTE — PROGRESS NOTE ADULT - SUBJECTIVE AND OBJECTIVE BOX
Date of Service: 06-06-24 @ 17:21    Patient is a 49y old  Female who presents with a chief complaint of fever chills flank pain (06 Jun 2024 14:25)      Any change in ROS: doing pretty good:  n o sob:      MEDICATIONS  (STANDING):  ascorbic acid 500 milliGRAM(s) Oral daily  enoxaparin Injectable 40 milliGRAM(s) SubCutaneous every 24 hours  ferrous    sulfate 325 milliGRAM(s) Oral daily  furosemide    Tablet 40 milliGRAM(s) Oral daily  iron sucrose Injectable 200 milliGRAM(s) IV Push once  pantoprazole  Injectable 40 milliGRAM(s) IV Push every 12 hours  piperacillin/tazobactam IVPB.. 3.375 Gram(s) IV Intermittent every 8 hours  potassium chloride    Tablet ER 40 milliEquivalent(s) Oral once    MEDICATIONS  (PRN):  acetaminophen     Tablet .. 975 milliGRAM(s) Oral every 6 hours PRN Temp greater or equal to 38C (100.4F), Mild Pain (1 - 3)  polyethylene glycol 3350 17 Gram(s) Oral daily PRN Constipation  senna 2 Tablet(s) Oral at bedtime PRN Constipation  simethicone 80 milliGRAM(s) Chew two times a day PRN Gas    Vital Signs Last 24 Hrs  T(C): 36.9 (06 Jun 2024 16:11), Max: 37.8 (06 Jun 2024 04:40)  T(F): 98.5 (06 Jun 2024 16:11), Max: 100 (06 Jun 2024 04:40)  HR: 75 (06 Jun 2024 16:11) (68 - 75)  BP: 136/78 (06 Jun 2024 16:11) (121/72 - 136/78)  BP(mean): --  RR: 18 (06 Jun 2024 16:11) (18 - 18)  SpO2: 93% (06 Jun 2024 16:11) (92% - 98%)    Parameters below as of 06 Jun 2024 16:11  Patient On (Oxygen Delivery Method): nasal cannula  O2 Flow (L/min): 1      I&O's Summary    05 Jun 2024 07:01  -  06 Jun 2024 07:00  --------------------------------------------------------  IN: 960 mL / OUT: 0 mL / NET: 960 mL          Physical Exam:   GENERAL: NAD, well-groomed, well-developed  HEENT: CHIOMA/   Atraumatic, Normocephalic  ENMT: No tonsillar erythema, exudates, or enlargement; Moist mucous membranes, Good dentition, No lesions  NECK: Supple, No JVD, Normal thyroid  CHEST/LUNG: Clear to auscultaion  CVS: Regular rate and rhythm; No murmurs, rubs, or gallops  GI: : Soft, Nontender, Nondistended; Bowel sounds present  NERVOUS SYSTEM:  Alert & Oriented X3  EXTREMITIES: - edema  LYMPH: No lymphadenopathy noted  SKIN: No rashes or lesions  ENDOCRINOLOGY: No Thyromegaly  PSYCH: Appropriate    Labs:  UNKNOWN                            8.1    8.37  )-----------( 255      ( 06 Jun 2024 07:02 )             24.6                         8.3    8.14  )-----------( 249      ( 05 Jun 2024 22:57 )             25.5                         6.9    7.04  )-----------( 212      ( 05 Jun 2024 10:46 )             21.1                         6.7    8.28  )-----------( 215      ( 05 Jun 2024 07:17 )             20.6                         7.3    8.52  )-----------( 171      ( 04 Jun 2024 09:44 )             22.4                         7.7    8.46  )-----------( 148      ( 03 Jun 2024 06:31 )             24.0                         7.4    8.09  )-----------( 155      ( 03 Jun 2024 05:00 )             22.9     06-06    141  |  106  |  8   ----------------------------<  104<H>  3.8   |  21<L>  |  0.89  06-05    140  |  107  |  9   ----------------------------<  101<H>  3.2<L>   |  20<L>  |  1.05  06-04    142  |  111<H>  |  8   ----------------------------<  116<H>  4.0   |  20<L>  |  1.01  06-03    141  |  110<H>  |  7   ----------------------------<  107<H>  3.4<L>   |  19<L>  |  0.88    Ca    9.0      06 Jun 2024 07:00  Ca    8.2<L>      05 Jun 2024 07:16    TPro  x   /  Alb  x   /  TBili  0.4  /  DBili  x   /  AST  x   /  ALT  x   /  AlkPhos  x   06-05    CAPILLARY BLOOD GLUCOSE            PT/INR - ( 05 Jun 2024 07:16 )   PT: 13.6 sec;   INR: 1.31 ratio           Urinalysis Basic - ( 06 Jun 2024 07:00 )    Color: x / Appearance: x / SG: x / pH: x  Gluc: 104 mg/dL / Ketone: x  / Bili: x / Urobili: x   Blood: x / Protein: x / Nitrite: x   Leuk Esterase: x / RBC: x / WBC x   Sq Epi: x / Non Sq Epi: x / Bacteria: x      D-Dimer Assay, Quantitative: 973 ng/mL DDU (06-05 @ 10:46)  Lactate, Blood: 1.6 mmol/L (06-06 @ 07:03)  Lactate, Blood: 1.3 mmol/L (06-05 @ 22:57)  Lactate, Blood: 2.5 mmol/L (06-05 @ 14:24)  Lactate, Blood: 3.4 mmol/L (06-05 @ 10:46)        RECENT CULTURES:  06-02 @ 18:45 .Blood Blood         rad< from: CT Angio Chest PE Protocol w/ IV Cont (06.05.24 @ 12:28) >  CTA: While there is good contrast bolus to the pulmonary arteries, study   is limited due to respiratory motion artifact. No pulmonary embolism to   the segmental level. Subsegmental branches are poorly evaluated. The   great vessels are normal in size. The heart is normal in size. No   pericardial effusion.    Lungs/Airways/Pleura: Small right and trace left pleural effusions. No   pneumothorax. Groundglass opacity with mild septal thickening may be due   to pulmonary edema. The central airways are patent.    Mediastinum/Lymph nodes: Mildly enlarged mediastinal lymph nodes   measuring up to 1.1 cm short axis, likely reactive.    Upper Abdomen: Unremarkable.    Bones and Soft Tissues: No aggressive osseous lesions.    IMPRESSION:  No pulmonary embolism to the segmental level.    Mild pulmonary edema with small right and trace left pleural effusions.    --- End of Report ---            AARON MACIAS M.D., Attending Radiologist  This document has been electronicallysigned. Jun 5 2024  1:22PM    < end of copied text >         No growth at 72 Hours    06-02 @ 18:35 .Blood Blood                No growth at 72 Hours    06-01 @ 23:25 Clean Catch Clean Catch (Midstream)                No growth          RESPIRATORY CULTURES:          Studies  Chest X-RAY  CT SCAN Chest   Venous Dopplers: LE:   CT Abdomen  Others              
Date of Service: 06-07-24 @ 16:46    Patient is a 49y old  Female who presents with a chief complaint of fever chills flank pain (07 Jun 2024 14:44)      Any change in ROS: she looks much better:  for dc today  ?: on room air     MEDICATIONS  (STANDING):  ascorbic acid 500 milliGRAM(s) Oral daily  enoxaparin Injectable 40 milliGRAM(s) SubCutaneous every 24 hours  ferrous    sulfate 325 milliGRAM(s) Oral daily  furosemide    Tablet 40 milliGRAM(s) Oral daily  pantoprazole  Injectable 40 milliGRAM(s) IV Push every 12 hours  piperacillin/tazobactam IVPB.. 3.375 Gram(s) IV Intermittent every 8 hours  potassium chloride    Tablet ER 40 milliEquivalent(s) Oral once    MEDICATIONS  (PRN):  acetaminophen     Tablet .. 975 milliGRAM(s) Oral every 6 hours PRN Temp greater or equal to 38C (100.4F), Mild Pain (1 - 3)  polyethylene glycol 3350 17 Gram(s) Oral daily PRN Constipation  senna 2 Tablet(s) Oral at bedtime PRN Constipation  simethicone 80 milliGRAM(s) Chew two times a day PRN Gas    Vital Signs Last 24 Hrs  T(C): 37.1 (07 Jun 2024 16:22), Max: 37.4 (06 Jun 2024 21:19)  T(F): 98.8 (07 Jun 2024 16:22), Max: 99.3 (06 Jun 2024 21:19)  HR: 71 (07 Jun 2024 16:22) (64 - 84)  BP: 125/80 (07 Jun 2024 16:22) (113/82 - 149/85)  BP(mean): --  RR: 18 (07 Jun 2024 16:22) (18 - 18)  SpO2: 97% (07 Jun 2024 16:22) (93% - 98%)    Parameters below as of 07 Jun 2024 16:22  Patient On (Oxygen Delivery Method): room air        I&O's Summary        Physical Exam:   GENERAL: NAD, well-groomed, well-developed  HEENT: CHIOMA/   Atraumatic, Normocephalic  ENMT: No tonsillar erythema, exudates, or enlargement; Moist mucous membranes, Good dentition, No lesions  NECK: Supple, No JVD, Normal thyroid  CHEST/LUNG: Clear to auscultaion  CVS: Regular rate and rhythm; No murmurs, rubs, or gallops  GI: : Soft, Nontender, Nondistended; Bowel sounds present  NERVOUS SYSTEM:  Alert & Oriented X3  EXTREMITIES:  2+ Peripheral Pulses, No clubbing, cyanosis, or edema  LYMPH: No lymphadenopathy noted  SKIN: No rashes or lesions  ENDOCRINOLOGY: No Thyromegaly  PSYCH: Appropriate    Labs:  UNKNOWN                            8.1    8.37  )-----------( 255      ( 06 Jun 2024 07:02 )             24.6                         8.3    8.14  )-----------( 249      ( 05 Jun 2024 22:57 )             25.5                         6.9    7.04  )-----------( 212      ( 05 Jun 2024 10:46 )             21.1                         6.7    8.28  )-----------( 215      ( 05 Jun 2024 07:17 )             20.6                         7.3    8.52  )-----------( 171      ( 04 Jun 2024 09:44 )             22.4     06-06    141  |  106  |  8   ----------------------------<  104<H>  3.8   |  21<L>  |  0.89  06-05    140  |  107  |  9   ----------------------------<  101<H>  3.2<L>   |  20<L>  |  1.05  06-04    142  |  111<H>  |  8   ----------------------------<  116<H>  4.0   |  20<L>  |  1.01    Ca    9.0      06 Jun 2024 07:00    TPro  x   /  Alb  x   /  TBili  0.4  /  DBili  x   /  AST  x   /  ALT  x   /  AlkPhos  x   06-05    CAPILLARY BLOOD GLUCOSE              Urinalysis Basic - ( 06 Jun 2024 07:00 )    Color: x / Appearance: x / SG: x / pH: x  Gluc: 104 mg/dL / Ketone: x  / Bili: x / Urobili: x   Blood: x / Protein: x / Nitrite: x   Leuk Esterase: x / RBC: x / WBC x   Sq Epi: x / Non Sq Epi: x / Bacteria: x      D-Dimer Assay, Quantitative: 973 ng/mL DDU (06-05 @ 10:46)  Lactate, Blood: 1.6 mmol/L (06-06 @ 07:03)  Lactate, Blood: 1.3 mmol/L (06-05 @ 22:57)  Lactate, Blood: 2.5 mmol/L (06-05 @ 14:24)  Lactate, Blood: 3.4 mmol/L (06-05 @ 10:46)        RECENT CULTURES:  06-02 @ 18:45 .Blood Blood     rad< from: CT Angio Chest PE Protocol w/ IV Cont (06.05.24 @ 12:28) >    Lungs/Airways/Pleura: Small right and trace left pleural effusions. No   pneumothorax. Groundglass opacity with mild septal thickening may be due   to pulmonary edema. The central airways are patent.    Mediastinum/Lymph nodes: Mildly enlarged mediastinal lymph nodes   measuring up to 1.1 cm short axis, likely reactive.    Upper Abdomen: Unremarkable.    Bones and Soft Tissues: No aggressive osseous lesions.    IMPRESSION:  No pulmonary embolism to the segmental level.    Mild pulmonary edema with small right and trace left pleural effusions.    --- End of Report ---            AARON MACIAS M.D., Attending Radiologist  This document has been electronicallysigned. Jun 5 2024  1:22PM    < end of copied text >             No growth at 4 days    06-02 @ 18:35 .Blood Blood                No growth at 4 days    06-01 @ 23:25 Clean Catch Clean Catch (Midstream)                No growth          RESPIRATORY CULTURES:          Studies  Chest X-RAY  CT SCAN Chest   Venous Dopplers: LE:   CT Abdomen  Others              
Patient is a 49y old  Female who presents with a chief complaint of fever chills flank pain (06 Jun 2024 17:21)      DATE OF SERVICE: 06-07-24 @ 13:00    SUBJECTIVE / OVERNIGHT EVENTS: overnight events noted    ROS:  Resp: No cough no sputum production  CVS: No chest pain no palpitations no orthopnea  GI: no N/V/D  "I feel much better"    at bedside         MEDICATIONS  (STANDING):  ascorbic acid 500 milliGRAM(s) Oral daily  enoxaparin Injectable 40 milliGRAM(s) SubCutaneous every 24 hours  ferrous    sulfate 325 milliGRAM(s) Oral daily  furosemide    Tablet 40 milliGRAM(s) Oral daily  pantoprazole  Injectable 40 milliGRAM(s) IV Push every 12 hours  piperacillin/tazobactam IVPB.. 3.375 Gram(s) IV Intermittent every 8 hours  potassium chloride    Tablet ER 40 milliEquivalent(s) Oral once    MEDICATIONS  (PRN):  acetaminophen     Tablet .. 975 milliGRAM(s) Oral every 6 hours PRN Temp greater or equal to 38C (100.4F), Mild Pain (1 - 3)  polyethylene glycol 3350 17 Gram(s) Oral daily PRN Constipation  senna 2 Tablet(s) Oral at bedtime PRN Constipation  simethicone 80 milliGRAM(s) Chew two times a day PRN Gas        CAPILLARY BLOOD GLUCOSE        I&O's Summary      Vital Signs Last 24 Hrs  T(C): 36.9 (07 Jun 2024 11:20), Max: 37.4 (06 Jun 2024 21:19)  T(F): 98.4 (07 Jun 2024 11:20), Max: 99.3 (06 Jun 2024 21:19)  HR: 79 (07 Jun 2024 11:20) (64 - 84)  BP: 120/68 (07 Jun 2024 11:20) (113/82 - 149/85)  BP(mean): --  RR: 18 (07 Jun 2024 11:20) (18 - 18)  SpO2: 95% (07 Jun 2024 11:20) (93% - 98%)    PHYSICAL EXAM:  CHEST/LUNG: clear   HEART: S1 S2; no murmurs   ABDOMEN: Soft, Nontender  EXTREMITIES:  no edema  NEUROLOGY: AO x 3 non-focal  SKIN: No rashes or lesions    LABS:                        8.1    8.37  )-----------( 255      ( 06 Jun 2024 07:02 )             24.6     06-06    141  |  106  |  8   ----------------------------<  104<H>  3.8   |  21<L>  |  0.89    Ca    9.0      06 Jun 2024 07:00            Urinalysis Basic - ( 06 Jun 2024 07:00 )    Color: x / Appearance: x / SG: x / pH: x  Gluc: 104 mg/dL / Ketone: x  / Bili: x / Urobili: x   Blood: x / Protein: x / Nitrite: x   Leuk Esterase: x / RBC: x / WBC x   Sq Epi: x / Non Sq Epi: x / Bacteria: x          All consultant(s) notes reviewed and care discussed with other providers        Contact Number, Dr Robles [phone]
  Follow Up:  pyelo    Interval History/ROS:  notes abd bloating, nausea, sob, general discomfort  - request medicine for "gas"    Allergies  No Known Allergies    ANTIMICROBIALS:  piperacillin/tazobactam IVPB.. 3.375 every 8 hours      OTHER MEDS:  MEDICATIONS  (STANDING):  acetaminophen     Tablet .. 975 every 6 hours PRN  enoxaparin Injectable 40 every 24 hours  ketorolac   Injectable 15 every 6 hours PRN  medroxyPROGESTERone 10 daily  pantoprazole    Tablet 40 before breakfast  phenazopyridine 200 every 8 hours  polyethylene glycol 3350 17 daily PRN  senna 2 at bedtime PRN  simethicone 80 two times a day PRN      Vital Signs Last 24 Hrs  T(C): 37 (04 Jun 2024 11:59), Max: 38.1 (03 Jun 2024 20:30)  T(F): 98.6 (04 Jun 2024 11:59), Max: 100.6 (03 Jun 2024 20:30)  HR: 80 (04 Jun 2024 11:59) (71 - 110)  BP: 143/66 (04 Jun 2024 11:59) (91/60 - 149/68)  BP(mean): --  RR: 18 (04 Jun 2024 11:59) (16 - 19)  SpO2: 95% (04 Jun 2024 11:59) (91% - 97%)    Parameters below as of 04 Jun 2024 11:59  Patient On (Oxygen Delivery Method): room air  O2 Flow (L/min): 37      PHYSICAL EXAM:  General:  NAD, Non-toxic  Neurology: A&Ox3, nonfocal  Respiratory: Clear to auscultation bilaterally  CV: RRR, S1S2, no murmurs, rubs or gallops  Abdominal: tight -distended, normal bowel sounds  Extremities: No edema,  Line Sites: Clear  Skin: No rash                          7.3    8.52  )-----------( 171      ( 04 Jun 2024 09:44 )             22.4   WBC Count: 8.52 (06-04 @ 09:44)  WBC Count: 8.46 (06-03 @ 06:31)  WBC Count: 8.09 (06-03 @ 05:00)  WBC Count: 13.18 (06-01 @ 23:00)    06-04    142  |  111<H>  |  8   ----------------------------<  116<H>  4.0   |  20<L>  |  1.01    Ca    8.2<L>      04 Jun 2024 09:44    06.01.24 @ 23:00) Lipase: 23 U/L  06.03.24 @ 04:45)   Blood Gas Venous - Lactate: 0.7 mmol/L0  6.01.24 @ 23:00)  Procalcitonin: 1.27:   Urinalysis + Microscopic Examination (06.01.24 @ 23:25)   pH Urine: 6.0  Urine Appearance: Clear  Color: Yellow  Specific Gravity: 1.011  Protein, Urine: 30 mg/dL  Glucose Qualitative, Urine: Negative mg/dL  Ketone - Urine: Negative mg/dL  Blood, Urine: Trace  Bilirubin: Negative  Urobilinogen: 0.2 mg/dL  Leukocyte Esterase Concentration: Large  Nitrite: Negative  White Blood Cell - Urine: 130 /HPF  Red Blood Cell - Urine: 1 /HPF  Bacteria: Negative /HPF  Cast: 0 /LPF  Epithelial Cells: 0 /HPF    MICROBIOLOGY:  .Blood Blood  06-02-24   No growth at 24 hours  --  --      .Blood Blood  06-02-24   No growth at 24 hours  --  --      Clean Catch Clean Catch (Midstream)  06-01-24   No growth  --  --    RADIOLOGY:  < from: US Transvaginal (06.02.24 @ 05:05) >  IMPRESSION:  Endometrial thickening, measuring up to 25 mm. Consider further   evaluation with OB/GYN consultation and endometrial sampling.    A tubular cystic structure in the left adnexa, indeterminate. Diagnostic   considerations include hydrosalpinx/pyosalpinx, with other etiologies not   excluded.    < end of copied text >  < from: CT Abdomen and Pelvis w/ IV Cont (06.01.24 @ 23:38) >  IMPRESSION:  Pyelonephritis bilaterally, without renal abscess at this time.    Mild right hydroureteronephrosis, with bilateral proximal urothelial   enhancement and urinary bladder wall thickening, suggesting   ureteritis/cystitis. No evidence of obstructing ureteral calculus or   nephrolithiasis at this time.    Myomatous uterus with endometrial prominence. Consider further evaluation   with pelvic ultrasound.    < end of copied text >    < from: Xray Chest 2 Views PA/Lat (06.01.24 @ 23:09) >  FINDINGS:  The heart size is normal.  No focal consolidation. Normal pulmonary vasculature  No pleural effusion.  No pneumothorax.  No acute osseous abnormalities.    IMPRESSION:  No focal consolidation.    < end of copied text >      Jose Hernandez MD; Division of Infectious Disease; Pager: [phone]; nights and weekends: [phone]
Date of Service: 06-05-24 @ 17:14    Patient is a 49y old  Female who presents with a chief complaint of fever chills flank pain (05 Jun 2024 16:43)      Any change in ROS: Sheis lying down on bed:  with no sob:  no cough : no phlegm      MEDICATIONS  (STANDING):  enoxaparin Injectable 40 milliGRAM(s) SubCutaneous every 24 hours  ferrous    sulfate 325 milliGRAM(s) Oral daily  furosemide    Tablet 40 milliGRAM(s) Oral daily  pantoprazole  Injectable 40 milliGRAM(s) IV Push every 12 hours  piperacillin/tazobactam IVPB.. 3.375 Gram(s) IV Intermittent every 8 hours  potassium chloride    Tablet ER 40 milliEquivalent(s) Oral once    MEDICATIONS  (PRN):  acetaminophen     Tablet .. 975 milliGRAM(s) Oral every 6 hours PRN Temp greater or equal to 38C (100.4F), Mild Pain (1 - 3)  polyethylene glycol 3350 17 Gram(s) Oral daily PRN Constipation  senna 2 Tablet(s) Oral at bedtime PRN Constipation  simethicone 80 milliGRAM(s) Chew two times a day PRN Gas    Vital Signs Last 24 Hrs  T(C): 37.5 (05 Jun 2024 16:40), Max: 37.5 (05 Jun 2024 16:40)  T(F): 99.5 (05 Jun 2024 16:40), Max: 99.5 (05 Jun 2024 16:40)  HR: 88 (05 Jun 2024 16:40) (79 - 114)  BP: 137/73 (05 Jun 2024 16:40) (93/60 - 137/73)  BP(mean): --  RR: 18 (05 Jun 2024 16:40) (18 - 22)  SpO2: 98% (05 Jun 2024 16:40) (82% - 98%)    Parameters below as of 05 Jun 2024 16:40  Patient On (Oxygen Delivery Method): nasal cannula  O2 Flow (L/min): 2      I&O's Summary    04 Jun 2024 07:01  -  05 Jun 2024 07:00  --------------------------------------------------------  IN: 580 mL / OUT: 0 mL / NET: 580 mL    05 Jun 2024 07:01  -  05 Jun 2024 17:14  --------------------------------------------------------  IN: 480 mL / OUT: 0 mL / NET: 480 mL          Physical Exam:   GENERAL: NAD, well-groomed, well-developed  HEENT: CHIOMA/   Atraumatic, Normocephalic  ENMT: No tonsillar erythema, exudates, or enlargement; Moist mucous membranes, Good dentition, No lesions  NECK: Supple, No JVD, Normal thyroid  CHEST/LUNG: Clear to auscultaion  CVS: Regular rate and rhythm; No murmurs, rubs, or gallops  GI: : Soft, Nontender, Nondistended; Bowel sounds present  NERVOUS SYSTEM:  Alert & Oriented X3  EXTREMITIES: - edema  LYMPH: No lymphadenopathy noted  SKIN: No rashes or lesions  ENDOCRINOLOGY: No Thyromegaly  PSYCH: Appropriate    Labs:  UNKNOWN                            6.9    7.04  )-----------( 212      ( 05 Jun 2024 10:46 )             21.1                         6.7    8.28  )-----------( 215      ( 05 Jun 2024 07:17 )             20.6                         7.3    8.52  )-----------( 171      ( 04 Jun 2024 09:44 )             22.4                         7.7    8.46  )-----------( 148      ( 03 Jun 2024 06:31 )             24.0                         7.4    8.09  )-----------( 155      ( 03 Jun 2024 05:00 )             22.9                         9.8    13.18 )-----------( 256      ( 01 Jun 2024 23:00 )             30.2     06-05    140  |  107  |  9   ----------------------------<  101<H>  3.2<L>   |  20<L>  |  1.05  06-04    142  |  111<H>  |  8   ----------------------------<  116<H>  4.0   |  20<L>  |  1.01  06-03    141  |  110<H>  |  7   ----------------------------<  107<H>  3.4<L>   |  19<L>  |  0.88  06-01    139  |  103  |  10  ----------------------------<  130<H>  3.8   |  22  |  1.03    Ca    8.2<L>      05 Jun 2024 07:16  Ca    8.2<L>      04 Jun 2024 09:44    TPro  x   /  Alb  x   /  TBili  0.4  /  DBili  x   /  AST  x   /  ALT  x   /  AlkPhos  x   06-05  TPro  7.8  /  Alb  4.0  /  TBili  0.4  /  DBili  x   /  AST  18  /  ALT  16  /  AlkPhos  53  06-01    CAPILLARY BLOOD GLUCOSE            PT/INR - ( 05 Jun 2024 07:16 )   PT: 13.6 sec;   INR: 1.31 ratio           Urinalysis Basic - ( 05 Jun 2024 07:16 )    Color: x / Appearance: x / SG: x / pH: x  Gluc: 101 mg/dL / Ketone: x  / Bili: x / Urobili: x   Blood: x / Protein: x / Nitrite: x   Leuk Esterase: x / RBC: x / WBC x   Sq Epi: x / Non Sq Epi: x / Bacteria: x      D-Dimer Assay, Quantitative: 973 ng/mL DDU (06-05 @ 10:46)  Procalcitonin: 1.27 ng/mL (06-01 @ 23:00)  Lactate, Blood: 2.5 mmol/L (06-05 @ 14:24)  Lactate, Blood: 3.4 mmol/L (06-05 @ 10:46)        RECENT CULTURES:  06-02 @ 18:45 .Blood Blood            rad< from: CT Angio Chest PE Protocol w/ IV Cont (06.05.24 @ 12:28) >  pneumothorax. Groundglass opacity with mild septal thickening may be due   to pulmonary edema. The central airways are patent.    Mediastinum/Lymph nodes: Mildly enlarged mediastinal lymph nodes   measuring up to 1.1 cm short axis, likely reactive.    Upper Abdomen: Unremarkable.    Bones and Soft Tissues: No aggressive osseous lesions.    IMPRESSION:  No pulmonary embolism to the segmental level.    Mild pulmonary edema with small right and trace left pleural effusions.    --- End of Report ---            AARON MACIAS M.D., Attending Radiologist  This document has been electronicallysigned. Jun 5 2024  1:22PM    < end of copied text >      No growth at 48 Hours    06-02 @ 18:35 .Blood Blood                No growth at 48 Hours    06-01 @ 23:25 Clean Catch Clean Catch (Midstream)                No growth          RESPIRATORY CULTURES:          Studies  Chest X-RAY  CT SCAN Chest   Venous Dopplers: LE:   CT Abdomen  Others              
  Follow Up:  fever    Interval History/ROS:  feels better, sob improved, shaking chills resolved    Allergies  No Known Allergies    ANTIMICROBIALS:  piperacillin/tazobactam IVPB.. 3.375 every 8 hours      OTHER MEDS:  MEDICATIONS  (STANDING):  acetaminophen     Tablet .. 975 every 6 hours PRN  enoxaparin Injectable 40 every 24 hours  furosemide    Tablet 40 daily  pantoprazole  Injectable 40 every 12 hours  polyethylene glycol 3350 17 daily PRN  senna 2 at bedtime PRN  simethicone 80 two times a day PRN      Vital Signs Last 24 Hrs  T(C): 37.8 (06 Jun 2024 04:40), Max: 37.8 (06 Jun 2024 04:40)  T(F): 100 (06 Jun 2024 04:40), Max: 100 (06 Jun 2024 04:40)  HR: 75 (06 Jun 2024 04:40) (68 - 88)  BP: 128/78 (06 Jun 2024 04:40) (121/72 - 137/73)  BP(mean): --  RR: 18 (06 Jun 2024 04:40) (18 - 18)  SpO2: 98% (06 Jun 2024 04:40) (92% - 98%)    Parameters below as of 06 Jun 2024 04:40  Patient On (Oxygen Delivery Method): nasal cannula  O2 Flow (L/min): 2      PHYSICAL EXAM:  General: WN/WD NAD, Non-toxic  Neurology: A&Ox3, nonfocal  Respiratory: Clear to auscultation bilaterally  CV: RRR, S1S2, no murmurs, rubs or gallops  Abdominal: Soft, Non-tender, non-distended, normal bowel sounds  Extremities: No edema,  Line Sites: Clear  Skin: No rash                        8.1    8.37  )-----------( 255      ( 06 Jun 2024 07:02 )             24.6       06-06    141  |  106  |  8   ----------------------------<  104<H>  3.8   |  21<L>  |  0.89    Ca    9.0      06 Jun 2024 07:00    TPro  x   /  Alb  x   /  TBili  0.4  /  DBili  x   /  AST  x   /  ALT  x   /  AlkPhos  x   06-05    06.01.24 @ 23:00)  Procalcitonin: 1.27:   06.01.24 @ 23:00)  Lipase: 23 U/L  Urinalysis + Microscopic Examination (06.01.24 @ 23:25)   pH Urine: 6.0  Urine Appearance: Clear  Color: Yellow  Specific Gravity: 1.011  Protein, Urine: 30 mg/dL  Glucose Qualitative, Urine: Negative mg/dL  Ketone - Urine: Negative mg/dL  Blood, Urine: Trace  Bilirubin: Negative  Urobilinogen: 0.2 mg/dL  Leukocyte Esterase Concentration: Large  Nitrite: Negative  White Blood Cell - Urine: 130 /HPF  Red Blood Cell - Urine: 1 /HPF  Bacteria: Negative /HPF  Cast: 0 /LPF  Epithelial Cells: 0 /HPF        MICROBIOLOGY:  .Blood Blood  06-02-24   No growth at 72 Hours  --  --      .Blood Blood  06-02-24   No growth at 72 Hours  --  --      Clean Catch Clean Catch (Midstream)  06-01-24   No growth  --  --    RADIOLOGY:  < from: CT Angio Chest PE Protocol w/ IV Cont (06.05.24 @ 12:28) >  IMPRESSION:  No pulmonary embolism to the segmental level.    Mild pulmonary edema with small right and trace left pleural effusions.    < end of copied text >  < from: Xray Chest 1 View- PORTABLE-Routine (Xray Chest 1 View- PORTABLE-Routine .) (06.04.24 @ 12:52) >  IMPRESSION:    Mild pulmonary vascular congestion with small right pleural effusion    < end of copied text >      Jose Hernandez MD; Division of Infectious Disease; Pager: [phone]; nights and weekends: [phone]
  Follow Up:  fever    Interval History/ROS:  reports chills, has improved sob    Allergies  No Known Allergies    ANTIMICROBIALS:  piperacillin/tazobactam IVPB.. 3.375 every 8 hours      OTHER MEDS:  MEDICATIONS  (STANDING):  acetaminophen     Tablet .. 975 every 6 hours PRN  enoxaparin Injectable 40 every 24 hours  furosemide    Tablet 40 daily  pantoprazole  Injectable 40 every 12 hours  polyethylene glycol 3350 17 daily PRN  senna 2 at bedtime PRN  simethicone 80 two times a day PRN      Vital Signs Last 24 Hrs  T(C): 37.5 (05 Jun 2024 16:40), Max: 37.5 (05 Jun 2024 16:40)  T(F): 99.5 (05 Jun 2024 16:40), Max: 99.5 (05 Jun 2024 16:40)  HR: 88 (05 Jun 2024 16:40) (79 - 114)  BP: 137/73 (05 Jun 2024 16:40) (93/60 - 137/73)  BP(mean): --  RR: 18 (05 Jun 2024 16:40) (18 - 22)  SpO2: 98% (05 Jun 2024 16:40) (82% - 98%)    Parameters below as of 05 Jun 2024 16:40  Patient On (Oxygen Delivery Method): nasal cannula  O2 Flow (L/min): 2      PHYSICAL EXAM:  General:  NAD, Non-toxic  Neurology: A&Ox3, nonfocal  Respiratory: Clear to auscultation bilaterally  CV: RRR, S1S2, no murmurs, rubs or gallops  Abdominal: Soft, Non-tender, non-distended, normal bowel sounds  Extremities: No edema  Line Sites: Clear  Skin: No rash                        6.9    7.04  )-----------( 212      ( 05 Jun 2024 10:46 )             21.1   WBC Count: 7.04 (06-05 @ 10:46)  WBC Count: 8.28 (06-05 @ 07:17)  WBC Count: 8.52 (06-04 @ 09:44)  WBC Count: 8.46 (06-03 @ 06:31)  WBC Count: 8.09 (06-03 @ 05:00)  WBC Count: 13.18 (06-01 @ 23:00)      06-05    140  |  107  |  9   ----------------------------<  101<H>  3.2<L>   |  20<L>  |  1.05    Ca    8.2<L>      05 Jun 2024 07:16    TPro  x   /  Alb  x   /  TBili  0.4  /  DBili  x   /  AST  x   /  ALT  x   /  AlkPhos  x   06-05    Urinalysis + Microscopic Examination (06.01.24 @ 23:25)   pH Urine: 6.0  Urine Appearance: Clear  Color: Yellow  Specific Gravity: 1.011  Protein, Urine: 30 mg/dL  Glucose Qualitative, Urine: Negative mg/dL  Ketone - Urine: Negative mg/dL  Blood, Urine: Trace  Bilirubin: Negative  Urobilinogen: 0.2 mg/dL  Leukocyte Esterase Concentration: Large  Nitrite: Negative  White Blood Cell - Urine: 130 /HPF  Red Blood Cell - Urine: 1 /HPF  Bacteria: Negative /HPF  Cast: 0 /LPF      MICROBIOLOGY:  .Blood Blood  06-02-24   No growth at 48 Hours  --  --      .Blood Blood  06-02-24   No growth at 48 Hours  --  --      Clean Catch Clean Catch (Midstream)  06-01-24   No growth  --  --    RADIOLOGY:  < from: CT Angio Chest PE Protocol w/ IV Cont (06.05.24 @ 12:28) >  FINDINGS:  CTA: While there is good contrast bolus to the pulmonary arteries, study   is limited due to respiratory motion artifact. No pulmonary embolism to   the segmental level. Subsegmental branches are poorly evaluated. The   great vessels are normal in size. The heart is normal in size. No   pericardial effusion.    Lungs/Airways/Pleura: Small right and trace left pleural effusions. No   pneumothorax. Groundglass opacity with mild septal thickening may be due   to pulmonary edema. The central airways are patent.    Mediastinum/Lymph nodes: Mildly enlarged mediastinal lymph nodes   measuring up to 1.1 cm short axis, likely reactive.    Upper Abdomen: Unremarkable.    Bones and Soft Tissues: No aggressive osseous lesions.    IMPRESSION:  No pulmonary embolism to the segmental level.    Mild pulmonary edema with small right and trace left pleural effusions.    < end of copied text >  < from: US Transvaginal (06.02.24 @ 05:05) >  IMPRESSION:  Endometrial thickening, measuring up to 25 mm. Consider further   evaluation with OB/GYN consultation and endometrial sampling.    A tubular cystic structure in the left adnexa, indeterminate. Diagnostic   considerations include hydrosalpinx/pyosalpinx, with other etiologies not   excluded.    < end of copied text >  < from: CT Abdomen and Pelvis w/ IV Cont (06.01.24 @ 23:38) >  IMPRESSION:  Pyelonephritis bilaterally, without renal abscess at this time.    Mild right hydroureteronephrosis, with bilateral proximal urothelial   enhancement and urinary bladder wall thickening, suggesting   ureteritis/cystitis. No evidence of obstructing ureteral calculus or   nephrolithiasis at this time.    Myomatous uterus with endometrial prominence. Consider further evaluation   with pelvic ultrasound.    < end of copied text >      Jose Hernandez MD; Division of Infectious Disease; Pager: [phone]; nights and weekends: [phone]
  Follow Up:  pyelo    Interval History/ROS:  feels much better, breathing improved, no abd pain    Allergies  No Known Allergies    ANTIMICROBIALS:  piperacillin/tazobactam IVPB.. 3.375 every 8 hours      OTHER MEDS:  MEDICATIONS  (STANDING):  acetaminophen     Tablet .. 975 every 6 hours PRN  enoxaparin Injectable 40 every 24 hours  furosemide    Tablet 40 daily  pantoprazole  Injectable 40 every 12 hours  polyethylene glycol 3350 17 daily PRN  senna 2 at bedtime PRN  simethicone 80 two times a day PRN      Vital Signs Last 24 Hrs  T(C): 36.9 (07 Jun 2024 11:20), Max: 37.4 (06 Jun 2024 21:19)  T(F): 98.4 (07 Jun 2024 11:20), Max: 99.3 (06 Jun 2024 21:19)  HR: 74 (07 Jun 2024 13:03) (64 - 84)  BP: 126/84 (07 Jun 2024 13:03) (113/82 - 149/85)  BP(mean): --  RR: 18 (07 Jun 2024 11:20) (18 - 18)  SpO2: 97% (07 Jun 2024 13:03) (93% - 98%)    Parameters below as of 07 Jun 2024 13:03  Patient On (Oxygen Delivery Method): room air    PHYSICAL EXAM:  General: WN/WD NAD, Non-toxic  Neurology: A&Ox3, nonfocal  Respiratory: Clear to auscultation bilaterally  CV: RRR, S1S2, no murmurs, rubs or gallops  Abdominal: Soft, Non-tender, non-distended, normal bowel sounds  Extremities: No edema,   Line Sites: Clear  Skin: No rash                        8.1    8.37  )-----------( 255      ( 06 Jun 2024 07:02 )             24.6   WBC Count: 8.37 (06-06 @ 07:02)  WBC Count: 8.14 (06-05 @ 22:57)  WBC Count: 7.04 (06-05 @ 10:46)  WBC Count: 8.28 (06-05 @ 07:17)  WBC Count: 8.52 (06-04 @ 09:44)  WBC Count: 8.46 (06-03 @ 06:31)  WBC Count: 8.09 (06-03 @ 05:00)    06-06    141  |  106  |  8   ----------------------------<  104<H>  3.8   |  21<L>  |  0.89    Ca    9.0      06 Jun 2024 07:00    Urinalysis + Microscopic Examination (06.01.24 @ 23:25)   pH Urine: 6.0  Urine Appearance: Clear  Color: Yellow  Specific Gravity: 1.011  Protein, Urine: 30 mg/dL  Glucose Qualitative, Urine: Negative mg/dL  Ketone - Urine: Negative mg/dL  Blood, Urine: Trace  Bilirubin: Negative  Urobilinogen: 0.2 mg/dL  Leukocyte Esterase Concentration: Large  Nitrite: Negative  White Blood Cell - Urine: 130 /HPF  Red Blood Cell - Urine: 1 /HPF  Bacteria: Negative /HPF  Cast: 0 /LPF  Epithelial Cells: 0 /HPF    MICROBIOLOGY:  .Blood Blood  06-02-24   No growth at 4 days  --  --      .Blood Blood  06-02-24   No growth at 4 days  --  --      Clean Catch Clean Catch (Midstream)  06-01-24   No growth  --  --    RADIOLOGY:  < from: CT Angio Chest PE Protocol w/ IV Cont (06.05.24 @ 12:28) >  IMPRESSION:  No pulmonary embolism to the segmental level.    Mild pulmonary edema with small right and trace left pleural effusions.    < end of copied text >  < from: CT Abdomen and Pelvis w/ IV Cont (06.01.24 @ 23:38) >  IMPRESSION:  Pyelonephritis bilaterally, without renal abscess at this time.    Mild right hydroureteronephrosis, with bilateral proximal urothelial   enhancement and urinary bladder wall thickening, suggesting   ureteritis/cystitis. No evidence of obstructing ureteral calculus or   nephrolithiasis at this time.    Myomatous uterus with endometrial prominence. Consider further evaluation   with pelvic ultrasound.    < end of copied text >      Jose Hernandez MD; Division of Infectious Disease; Pager: [phone]; nights and weekends: [phone]
Patient is a 49y old  Female who presents with a chief complaint of fever chills flank pain (04 Jun 2024 15:01)      DATE OF SERVICE: 06-05-24 @ 14:18    SUBJECTIVE / OVERNIGHT EVENTS: overnight events noted  daughter at bedside  patient declined use of  services, preferred family translate instead     ROS:  Resp: No cough no sputum production  CVS: No chest pain no palpitations no orthopnea  GI: no N/V/D  : no dysuria, no hematuria  Neuro: no weakness no paresthesias  "I feel better"         MEDICATIONS  (STANDING):  enoxaparin Injectable 40 milliGRAM(s) SubCutaneous every 24 hours  ferrous    sulfate 325 milliGRAM(s) Oral daily  pantoprazole  Injectable 40 milliGRAM(s) IV Push every 12 hours  piperacillin/tazobactam IVPB.. 3.375 Gram(s) IV Intermittent every 8 hours  potassium chloride    Tablet ER 40 milliEquivalent(s) Oral once    MEDICATIONS  (PRN):  acetaminophen     Tablet .. 975 milliGRAM(s) Oral every 6 hours PRN Temp greater or equal to 38C (100.4F), Mild Pain (1 - 3)  polyethylene glycol 3350 17 Gram(s) Oral daily PRN Constipation  senna 2 Tablet(s) Oral at bedtime PRN Constipation  simethicone 80 milliGRAM(s) Chew two times a day PRN Gas        CAPILLARY BLOOD GLUCOSE        I&O's Summary    04 Jun 2024 07:01  -  05 Jun 2024 07:00  --------------------------------------------------------  IN: 580 mL / OUT: 0 mL / NET: 580 mL        Vital Signs Last 24 Hrs  T(C): 36.9 (05 Jun 2024 11:04), Max: 37.4 (04 Jun 2024 15:32)  T(F): 98.4 (05 Jun 2024 11:04), Max: 99.3 (04 Jun 2024 15:32)  HR: 79 (05 Jun 2024 11:04) (79 - 114)  BP: 101/62 (05 Jun 2024 11:04) (93/60 - 114/64)  BP(mean): --  RR: 18 (05 Jun 2024 11:04) (18 - 22)  SpO2: 97% (05 Jun 2024 11:04) (82% - 97%)    PHYSICAL EXAM:  GENERAL: no respiratory distress today  CHEST/LUNG: clear   HEART: S1 S2; no murmurs   ABDOMEN: Soft, Nontender  EXTREMITIES:  trace edema  NEUROLOGY: AO x 3 non-focal  SKIN: No rashes or lesions    LABS:                        6.9    7.04  )-----------( 212      ( 05 Jun 2024 10:46 )             21.1     06-05    140  |  107  |  9   ----------------------------<  101<H>  3.2<L>   |  20<L>  |  1.05    Ca    8.2<L>      05 Jun 2024 07:16    TPro  x   /  Alb  x   /  TBili  0.4  /  DBili  x   /  AST  x   /  ALT  x   /  AlkPhos  x   06-05    PT/INR - ( 05 Jun 2024 07:16 )   PT: 13.6 sec;   INR: 1.31 ratio               Urinalysis Basic - ( 05 Jun 2024 07:16 )    Color: x / Appearance: x / SG: x / pH: x  Gluc: 101 mg/dL / Ketone: x  / Bili: x / Urobili: x   Blood: x / Protein: x / Nitrite: x   Leuk Esterase: x / RBC: x / WBC x   Sq Epi: x / Non Sq Epi: x / Bacteria: x          All consultant(s) notes reviewed and care discussed with other providers        Contact Number, Dr Robles [phone]
Patient is a 49y old  Female who presents with a chief complaint of fever chills flank pain (06 Jun 2024 13:23)      DATE OF SERVICE: 06-06-24 @ 14:26    SUBJECTIVE / OVERNIGHT EVENTS: overnight events noted    ROS:  Resp: No cough no sputum production  CVS: No chest pain no palpitations no orthopnea  GI: no N/V/D  "I feel much better"         MEDICATIONS  (STANDING):  enoxaparin Injectable 40 milliGRAM(s) SubCutaneous every 24 hours  ferrous    sulfate 325 milliGRAM(s) Oral daily  furosemide    Tablet 40 milliGRAM(s) Oral daily  iron sucrose Injectable 200 milliGRAM(s) IV Push once  pantoprazole  Injectable 40 milliGRAM(s) IV Push every 12 hours  piperacillin/tazobactam IVPB.. 3.375 Gram(s) IV Intermittent every 8 hours  potassium chloride    Tablet ER 40 milliEquivalent(s) Oral once    MEDICATIONS  (PRN):  acetaminophen     Tablet .. 975 milliGRAM(s) Oral every 6 hours PRN Temp greater or equal to 38C (100.4F), Mild Pain (1 - 3)  polyethylene glycol 3350 17 Gram(s) Oral daily PRN Constipation  senna 2 Tablet(s) Oral at bedtime PRN Constipation  simethicone 80 milliGRAM(s) Chew two times a day PRN Gas        CAPILLARY BLOOD GLUCOSE        I&O's Summary    05 Jun 2024 07:01  -  06 Jun 2024 07:00  --------------------------------------------------------  IN: 960 mL / OUT: 0 mL / NET: 960 mL        Vital Signs Last 24 Hrs  T(C): 37.8 (06 Jun 2024 04:40), Max: 37.8 (06 Jun 2024 04:40)  T(F): 100 (06 Jun 2024 04:40), Max: 100 (06 Jun 2024 04:40)  HR: 75 (06 Jun 2024 04:40) (68 - 88)  BP: 128/78 (06 Jun 2024 04:40) (121/72 - 137/73)  BP(mean): --  RR: 18 (06 Jun 2024 04:40) (18 - 18)  SpO2: 98% (06 Jun 2024 04:40) (92% - 98%)    PHYSICAL EXAM:  CHEST/LUNG: clear   HEART: S1 S2; no murmurs   ABDOMEN: Soft, Nontender  EXTREMITIES:  trace edema  NEUROLOGY: AO x 3 non-focal  SKIN: No rashes or lesions    LABS:                        8.1    8.37  )-----------( 255      ( 06 Jun 2024 07:02 )             24.6     06-06    141  |  106  |  8   ----------------------------<  104<H>  3.8   |  21<L>  |  0.89    Ca    9.0      06 Jun 2024 07:00    TPro  x   /  Alb  x   /  TBili  0.4  /  DBili  x   /  AST  x   /  ALT  x   /  AlkPhos  x   06-05    PT/INR - ( 05 Jun 2024 07:16 )   PT: 13.6 sec;   INR: 1.31 ratio               Urinalysis Basic - ( 06 Jun 2024 07:00 )    Color: x / Appearance: x / SG: x / pH: x  Gluc: 104 mg/dL / Ketone: x  / Bili: x / Urobili: x   Blood: x / Protein: x / Nitrite: x   Leuk Esterase: x / RBC: x / WBC x   Sq Epi: x / Non Sq Epi: x / Bacteria: x          All consultant(s) notes reviewed and care discussed with other providers        Contact Number, Dr Robles [phone]
Patient is a 49y old  Female who presents with a chief complaint of fever chills flank pain (04 Jun 2024 13:05)      DATE OF SERVICE: 06-04-24 @ 13:30    SUBJECTIVE / OVERNIGHT EVENTS: overnight events noted    ROS:  Resp: No cough no sputum production  CVS: No chest pain no palpitations no orthopnea  GI: no N/V/D  : no dysuria, no hematuria  overall improved; however reports shortness of breath today      MEDICATIONS  (STANDING):  enoxaparin Injectable 40 milliGRAM(s) SubCutaneous every 24 hours  ferrous    sulfate 325 milliGRAM(s) Oral daily  medroxyPROGESTERone 10 milliGRAM(s) Oral daily  pantoprazole  Injectable 40 milliGRAM(s) IV Push two times a day  phenazopyridine 200 milliGRAM(s) Oral every 8 hours  piperacillin/tazobactam IVPB.. 3.375 Gram(s) IV Intermittent every 8 hours    MEDICATIONS  (PRN):  acetaminophen     Tablet .. 975 milliGRAM(s) Oral every 6 hours PRN Temp greater or equal to 38C (100.4F), Mild Pain (1 - 3)  polyethylene glycol 3350 17 Gram(s) Oral daily PRN Constipation  senna 2 Tablet(s) Oral at bedtime PRN Constipation  simethicone 80 milliGRAM(s) Chew two times a day PRN Gas        CAPILLARY BLOOD GLUCOSE        I&O's Summary    03 Jun 2024 07:01  -  04 Jun 2024 07:00  --------------------------------------------------------  IN: 1800 mL / OUT: 0 mL / NET: 1800 mL        Vital Signs Last 24 Hrs  T(C): 37 (04 Jun 2024 11:59), Max: 38.1 (03 Jun 2024 20:30)  T(F): 98.6 (04 Jun 2024 11:59), Max: 100.6 (03 Jun 2024 20:30)  HR: 80 (04 Jun 2024 11:59) (71 - 110)  BP: 143/66 (04 Jun 2024 11:59) (91/60 - 149/68)  BP(mean): --  RR: 18 (04 Jun 2024 11:59) (16 - 19)  SpO2: 95% (04 Jun 2024 11:59) (91% - 97%)    PHYSICAL EXAM:  GENERAL: in mild respiratory distress  NECK: Supple, No JVD  CHEST/LUNG: decreased breath sounds bases   HEART: S1 S2; no murmurs   ABDOMEN: Soft, Nontender  EXTREMITIES:  trace edema  NEUROLOGY: AO x 3 non-focal  SKIN: No rashes or lesions    LABS:                        7.3    8.52  )-----------( 171      ( 04 Jun 2024 09:44 )             22.4     06-04    142  |  111<H>  |  8   ----------------------------<  116<H>  4.0   |  20<L>  |  1.01    Ca    8.2<L>      04 Jun 2024 09:44            Urinalysis Basic - ( 04 Jun 2024 09:44 )    Color: x / Appearance: x / SG: x / pH: x  Gluc: 116 mg/dL / Ketone: x  / Bili: x / Urobili: x   Blood: x / Protein: x / Nitrite: x   Leuk Esterase: x / RBC: x / WBC x   Sq Epi: x / Non Sq Epi: x / Bacteria: x          All consultant(s) notes reviewed and care discussed with other providers        Contact Number, Dr Robles [phone]

## 2024-06-07 NOTE — DISCHARGE NOTE PROVIDER - NSDCMRMEDTOKEN_GEN_ALL_CORE_FT
acetaminophen 325 mg oral tablet: 3 tab(s) orally every 6 hours As needed Temp greater or equal to 38C (100.4F), Mild Pain (1 - 3)  amoxicillin 500 mg oral tablet: 1 tab(s) orally every 8 hours  ascorbic acid 500 mg oral tablet: 1 tab(s) orally once a day  FeroSul 325 mg (65 mg elemental iron) oral tablet: 1 tab(s) orally once a day  furosemide 40 mg oral tablet: 1 tab(s) orally once a day  medroxyPROGESTERone 10 mg oral tablet: 1 tab(s) orally once a day  omeprazole 20 mg oral delayed release tablet: 1 tab(s) orally once a day  senna leaf extract oral tablet: 2 tab(s) orally once a day (at bedtime) as needed for Constipation  simethicone 80 mg oral tablet, chewable: 1 tab(s) orally 2 times a day as needed for Gas   acetaminophen 325 mg oral tablet: 3 tab(s) orally every 6 hours As needed Temp greater or equal to 38C (100.4F), Mild Pain (1 - 3)  amoxicillin 500 mg oral tablet: 1 tab(s) orally every 8 hours  ascorbic acid 500 mg oral tablet: 1 tab(s) orally once a day  FeroSul 325 mg (65 mg elemental iron) oral tablet: 1 tab(s) orally once a day  furosemide 40 mg oral tablet: 1 tab(s) orally once a day  medroxyPROGESTERone 10 mg oral tablet: 1 tab(s) orally once a day  omeprazole 20 mg oral delayed release tablet: 1 tab(s) orally once a day  polyethylene glycol 3350 oral powder for reconstitution: 17 gram(s) orally once a day as needed for  constipation  senna leaf extract oral tablet: 2 tab(s) orally once a day (at bedtime) as needed for Constipation  simethicone 80 mg oral tablet, chewable: 1 tab(s) orally 2 times a day as needed for Gas

## 2024-06-07 NOTE — DISCHARGE NOTE PROVIDER - CARE PROVIDER_API CALL
Stefani Banerjee  Obstetrics and Gynecology  [address] [place] [id number]  Phone: [phone]  Fax: [phone]  Follow Up Time: 1 week    Scarlett Medina  Internal Medicine  [address] [place] [address], [address]  [place] [address]  Phone: [phone]  Fax: [phone]  Follow Up Time: 1 week

## 2024-06-07 NOTE — PROGRESS NOTE ADULT - ASSESSMENT
49y female with pmhx of GERD who presents to the Emergency Department  with complaints of abdominal pain + dysuria clinical presentation consistent with cystitis with associated sepsis, concern for possible PID
49 year old woman with GERD, constipation with previous UTIs admitted 6/3/24 with vaginal bleeding, dysuria, fever, leukocytosis - now resolved, pyuria, cystitis, right ureteritis, mild right hydroureteronephrosis    complicated UTI  - right pyelonephritis with negative cultures after taking Levofloxacin just prior to hospitalization  h/o UTIs    PT appears to be perimenopausal  - topical vaginal estrogen may be useful if vaginal mucosal atrophy noted.  6/2 BC X2 NGTD;   6/1 Urine Cx NGTD   pt took levofloxacin pta - may be obscuring culture results  SOB of unclear significance  6/5 decreased dyspnea, Chest CTA neg for PE, mild pulmonary edema  6/6 subjectively improved  6/7  improved   outpatient follow up with Gyn Dr Banerjee arranged  -- likely needs D&C for uterine endometrial thickening    Antibiotics  Levofloxacin x1 about 6 /1   Doxy 6/2--> 6/3  Ceftriaxone 6/2   Cefpodoxime 6/2  6/3  Zosyn 6/3-->6/7    Suggest   Complete  Zosyn today      please call ID if needed over weekend    
49 year old woman with GERD, constipation with previous UTIs admitted 6/3/24 with vaginal bleeding, dysuria, fever, leukocytosis - now resolved, pyuria, cystitis, right ureteritis, mild right hydroureteronephrosis    complicated UTI  h/o UTIs    PT appears to be perimenopausal  - topical vaginal estrogen may be useful if vaginal mucosal atrophy noted.  6/2 BC X2 NGTD;   6/1 Urine Cx NGTD   pt took levofloxacin pta - may be obscuring culture results  SOB of unclear significance  6/5 decreased dyspnea, Chest CTA neg for PE, mild pulmonary edema  6/6 subjectively improved    Antibiotics  Levofloxacin x1 about 6 /1   Doxy 6/2--> 6/3  Ceftriaxone 6/2   Cefpodoxime 6/2  6/3  Zosyn 6/3-->    Suggest   Continue Zosyn through 6/7  
49 year old woman with GERD, constipation with previous UTIs admitted 6/3/24 with vaginal bleeding, dysuria, fever, leukocytosis - now resolved, pyuria, cystitis, right ureteritis, mild right hydroureteronephrosis    complicated UTI  h/o UTIs    PT appears to be perimenopausal  - topical vaginal estrogen may be useful if vaginal mucosal atrophy noted.  6/2 BC X2 NGTD;   6/1 Urine Cx NGTD   pt took levofloxacin pta - may be obscuring culture results  SOB of unclear significance    Antibiotics  Levofloxacin x1 about 6 /1   Doxy 6/2--> 6/3  Ceftriaxone 6/2   Cefpodoxime 6/2  6/3  Zosyn 6/3-->    Suggest   Continue Zosyn    
49 year old woman with GERD, constipation with previous UTIs admitted 6/3/24 with vaginal bleeding, dysuria, fever, leukocytosis - now resolved, pyuria, cystitis, right ureteritis, mild right hydroureteronephrosis    complicated UTI  h/o UTIs    PT appears to be perimenopausal  - topical vaginal estrogen may be useful if vaginal mucosal atrophy noted.  6/2 BC X2 NGTD;   6/1 Urine Cx NGTD   pt took levofloxacin pta - may be obscuring culture results  SOB of unclear significance  6/5 decreased dyspnea, Chest CTA neg for PE, mild pulmonary edema    Antibiotics  Levofloxacin x1 about 6 /1   Doxy 6/2--> 6/3  Ceftriaxone 6/2   Cefpodoxime 6/2  6/3  Zosyn 6/3-->    Suggest   Continue Zosyn through 6/7  
 49y female with pmhx of GERD who presents to the Emergency Department with complaints of abdominal pain + dysuria. Contrary to triage note- patient vaginal bleeding from menstrual period. Patient complaining of fever + chills over the last 48-72 hours. Patient states the abdominal pain is non radiating and diffuse in nature. Patient also complaining of dysuria. Patient states she was recently started on levoflaxacin (only took one dose) however the pain acutely worsened and she presented to the Emergency Department  (03 Jun 2024 12:19)  per her  she used to have heavy vaginal bleeding and was prescribed some medication for two days which she finished few days ago : here she is with uti and has been coughing:  and feeling more sob:   -She used cough a lot and then her PCP gave her albuterol and she felt better with that  : she takes it very frequently: almost every day     Complicated uti :  SOB;  GERD:         Complicated uti :  -being followed by ID:  6/5: doing  ok : cont antibiotics  6/6: antibiotics per id   6/7: on zosyn: antibiotics per ID      SOB;  --she is complaining of sob for some time:   -now she feels more sob:   -she is on hormonal therapy for excessive vaginal bleeding:  no hx of pe or dvt:   -has had three c sections with no complications:  -she is mildly hypoxic too :  -would do cta   -dc hormonal therapy for now  -she also have have asthma as is using albuterol very frequently in night time: : would add Symbicort  -outpt PFT    6/5:  -cta showed mild pulm edema:  no pe:  defer to cards  6/6: she looks much better to me toay  : still on 2 L :  can try to wean it to room air;   -on mo lasix:    6/7:  -she looks much better:  on room air:  not toxic appearing:  for dc today on room air   GERD:  -on ppi:     cont dvt prophylaxis    dw ACP              
 49y female with pmhx of GERD who presents to the Emergency Department with complaints of abdominal pain + dysuria. Contrary to triage note- patient vaginal bleeding from menstrual period. Patient complaining of fever + chills over the last 48-72 hours. Patient states the abdominal pain is non radiating and diffuse in nature. Patient also complaining of dysuria. Patient states she was recently started on levoflaxacin (only took one dose) however the pain acutely worsened and she presented to the Emergency Department  (03 Jun 2024 12:19)  per her  she used to have heavy vaginal bleeding and was prescribed some medication for two days which she finished few days ago : here she is with uti and has been coughing:  and feeling more sob:   -She used cough a lot and then her PCP gave her albuterol and she felt better with that  : she takes it very frequently: almost every day     Complicated uti :  SOB;  GERD:         Complicated uti :  -being followed by ID:  6/5: doing  ok : cont antibiotics     SOB;  --she is complaining of sob for some time:   -now she feels more sob:   -she is on hormonal therapy for excessive vaginal bleeding:  no hx of pe or dvt:   -has had three c sections with no complications:  -she is mildly hypoxic too :  -would do cta   -dc hormonal therapy for now  -she also have have asthma as is using albuterol very frequently in night time: : would add Symbicort  -outpt PFT    6/5:  -cta showed mild pulm edema:  no pe:  defer to cards  GERD:  -on ppi:     cont dvt prophylaxis    called acp :              
 49y female with pmhx of GERD who presents to the Emergency Department with complaints of abdominal pain + dysuria. Contrary to triage note- patient vaginal bleeding from menstrual period. Patient complaining of fever + chills over the last 48-72 hours. Patient states the abdominal pain is non radiating and diffuse in nature. Patient also complaining of dysuria. Patient states she was recently started on levoflaxacin (only took one dose) however the pain acutely worsened and she presented to the Emergency Department  (03 Jun 2024 12:19)  per her  she used to have heavy vaginal bleeding and was prescribed some medication for two days which she finished few days ago : here she is with uti and has been coughing:  and feeling more sob:   -She used cough a lot and then her PCP gave her albuterol and she felt better with that  : she takes it very frequently: almost every day     Complicated uti :  SOB;  GERD:         Complicated uti :  -being followed by ID:  6/5: doing  ok : cont antibiotics  6/6: antibiotics per id      SOB;  --she is complaining of sob for some time:   -now she feels more sob:   -she is on hormonal therapy for excessive vaginal bleeding:  no hx of pe or dvt:   -has had three c sections with no complications:  -she is mildly hypoxic too :  -would do cta   -dc hormonal therapy for now  -she also have have asthma as is using albuterol very frequently in night time: : would add Symbicort  -outpt PFT    6/5:  -cta showed mild pulm edema:  no pe:  defer to cards  6/6: she looks much better to me toay  : still on 2 L :  can try to wean it to room air;   -on mpo lasix:    GERD:  -on ppi:     cont dvt prophylaxis    dw team              
 49y female with pmhx of GERD who presents to the Emergency Department  with complaints of abdominal pain + dysuria clinical presentation consistent with cystitis with associated sepsis, concern for possible PID

## 2024-06-07 NOTE — DISCHARGE NOTE PROVIDER - PROVIDER TOKENS
PROVIDER:[TOKEN:[[id number]],FOLLOWUP:[1 week]],PROVIDER:[TOKEN:[[id number]],FOLLOWUP:[1 week]]

## 2024-06-07 NOTE — DISCHARGE NOTE NURSING/CASE MANAGEMENT/SOCIAL WORK - NSTRANSFERBELONGINGSDISPO_GEN_A_NUR
The potassium is almost back to normal.    Advise eating bananas and citrus fruits/ juices.    Other labs are fine.    Liver tests ( ALT and AST ) are back to normal.    Finesse Beal MD   with patient

## 2024-06-07 NOTE — PROGRESS NOTE ADULT - PROBLEM SELECTOR PLAN 3
likely secondary to vaginal blood loss in the past combined with hydration dilutional drop  hemoglobin improved   start Vitamin C with oral iron

## 2024-06-07 NOTE — DISCHARGE NOTE PROVIDER - HOSPITAL COURSE
HPI:   49y female with pmhx of GERD who presents to the Emergency Department with complaints of abdominal pain + dysuria. Contrary to triage note- patient vaginal bleeding from menstrual period. Patient complaining of fever + chills over the last 48-72 hours. Patient states the abdominal pain is non radiating and diffuse in nature. Patient also complaining of dysuria. Patient states she was recently started on levoflaxacin (only took one dose) however the pain acutely worsened and she presented to the Emergency Department  (03 Jun 2024 12:19)    Hospital Course:   50 y/o F with history as stated about was abmitted for worsening of abdominal pain  found to have sepsis 2/2 pyelonephritis  - UTI ecoli.  Seen by ID and was started on Zosyn IVBP.  Patient was also seen by GYN, TVUS with results of endometrial thickening, she will follow up with GYN out patient .  Patient with severe anemia  in which she recieved 1 Unit  of PRBC  and  IV iron infusion x 1.  She was started on Ferrous Sulfate and vitamin C for treatment of anemia.   Patient with episode of SOB and was placed on 02 t herapy.  CTA negataive for PE  but mild pulmonary edema and covid negative.  Patient on po Lasix, SOB resolved.  Sepsis resolving patient can be discharged on Amoxicillin 500 mg po every 8 hours for a total of 3 days.    Patient stable for discharge today       Important Medication Changes and Reason:  will start Amoxicillin 500 mg every 8 hours x 3 days   Continue Ferrous sulfate and Vitamin C     Active or Pending Issues Requiring Follow-up:  Dr. Banerjee, GYN outpatient     Advanced Directives:   [ x] Full code  [ ] DNR  [ ] Hospice    Discharge Diagnoses:  Sepsis 2/2 pyelonephritis   Anemia  Heart failure   GERD  Constipation

## 2024-06-07 NOTE — DISCHARGE NOTE PROVIDER - NSDCCPCAREPLAN_GEN_ALL_CORE_FT
PRINCIPAL DISCHARGE DIAGNOSIS  Diagnosis: Undifferentiated abdominal pain  Assessment and Plan of Treatment: TV US with endometrial thickening   Outpatient follow up with GYN      SECONDARY DISCHARGE DIAGNOSES  Diagnosis: Cystitis  Assessment and Plan of Treatment: ID consult appreciated   Completed Zosyn    will start on Amicillin 500  mg po every 8 hr x 3 days   Outpatient follow up with Dr. Banerjee, GYN    Diagnosis: Sepsis  Assessment and Plan of Treatment: secondary to polynrphiyis   Completed course of IV antibiotic   will discharge on Amixicillin 500 mg po every 8 hours for 3 days    Diagnosis: Severe anemia  Assessment and Plan of Treatment: s/p I unit of PRBC  s/p IV iron infusion x 1  continue Ferrous sulfate   Contnue Vitamin C    Diagnosis: SOB (shortness of breath)  Assessment and Plan of Treatment: CTA negative for PE  Covid swap - negative   weaned off  of oxygen  now resolved   Continue  po Lasix    Diagnosis: GERD (gastroesophageal reflux disease)  Assessment and Plan of Treatment: Continue Prilosec    Diagnosis: Dehydration  Assessment and Plan of Treatment: resolved with IV fluids   Continue po fluid    Diagnosis: Acute heart failure with preserved ejection fraction (HFpEF)  Assessment and Plan of Treatment: Continue lasix po    Diagnosis: History of chronic constipation  Assessment and Plan of Treatment: Continue Mirilax  daily as needed

## 2024-06-07 NOTE — DISCHARGE NOTE PROVIDER - NSDCCPGOAL_GEN_ALL_CORE_FT
Dr. Renetta YOUSIF - pt will be discontinuing warfarin at end of January per Dr. Orr at [facility] and will remain off anticoagulants. To get better and follow your care plan as instructed.

## 2024-06-07 NOTE — PROGRESS NOTE ADULT - PROBLEM SELECTOR PLAN 1
will discontinue piperacillin/tazobactam and discharge on po amoxicillin x 3 more days 500 TID  outpatient follow up with Gyn Dr Banerjee   likely needs D&C for uterine endometrial thickening  patient and  educated in detail re need for follow up

## 2024-06-07 NOTE — DISCHARGE NOTE PROVIDER - CARE PROVIDERS DIRECT ADDRESSES
[email or web address]
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
## 2024-06-07 NOTE — PHYSICAL THERAPY INITIAL EVALUATION ADULT - PERTINENT HX OF CURRENT PROBLEM, REHAB EVAL
49y F with pmhx of GERD who presents to the ED with c/o abd pain + dysuria. Contrary to triage note- patient vaginal bleeding from menstrual period. Pt c/o fever + chills over the last 48-72 hours. Pt states the abdominal pain is non radiating and diffuse in nature. Pt also c/o dysuria. Patient states she was recently started on levoflaxacin (only took one dose) however the pain acutely worsened and she presented to the Emergency Department.  Pt admitted for symptoms consistent with cystitis with associated sepsis, concern for possible PID. 49y F with pmhx of GERD who presents to the ED with c/o abd pain + dysuria. Contrary to triage note- patient vaginal bleeding from menstrual period. Pt c/o fever + chills over the last 48-72 hours. Pt states the abdominal pain is non radiating and diffuse in nature. Pt also c/o dysuria. Patient states she was recently started on levoflaxacin (only took one dose) however the pain acutely worsened and she presented to the Emergency Department.  Pt admitted for symptoms consistent with cystitis with associated sepsis, concern for possible PID. GYN c/s. CXR 6/1: Mild pulmonary vascular congestion with small right pleural effusion.CTA/P: Pyelonephritis bilaterally, without renal abscess at this time. Mild right hydroureteronephrosis, with bilateral proximal urothelial enhancement and urinary bladder wall thickening, suggesting ureteritis/cystitis. No evidence of obstructing ureteral calculus or nephrolithiasis at this time.Myomatous uterus with endometrial prominence. Consider further evaluation with pelvic ultrasound.TV US 6/2: Endometrial thickening, measuring up to 25 mm. Consider further evaluation with OB/GYN consultation and endometrial sampling. A tubular cystic structure in the left adnexa, indeterminate. Diagnostic considerations include hydrosalpinx/pyosalpinx, with other etiologies not excluded. TTE: Left ventricular cavity is normal in size. Left ventricular wall thickness is normal. Left ventricular systolic function is normal with an ejection fraction of 58 % by Modi's method of disks. There are no regional wall motion abnormalities seen. Normal left ventricular diastolic function, with normal filling pressure. Normal right ventricular cavity size, with normal wall thickness, and normal systolic function. Tricuspid annular plane systolic excursion (TAPSE) is 1.8 cm (normal >=1.7 cm). No pericardial effusion seen. Left ventricular global longitudinal strain is -19.0 % which is normal (< -18%). Images were acquired on a [employer] ultrasound system and processed on the ultrasound machine with a heart rate of 90 bpm and a blood pressure of 143/66 mmHg. No prior echocardiogram is available for comparison. CTA: No pulmonary embolism to the segmental level.Mild pulmonary edema with small right and trace left pleural effusions.

## 2024-06-07 NOTE — PROGRESS NOTE ADULT - NSPROGADDITIONALINFOA_GEN_ALL_CORE
discussed with daughter at bedside in detail   will need outpatient follow up Gyn
discussed with patient in detail, expresses understanding of treatment plans.  discharge planning for tomorrow
discharge home

## 2024-06-07 NOTE — DISCHARGE NOTE NURSING/CASE MANAGEMENT/SOCIAL WORK - PATIENT PORTAL LINK FT
You can access the FollowMyHealth Patient Portal offered by [facility] by registering at the following website: [email or web address]. By joining [facility]’s FollowMyHealth portal, you will also be able to view your health information using other applications (apps) compatible with our system.

## 2024-06-10 ENCOUNTER — APPOINTMENT (OUTPATIENT)
Dept: ULTRASOUND IMAGING | Facility: CLINIC | Age: 49
End: 2024-06-10

## 2024-06-10 ENCOUNTER — RX RENEWAL (OUTPATIENT)
Age: 49
End: 2024-06-10

## 2024-06-12 ENCOUNTER — APPOINTMENT (OUTPATIENT)
Dept: OBGYN | Facility: CLINIC | Age: 49
End: 2024-06-12
Payer: MEDICAID

## 2024-06-12 ENCOUNTER — OUTPATIENT (OUTPATIENT)
Dept: OUTPATIENT SERVICES | Facility: HOSPITAL | Age: 49
LOS: 1 days | End: 2024-06-12
Payer: MEDICAID

## 2024-06-12 ENCOUNTER — LABORATORY RESULT (OUTPATIENT)
Age: 49
End: 2024-06-12

## 2024-06-12 VITALS — BODY MASS INDEX: 22.47 KG/M2 | DIASTOLIC BLOOD PRESSURE: 80 MMHG | SYSTOLIC BLOOD PRESSURE: 122 MMHG | WEIGHT: 135 LBS

## 2024-06-12 DIAGNOSIS — N93.9 ABNORMAL UTERINE AND VAGINAL BLEEDING, UNSPECIFIED: ICD-10-CM

## 2024-06-12 DIAGNOSIS — Z98.890 OTHER SPECIFIED POSTPROCEDURAL STATES: Chronic | ICD-10-CM

## 2024-06-12 DIAGNOSIS — N92.1 EXCESSIVE AND FREQUENT MENSTRUATION WITH IRREGULAR CYCLE: ICD-10-CM

## 2024-06-12 DIAGNOSIS — N76.0 ACUTE VAGINITIS: ICD-10-CM

## 2024-06-12 PROCEDURE — 99213 OFFICE O/P EST LOW 20 MIN: CPT | Mod: 25

## 2024-06-12 PROCEDURE — 58100 BIOPSY OF UTERUS LINING: CPT

## 2024-06-12 PROCEDURE — 81025 URINE PREGNANCY TEST: CPT

## 2024-06-12 PROCEDURE — G0463: CPT

## 2024-06-12 NOTE — PROCEDURE
[Endometrial Biopsy] : Endometrial biopsy [Time out performed] : Pre-procedure time out performed.  Patient's name, date of birth and procedure confirmed. [Consent Obtained] : Consent obtained [Irregular Bleeding] : irregular uterine bleeding [Thickened Endometrium] : thickened endometrium [Risks] : risks [Benefits] : benefits [Alternatives] : alternatives [Infection] : infection [Bleeding] : bleeding [Allergic Reaction] : allergic reaction [Uterine Perforation] : uterine perforation [Negative] : negative pregnancy test [Betadine] : Betadine [None] : none [Tenaculum] : Tenaculum [Easy Passage] : Easy passage [Sounded to ___ cm] : sounded to [unfilled] ~Ucm [Mid Position] : mid position [Abundant] : abundant [Specimen Collected] : collected [Sent to Pathology] : placed in buffered formalin and sent for pathology

## 2024-06-12 NOTE — DISCUSSION/SUMMARY
[FreeTextEntry1] : 48yo with AUB (severe anemia requiring blood transfusion during recent hospitalization )  - pelvic sono w/ thicked em 25mm + cystic - [ ]rpt EMB attempt successful - cont provera until result and f/u visit/plan - cont FESO4/Vit C  RTC 2 wks to review path/ plan Kana Carver ,PAC

## 2024-06-12 NOTE — HISTORY OF PRESENT ILLNESS
[FreeTextEntry1] : Pt is 50y/o P3 LMP 5/18/24 presenting as follow up for AUB.  Pt was seen 5/24- c/o prolonged heavy menstrual bleeding with passage of clots. EMB attempted at that time- unable to get sample.   Was started on progesterone and ordered pelvic sono.  Pt stopped bleeding wile on provera, when completed, started again.  Pelvic sono 6/2/24:  EM thickened 25mm, , cystic changes.  Tubular cystic structure in lt adenxa, indeterminate.    Pt recently admitted for pyelo/sepsis/CHF- severe anemia noted,  pt states given blood transfusion.  Hgb 6/6 up to 8.7 Pt taking Provera 10mg qd- no longer bleeding.

## 2024-06-13 DIAGNOSIS — N93.9 ABNORMAL UTERINE AND VAGINAL BLEEDING, UNSPECIFIED: ICD-10-CM

## 2024-06-20 ENCOUNTER — OUTPATIENT (OUTPATIENT)
Dept: OUTPATIENT SERVICES | Facility: HOSPITAL | Age: 49
LOS: 1 days | End: 2024-06-20
Payer: MEDICAID

## 2024-06-20 ENCOUNTER — APPOINTMENT (OUTPATIENT)
Dept: OBGYN | Facility: CLINIC | Age: 49
End: 2024-06-20
Payer: MEDICAID

## 2024-06-20 ENCOUNTER — RESULT REVIEW (OUTPATIENT)
Age: 49
End: 2024-06-20

## 2024-06-20 VITALS — DIASTOLIC BLOOD PRESSURE: 78 MMHG | SYSTOLIC BLOOD PRESSURE: 120 MMHG | WEIGHT: 135 LBS | BODY MASS INDEX: 22.47 KG/M2

## 2024-06-20 DIAGNOSIS — N76.0 ACUTE VAGINITIS: ICD-10-CM

## 2024-06-20 DIAGNOSIS — Z98.890 OTHER SPECIFIED POSTPROCEDURAL STATES: Chronic | ICD-10-CM

## 2024-06-20 DIAGNOSIS — I50.9 HEART FAILURE, UNSPECIFIED: ICD-10-CM

## 2024-06-20 PROCEDURE — G0463: CPT

## 2024-06-20 PROCEDURE — 99213 OFFICE O/P EST LOW 20 MIN: CPT

## 2024-06-20 RX ORDER — NORETHINDRONE ACETATE 5 MG/1
5 TABLET ORAL
Qty: 60 | Refills: 0 | Status: ACTIVE | COMMUNITY
Start: 2024-06-20 | End: 1900-01-01

## 2024-06-20 RX ORDER — MEDROXYPROGESTERONE ACETATE 10 MG/1
10 TABLET ORAL
Qty: 20 | Refills: 0 | Status: DISCONTINUED | COMMUNITY
Start: 2024-05-24 | End: 2024-06-20

## 2024-06-25 NOTE — PLAN
[FreeTextEntry1] : 49y P3 with AUB unknown origin with continued breakthrough bleeding on Provera.   - Repeat imaging with Sonohysterogram to further assess endometrial thickening and origin of AUB  - D/C Provera, Start Aygestin 10mg daily, discussed this medication change extensively with the patient  -Patient no longer desires fertility. Discussed potential need for definitive treatment of bleeding (such as IUD, IR or surgical intervention). -F/u with Internist in setting of recent hospitalization for medical optimization after recent hospitalization, patient  spoke with internist during visit  - Consider cardiology referral for recent CHF acute exacerbation, after followup with internist, referral given to patient   RTC 1 month after sonohystogram   D/w Dr. Nolan Levine, PGY1

## 2024-06-25 NOTE — HISTORY OF PRESENT ILLNESS
[FreeTextEntry1] : 49y P3 presenting for follow up for AUB s/p EMBx. Patient was seen on 5/24 with reported heavy prolonged bleeding. Endometrial biospy was attempted at that time but unsuccessful, patient was started on progesterone and ordered pelvic sono. Pelvic sono showed EM 25mm with cystic changes   Patient was then admitted for pyelo, sepsis, CHF with noted severe anemia and received IV iron and blood transfusion. While inpatient, she had CT which showed: and Pelvic US which showed:   CT (6/1): Fibroid uterus with a 1.0 cm lower uterine segment  myoma . The endometrium is prominent and measures 1.5 cm. No  adnexal mass.  TVUS (6/2): Uterus: 8.8 cm x 4.5 cm x 5.9 cm. Heterogenous echotexture. No discrete  myometrial mass. Endometrium: 25 mm. Thickened with cystic changes.  Patient was seen in clinic on 6/12 and EMBx completed at that time which evidenced late secretory phase endometrial tissue.   Today patient noted continued breakthrough bleeding, using multiple pads a day the past month. She noted continued daily provera use.

## 2024-06-26 DIAGNOSIS — N93.9 ABNORMAL UTERINE AND VAGINAL BLEEDING, UNSPECIFIED: ICD-10-CM

## 2024-06-27 ENCOUNTER — APPOINTMENT (OUTPATIENT)
Dept: CARDIOLOGY | Facility: HOSPITAL | Age: 49
End: 2024-06-27

## 2024-06-27 ENCOUNTER — NON-APPOINTMENT (OUTPATIENT)
Age: 49
End: 2024-06-27

## 2024-06-27 VITALS
BODY MASS INDEX: 22.88 KG/M2 | HEART RATE: 85 BPM | OXYGEN SATURATION: 97 % | WEIGHT: 137.31 LBS | DIASTOLIC BLOOD PRESSURE: 68 MMHG | HEIGHT: 65 IN | SYSTOLIC BLOOD PRESSURE: 104 MMHG

## 2024-06-28 ENCOUNTER — APPOINTMENT (OUTPATIENT)
Dept: ULTRASOUND IMAGING | Facility: CLINIC | Age: 49
End: 2024-06-28
Payer: MEDICAID

## 2024-06-28 PROCEDURE — 58340 CATHETER FOR HYSTEROGRAPHY: CPT

## 2024-06-28 PROCEDURE — 76831 ECHO EXAM UTERUS: CPT

## 2024-07-05 DIAGNOSIS — D25.9 LEIOMYOMA OF UTERUS, UNSPECIFIED: ICD-10-CM

## 2024-07-15 ENCOUNTER — APPOINTMENT (OUTPATIENT)
Dept: OBGYN | Facility: CLINIC | Age: 49
End: 2024-07-15
Payer: MEDICAID

## 2024-07-15 ENCOUNTER — OUTPATIENT (OUTPATIENT)
Dept: OUTPATIENT SERVICES | Facility: HOSPITAL | Age: 49
LOS: 1 days | End: 2024-07-15
Payer: MEDICAID

## 2024-07-15 VITALS — DIASTOLIC BLOOD PRESSURE: 84 MMHG | BODY MASS INDEX: 22.63 KG/M2 | WEIGHT: 136 LBS | SYSTOLIC BLOOD PRESSURE: 120 MMHG

## 2024-07-15 DIAGNOSIS — N93.9 ABNORMAL UTERINE AND VAGINAL BLEEDING, UNSPECIFIED: ICD-10-CM

## 2024-07-15 DIAGNOSIS — N76.0 ACUTE VAGINITIS: ICD-10-CM

## 2024-07-15 DIAGNOSIS — Z98.890 OTHER SPECIFIED POSTPROCEDURAL STATES: Chronic | ICD-10-CM

## 2024-07-15 PROCEDURE — G0463: CPT

## 2024-07-15 PROCEDURE — 99213 OFFICE O/P EST LOW 20 MIN: CPT

## 2024-07-16 DIAGNOSIS — N93.9 ABNORMAL UTERINE AND VAGINAL BLEEDING, UNSPECIFIED: ICD-10-CM

## 2024-07-18 ENCOUNTER — APPOINTMENT (OUTPATIENT)
Dept: MRI IMAGING | Facility: CLINIC | Age: 49
End: 2024-07-18

## 2024-07-18 ENCOUNTER — APPOINTMENT (OUTPATIENT)
Dept: OPHTHALMOLOGY | Facility: CLINIC | Age: 49
End: 2024-07-18
Payer: MEDICAID

## 2024-07-18 ENCOUNTER — NON-APPOINTMENT (OUTPATIENT)
Age: 49
End: 2024-07-18

## 2024-07-18 PROCEDURE — 92133 CPTRZD OPH DX IMG PST SGM ON: CPT

## 2024-07-18 PROCEDURE — 92004 COMPRE OPH EXAM NEW PT 1/>: CPT

## 2024-07-23 ENCOUNTER — APPOINTMENT (OUTPATIENT)
Dept: MRI IMAGING | Facility: CLINIC | Age: 49
End: 2024-07-23
Payer: MEDICAID

## 2024-07-23 PROCEDURE — 72197 MRI PELVIS W/O & W/DYE: CPT | Mod: 26

## 2024-07-29 ENCOUNTER — OUTPATIENT (OUTPATIENT)
Dept: OUTPATIENT SERVICES | Facility: HOSPITAL | Age: 49
LOS: 1 days | End: 2024-07-29
Payer: MEDICAID

## 2024-07-29 ENCOUNTER — APPOINTMENT (OUTPATIENT)
Dept: OBGYN | Facility: CLINIC | Age: 49
End: 2024-07-29
Payer: MEDICAID

## 2024-07-29 VITALS — BODY MASS INDEX: 22.8 KG/M2 | WEIGHT: 137 LBS | SYSTOLIC BLOOD PRESSURE: 130 MMHG | DIASTOLIC BLOOD PRESSURE: 84 MMHG

## 2024-07-29 DIAGNOSIS — Z98.890 OTHER SPECIFIED POSTPROCEDURAL STATES: Chronic | ICD-10-CM

## 2024-07-29 DIAGNOSIS — N76.0 ACUTE VAGINITIS: ICD-10-CM

## 2024-07-29 DIAGNOSIS — N80.03 ADENOMYOSIS OF THE UTERUS: ICD-10-CM

## 2024-07-29 PROCEDURE — G0463: CPT

## 2024-07-29 PROCEDURE — 99213 OFFICE O/P EST LOW 20 MIN: CPT

## 2024-07-29 NOTE — DISCUSSION/SUMMARY
[FreeTextEntry1] : 48yo P3- AUB - s/p EMB (benign) - unsuccessful sonohystogram - MRI suggestive of adenomyosis - Bleeding stopped w/ aygestin-  - discussed etiology of adenomyosis and inability to predict long term response to progesterone therapy .  Discussed role of Mirena as well.   Recommend gyn surg consult to discuss definitive tx if bleeding should return.    f/u [ ]gyn surg consult cont aygestin for now.  Kana villalba, PAC  F/u based on MRI result Kana Villalba,PAC.

## 2024-07-29 NOTE — HISTORY OF PRESENT ILLNESS
[FreeTextEntry1] : 49y P3 presenting for follow up for AUB / MRI  results. Patient was seen on 5/24 with reported heavy prolonged bleeding. Endometrial biospy was attempted at that time but unsuccessful, patient was started on progesterone and ordered pelvic sono. Pelvic sono showed EM 25mm with cystic changes Patient was then admitted for pyelo, sepsis, CHF with noted severe anemia and received IV iron and blood transfusion. While inpatient, she had CT which showed: and Pelvic US which showed: CT (6/1): Fibroid uterus with a 1.0 cm lower uterine segment myoma . The endometrium is prominent and measures 1.5 cm. No adnexal mass.  TVUS (6/2): Uterus: 8.8 cm x 4.5 cm x 5.9 cm. Heterogenous echotexture. No discrete myometrial mass. Endometrium: 25 mm. Thickened with cystic changes.  Patient was seen in clinic on 6/12 and EMBx completed at that time which evidenced late secretory phase endometrial tissue.  Sonohystogram 7/3/24- unsuccessful. probably adenomyosis, em lining thickened with questionable intracavity fibroid. MRI scheduled for 7/18  7/15: F/u visit pt states bleeding has finally stopped, still taking aygestin qd.  7/24 PELVIC MRI  IMPRESSION: Thickening of the posterior junctional zone measuring up to 2.2 cm with multiple T2 hyperintense foci, suggestive of adenomyosis.  7/29- Pt still taking Aygestin- no further bleeding.

## 2024-08-01 ENCOUNTER — OUTPATIENT (OUTPATIENT)
Dept: OUTPATIENT SERVICES | Facility: HOSPITAL | Age: 49
LOS: 1 days | End: 2024-08-01
Payer: MEDICAID

## 2024-08-01 ENCOUNTER — APPOINTMENT (OUTPATIENT)
Dept: OBGYN | Facility: CLINIC | Age: 49
End: 2024-08-01

## 2024-08-01 VITALS — WEIGHT: 135 LBS | SYSTOLIC BLOOD PRESSURE: 122 MMHG | BODY MASS INDEX: 22.47 KG/M2 | DIASTOLIC BLOOD PRESSURE: 80 MMHG

## 2024-08-01 DIAGNOSIS — Z98.890 OTHER SPECIFIED POSTPROCEDURAL STATES: Chronic | ICD-10-CM

## 2024-08-01 DIAGNOSIS — N93.9 ABNORMAL UTERINE AND VAGINAL BLEEDING, UNSPECIFIED: ICD-10-CM

## 2024-08-01 DIAGNOSIS — N76.0 ACUTE VAGINITIS: ICD-10-CM

## 2024-08-01 PROCEDURE — G0463: CPT

## 2024-08-01 PROCEDURE — 99214 OFFICE O/P EST MOD 30 MIN: CPT | Mod: GC

## 2024-08-01 NOTE — END OF VISIT
[] : Resident [FreeTextEntry3] : Agree with above. Patient seen and evaluated with resident. Ms. Dueñas is a 50yo P3 with AUB-A very well controlled on Aygesin 10mg. Discussed that given medical management is working very well, would not recommend surgery at this time. Discussed potentially discontinuing Aygestin once in menopause but to continue as prescribed for now. Questions and concerns addressed. To return to GYN for regular follow up or PRN.

## 2024-08-01 NOTE — REVIEW OF SYSTEMS
[Fatigue] : fatigue [Diarrhea] : diarrhea [Fever] : no fever [Chills] : no chills [Dyspnea] : no dyspnea [Chest Pain] : no chest pain [Abdominal Pain] : no abdominal pain [Dysuria] : no dysuria [Abn Vaginal bleeding] : no abnormal vaginal bleeding [Pelvic pain] : no pelvic pain

## 2024-08-01 NOTE — PHYSICAL EXAM
[Appropriately responsive] : appropriately responsive [Alert] : alert [No Acute Distress] : no acute distress [Oriented x3] : oriented x3 [FreeTextEntry5] : no labored breathing on RA

## 2024-08-01 NOTE — DISCUSSION/SUMMARY
[FreeTextEntry1] : Patient is a 49y  LMP May 2024 presenting for surgical consultation for likely adenomyosis.  Patient with prior heavy vaginal bleeding that she reports is now well controlled on Aygestin 10mg QD.  #Adenomyosis - Discussed likely diagnosis of adenomyosis with patient and partner, and that definitive diagnosis can only come from pathology.   - Given patient's multiple prior uterine surgeries and recent hospitalization for sepsis, as well as good symptomatic control on hormonal therapy, would not recommend surgical management at this time - Patient to continue on Aygestin 10mg QD - Patient aware to call clinic if heavy bleeding returns despite Aygestin - Can consider decreasing or ceasing Aygestin if patient is progressing to menopause (can evaluate via hormonal bloodwork)  Patient to RTC in one year for annual visit or sooner PRN  Patient seen and evaluated with Dr. Chris Miller PGY3

## 2024-08-01 NOTE — HISTORY OF PRESENT ILLNESS
[FreeTextEntry1] : Patient is a 49y  LMP May 2024 presenting for surgical consultation for likely adenomyosis.  Patient was initially seen in 2024 with reported heavy prolonged bleeding x2 weeks.  Of note, patient reports today that was her only episode of heavy bleeding. Endometrial biospy was attempted at that time but unsuccessful, she was started on Progesterone and ordered for a pelvic sonogram. Pelvic sono showed EM 25mm with cystic changes.  Patient was then admitted to the hospital for pyelonephritis, sepsis, and pulmonary edema 2/2 to fluid overload with noted severe anemia and received IV iron and blood transfusion. While inpatient, she had pelvic imaging as below:         - CT (): Fibroid uterus with a 1.0 cm lower uterine segment myoma. The endometrium is prominent and measures 1.5 cm. No adnexal mass.         - TVUS (): Uterus: 8.8 cm x 4.5 cm x 5.9 cm. Heterogenous echotexture. No discrete myometrial mass. Endometrium: 25 mm. Thickened with cystic changes. Gyn consult while inpatient recommended outpatient follow up for endometrial sampling.  Patient was then seen in clinic on  and EMBx completed at that time which evidenced late secretory phase endometrial tissue.  Follow up sonohystogram (7/3) unsuccessful. probably adenomyosis, EM lining thickened with questionable intracavity fibroid.  She was seen again in the office on 7/15, at that time stated the vaginal bleeding had stopped while on Aygestin.  Pelvic MRI performed () showed: thickening of the posterior junctional zone measuring up to 2.2 cm with multiple T2 hyperintense foci, suggestive of adenomyosis.  She was once again seen for follow up on , without further vaginal bleeding on Aygestin.  However, given findings suggestive of adenomyosis, she was referred to this clinic for surgical consultation for definitive management of heavy menses, likely adenomyosis.    Today, patient reports that she has been taking 10mg Aygestin continuously without abdominal pain or bleeding.  Feels well on the current regimen.  She states that she began having diarrhea several times a day this week and was started on Metronidazole yesterday by her PCP.  Reports her diarrhea is now improving.  Endorses weakness, which she states is chronic and has been present since prior to beginning Aygestin.  Otherwise denies chest pain, shortness of breath, dysuria, abdominal pain.     OBHx pLTCSx3, TOP x2 w/D&C GYN Hx: As above PMHx: Constipation, pyelo c/b urosepsis and pulmonary edema from fluid overload during admission s/p Lasix PSHx: c/s x3, D&Cx2 Meds: Aygestin 10mg QD, iron, Miralax, Senna, Metronidazole All:NKDA Sochx: Denies T/E/D Fhx: father/brothers with throat cancer, denies family history of gyn cancer

## 2024-08-07 DIAGNOSIS — N80.03 ADENOMYOSIS OF THE UTERUS: ICD-10-CM

## 2024-08-07 DIAGNOSIS — N93.9 ABNORMAL UTERINE AND VAGINAL BLEEDING, UNSPECIFIED: ICD-10-CM

## 2024-09-12 ENCOUNTER — APPOINTMENT (OUTPATIENT)
Dept: OBGYN | Facility: CLINIC | Age: 49
End: 2024-09-12

## 2024-09-12 ENCOUNTER — OUTPATIENT (OUTPATIENT)
Dept: OUTPATIENT SERVICES | Facility: HOSPITAL | Age: 49
LOS: 1 days | End: 2024-09-12
Payer: MEDICAID

## 2024-09-12 ENCOUNTER — LABORATORY RESULT (OUTPATIENT)
Age: 49
End: 2024-09-12

## 2024-09-12 DIAGNOSIS — N76.0 ACUTE VAGINITIS: ICD-10-CM

## 2024-09-12 DIAGNOSIS — Z98.890 OTHER SPECIFIED POSTPROCEDURAL STATES: Chronic | ICD-10-CM

## 2024-09-12 DIAGNOSIS — N93.9 ABNORMAL UTERINE AND VAGINAL BLEEDING, UNSPECIFIED: ICD-10-CM

## 2024-09-12 LAB
HCT VFR BLD CALC: 41.4 % — SIGNIFICANT CHANGE UP (ref 34.5–45)
HGB BLD-MCNC: 13.4 G/DL — SIGNIFICANT CHANGE UP (ref 11.5–15.5)
MCHC RBC-ENTMCNC: 24.6 PG — LOW (ref 27–34)
MCHC RBC-ENTMCNC: 32.4 GM/DL — SIGNIFICANT CHANGE UP (ref 32–36)
MCV RBC AUTO: 76 FL — LOW (ref 80–100)
PLATELET # BLD AUTO: 296 K/UL — SIGNIFICANT CHANGE UP (ref 150–400)
RBC # BLD: 5.45 M/UL — HIGH (ref 3.8–5.2)
RBC # FLD: 14.1 % — SIGNIFICANT CHANGE UP (ref 10.3–14.5)
WBC # BLD: 9.67 K/UL — SIGNIFICANT CHANGE UP (ref 3.8–10.5)
WBC # FLD AUTO: 9.67 K/UL — SIGNIFICANT CHANGE UP (ref 3.8–10.5)

## 2024-09-12 PROCEDURE — G0463: CPT

## 2024-09-12 PROCEDURE — 85027 COMPLETE CBC AUTOMATED: CPT

## 2024-09-12 PROCEDURE — 36415 COLL VENOUS BLD VENIPUNCTURE: CPT

## 2024-09-12 PROCEDURE — 99213 OFFICE O/P EST LOW 20 MIN: CPT

## 2024-09-24 RX ORDER — NORGESTIMATE AND ETHINYL ESTRADIOL 0.25-0.035
0.25-35 KIT ORAL DAILY
Qty: 1 | Refills: 0 | Status: ACTIVE | COMMUNITY
Start: 2024-09-24 | End: 1900-01-01

## 2024-10-10 ENCOUNTER — APPOINTMENT (OUTPATIENT)
Dept: OBGYN | Facility: CLINIC | Age: 49
End: 2024-10-10
Payer: MEDICAID

## 2024-10-10 ENCOUNTER — OUTPATIENT (OUTPATIENT)
Dept: OUTPATIENT SERVICES | Facility: HOSPITAL | Age: 49
LOS: 1 days | End: 2024-10-10
Payer: MEDICAID

## 2024-10-10 DIAGNOSIS — N93.9 ABNORMAL UTERINE AND VAGINAL BLEEDING, UNSPECIFIED: ICD-10-CM

## 2024-10-10 DIAGNOSIS — Z98.890 OTHER SPECIFIED POSTPROCEDURAL STATES: Chronic | ICD-10-CM

## 2024-10-10 DIAGNOSIS — N76.0 ACUTE VAGINITIS: ICD-10-CM

## 2024-10-10 PROCEDURE — G0463: CPT

## 2024-10-10 PROCEDURE — 99214 OFFICE O/P EST MOD 30 MIN: CPT

## 2024-10-21 ENCOUNTER — APPOINTMENT (OUTPATIENT)
Dept: OBGYN | Facility: CLINIC | Age: 49
End: 2024-10-21

## 2024-12-05 ENCOUNTER — APPOINTMENT (OUTPATIENT)
Dept: OBGYN | Facility: CLINIC | Age: 49
End: 2024-12-05
Payer: MEDICAID

## 2024-12-05 ENCOUNTER — OUTPATIENT (OUTPATIENT)
Dept: OUTPATIENT SERVICES | Facility: HOSPITAL | Age: 49
LOS: 1 days | End: 2024-12-05
Payer: MEDICAID

## 2024-12-05 VITALS — BODY MASS INDEX: 22.47 KG/M2 | WEIGHT: 135 LBS | SYSTOLIC BLOOD PRESSURE: 112 MMHG | DIASTOLIC BLOOD PRESSURE: 72 MMHG

## 2024-12-05 DIAGNOSIS — N80.03 ADENOMYOSIS OF THE UTERUS: ICD-10-CM

## 2024-12-05 DIAGNOSIS — Z98.890 OTHER SPECIFIED POSTPROCEDURAL STATES: Chronic | ICD-10-CM

## 2024-12-05 PROCEDURE — G0463: CPT

## 2024-12-05 PROCEDURE — 99213 OFFICE O/P EST LOW 20 MIN: CPT | Mod: GC

## 2024-12-05 RX ORDER — LEUPROLIDE ACETATE 11.25 MG
11.25 KIT INTRAMUSCULAR
Qty: 1 | Refills: 3 | Status: ACTIVE | COMMUNITY
Start: 2024-12-05 | End: 1900-01-01

## 2024-12-06 DIAGNOSIS — N76.0 ACUTE VAGINITIS: ICD-10-CM

## 2024-12-10 ENCOUNTER — RESULT CHARGE (OUTPATIENT)
Age: 49
End: 2024-12-10

## 2024-12-10 ENCOUNTER — OUTPATIENT (OUTPATIENT)
Dept: OUTPATIENT SERVICES | Facility: HOSPITAL | Age: 49
LOS: 1 days | End: 2024-12-10
Payer: MEDICAID

## 2024-12-10 ENCOUNTER — APPOINTMENT (OUTPATIENT)
Dept: OBGYN | Facility: CLINIC | Age: 49
End: 2024-12-10

## 2024-12-10 DIAGNOSIS — Z98.890 OTHER SPECIFIED POSTPROCEDURAL STATES: Chronic | ICD-10-CM

## 2024-12-10 DIAGNOSIS — N93.9 ABNORMAL UTERINE AND VAGINAL BLEEDING, UNSPECIFIED: ICD-10-CM

## 2024-12-10 DIAGNOSIS — N80.03 ADENOMYOSIS OF THE UTERUS: ICD-10-CM

## 2024-12-10 LAB
HCG UR QL: NEGATIVE
QUALITY CONTROL: YES

## 2024-12-10 PROCEDURE — 81025 URINE PREGNANCY TEST: CPT

## 2024-12-10 PROCEDURE — 96372 THER/PROPH/DIAG INJ SC/IM: CPT

## 2024-12-11 DIAGNOSIS — N76.0 ACUTE VAGINITIS: ICD-10-CM

## 2024-12-20 DIAGNOSIS — Z79.818 LONG TERM (CURRENT) USE OF OTHER AGENTS AFFECTING ESTROGEN RECEPTORS AND ESTROGEN LEVELS: ICD-10-CM

## 2024-12-20 DIAGNOSIS — N93.9 ABNORMAL UTERINE AND VAGINAL BLEEDING, UNSPECIFIED: ICD-10-CM

## 2025-02-03 NOTE — DISCHARGE NOTE PROVIDER - CARE PROVIDERS DIRECT ADDRESSES
Received request via: Patient    Was the patient seen in the last year in this department? Yes    Does the patient have an active prescription (recently filled or refills available) for medication(s) requested? No    Pharmacy Name: [id number]    Does the patient have longterm Plus and need 100-day supply? (This applies to ALL medications) Yes, quantity updated to 100 days  
,DirectAddress_Unknown

## 2025-03-10 ENCOUNTER — APPOINTMENT (OUTPATIENT)
Dept: OBGYN | Facility: CLINIC | Age: 50
End: 2025-03-10

## 2025-03-12 ENCOUNTER — APPOINTMENT (OUTPATIENT)
Dept: OBGYN | Facility: CLINIC | Age: 50
End: 2025-03-12

## 2025-03-17 ENCOUNTER — APPOINTMENT (OUTPATIENT)
Dept: OBGYN | Facility: CLINIC | Age: 50
End: 2025-03-17

## 2025-03-17 ENCOUNTER — OUTPATIENT (OUTPATIENT)
Dept: OUTPATIENT SERVICES | Facility: HOSPITAL | Age: 50
LOS: 1 days | End: 2025-03-17

## 2025-03-17 DIAGNOSIS — Z98.890 OTHER SPECIFIED POSTPROCEDURAL STATES: Chronic | ICD-10-CM

## 2025-03-17 DIAGNOSIS — N76.0 ACUTE VAGINITIS: ICD-10-CM

## 2025-03-17 LAB
HCG UR QL: NEGATIVE
QUALITY CONTROL: YES

## 2025-03-17 PROCEDURE — 81025 URINE PREGNANCY TEST: CPT

## 2025-03-17 PROCEDURE — 96372 THER/PROPH/DIAG INJ SC/IM: CPT

## 2025-03-17 RX ORDER — LEUPROLIDE ACETATE 11.25 MG
11.25 KIT INTRAMUSCULAR
Qty: 1 | Refills: 0 | Status: COMPLETED | OUTPATIENT
Start: 2025-03-17

## 2025-03-17 RX ADMIN — LEUPROLIDE ACETATE 0 MG: KIT at 00:00

## 2025-03-26 DIAGNOSIS — N92.0 EXCESSIVE AND FREQUENT MENSTRUATION WITH REGULAR CYCLE: ICD-10-CM

## 2025-03-26 DIAGNOSIS — N93.9 ABNORMAL UTERINE AND VAGINAL BLEEDING, UNSPECIFIED: ICD-10-CM

## 2025-06-18 ENCOUNTER — APPOINTMENT (OUTPATIENT)
Dept: OBGYN | Facility: CLINIC | Age: 50
End: 2025-06-18

## 2025-06-18 NOTE — ED PROVIDER NOTE - NSICDXPASTMEDICALHX_GEN_ALL_CORE_FT
I saw  Dickson yesterday and am aware. I request [facility] cardiology appointment PAST MEDICAL HISTORY:  GERD (gastroesophageal reflux disease)     History of chronic constipation     No pertinent past medical history

## 2025-06-19 ENCOUNTER — OUTPATIENT (OUTPATIENT)
Dept: OUTPATIENT SERVICES | Facility: HOSPITAL | Age: 50
LOS: 1 days | End: 2025-06-19
Payer: MEDICAID

## 2025-06-19 ENCOUNTER — APPOINTMENT (OUTPATIENT)
Dept: OBGYN | Facility: CLINIC | Age: 50
End: 2025-06-19

## 2025-06-19 DIAGNOSIS — Z98.890 OTHER SPECIFIED POSTPROCEDURAL STATES: Chronic | ICD-10-CM

## 2025-06-19 DIAGNOSIS — N92.0 EXCESSIVE AND FREQUENT MENSTRUATION WITH REGULAR CYCLE: ICD-10-CM

## 2025-06-19 DIAGNOSIS — N93.9 ABNORMAL UTERINE AND VAGINAL BLEEDING, UNSPECIFIED: ICD-10-CM

## 2025-06-19 PROCEDURE — 96372 THER/PROPH/DIAG INJ SC/IM: CPT

## 2025-06-20 DIAGNOSIS — N76.0 ACUTE VAGINITIS: ICD-10-CM
